# Patient Record
Sex: FEMALE | Race: WHITE | Employment: OTHER | URBAN - METROPOLITAN AREA
[De-identification: names, ages, dates, MRNs, and addresses within clinical notes are randomized per-mention and may not be internally consistent; named-entity substitution may affect disease eponyms.]

---

## 2017-01-13 PROBLEM — F33.1 MODERATE EPISODE OF RECURRENT MAJOR DEPRESSIVE DISORDER (HCC): Status: ACTIVE | Noted: 2017-01-13

## 2017-01-17 ENCOUNTER — PATIENT OUTREACH (OUTPATIENT)
Dept: CASE MANAGEMENT | Age: 72
End: 2017-01-17

## 2017-01-17 NOTE — PROGRESS NOTES
Attempted to reach pt for f/u CCM services, left vm message with RNCM contact information. Will continue attempts to reach pt. This note will not be viewable in [address]

## 2017-01-18 NOTE — PROGRESS NOTES
This note will not be viewable in [address] Care Needs:  Spoke with pt who returned RNCM call. Pt states she saw Dr. Abeba Gay on 1/13/17 with depression, pt reports she is feeling better since having Elavil added. She reports that ibuprofen is helping to control her pain, but that it is impacting her Coumadin levels which she has been compliant with having checked. She states PCP doesnt want her taking narcotics, and pt agrees. She reports she sees [facility] at [facility] who is working with her on diarrhea, it is resolving. Pt is also going to have evaluation of left shoulder by OT at [facility] on Friday of this week. Pt is concerned she has injured the top of her Rt foot and has appt with podiatrist Monday 1/23/17. Next Steps: Scheduled call to f/u in 4wks with OT/podiatry referrals. Pt has my name and number for questions or concerns.

## 2017-02-14 ENCOUNTER — PATIENT OUTREACH (OUTPATIENT)
Dept: CASE MANAGEMENT | Age: 72
End: 2017-02-14

## 2017-02-23 ENCOUNTER — PATIENT OUTREACH (OUTPATIENT)
Dept: CASE MANAGEMENT | Age: 72
End: 2017-02-23

## 2017-03-02 ENCOUNTER — PATIENT OUTREACH (OUTPATIENT)
Dept: CASE MANAGEMENT | Age: 72
End: 2017-03-02

## 2017-03-02 NOTE — PROGRESS NOTES
This note will not be viewable in [address] RNCM attempted to reach pt, left 3rd  message with RNCM contact information. Will continue attempts to reach pt.

## 2017-03-15 ENCOUNTER — PATIENT OUTREACH (OUTPATIENT)
Dept: CASE MANAGEMENT | Age: 72
End: 2017-03-15

## 2017-03-15 NOTE — PROGRESS NOTES
This note will not be viewable in [address] Care Needs: RNCM spoke with pt regarding CCM services. Pt reports she is taking too much ibuprofen up to 600mg every 6hrs, stating she is bruised all over. She has her PT/INR checked tomorrow and says she knows it is going to be elevated. Pt reports she does ok with her walker mobility wise and denies recent falls. She states her pain consumes her so cant walk long distances. Pt saw PCP today, she is happy with changes in medications that were made to increase Trazodone and Effexor. Pt is looking forward to pain mgmt. consult on 3/17/17 with [facility]. She believes there may be more options for treating pain than just giving her narcotics. Pt stated she was on the community bus and needed to hang up. Pt agreed to [facility] f/u in 1wk to discuss her pain mgmt. consult and any other needs. Next Steps: RNCM scheduled call to f/u in 1wk regarding pain mgmt. Pt has RNCM contact information for questions or concerns.

## 2017-03-21 PROBLEM — Z79.899 ENCOUNTER FOR MEDICATION MANAGEMENT: Status: ACTIVE | Noted: 2017-03-21

## 2017-03-21 PROBLEM — R29.3 POSTURAL IMBALANCE: Status: ACTIVE | Noted: 2017-03-21

## 2017-03-21 PROBLEM — G62.9 POLYNEUROPATHY: Status: ACTIVE | Noted: 2017-03-21

## 2017-03-22 ENCOUNTER — PATIENT OUTREACH (OUTPATIENT)
Dept: CASE MANAGEMENT | Age: 72
End: 2017-03-22

## 2017-03-22 NOTE — PROGRESS NOTES
This note will not be viewable in [address] RNCM received call back from pt stating she is doing much better. She received 2 injections yesterday at the pain mgmt clinic. She continues to have pain and states she doesn't think the injections will help. She is continuing to receive OT services for her spasticity. She is also seeing neurologist for possible seizures during previous hospital stay. Pt does not believe she had seizure, and will be having EEG. RNCM discussed upcoming appts. Pt reports she is following up with pain mgmt in 2wks. She reports that she continues to rely heavily on ibuprofen even though it impacts her INR, she is having her INR checked again tomorrow. She is appreciative of Dr. Kayce Trevino and this RNCM as resource. Next Steps: RNCM scheduled call back in 2wks to f/u pain mgmt and INR. Pt has RNCM contact information for questions and concerns.

## 2017-03-22 NOTE — PROGRESS NOTES
This note will not be viewable in [address] RNCM attempted to reach pt for f/u CCM services, left vm with RNCM contact information requesting call back. Will continue attempts to reach pt.

## 2017-03-23 ENCOUNTER — HOSPITAL ENCOUNTER (OUTPATIENT)
Dept: LAB | Age: 72
Discharge: HOME | End: 2017-03-23
Attending: INTERNAL MEDICINE
Payer: MEDICARE

## 2017-03-23 DIAGNOSIS — R06.02 SHORTNESS OF BREATH: ICD-10-CM

## 2017-03-23 LAB
ALBUMIN SERPL BCP-MCNC: 3.7 G/DL (ref 3.2–4.6)
ALBUMIN/GLOB SERPL: 1 {RATIO}
ALP SERPL-CCNC: 82 U/L (ref 50–136)
ALT SERPL-CCNC: 23 U/L (ref 12–65)
ANION GAP BLD CALC-SCNC: 7 MMOL/L
AST SERPL W P-5'-P-CCNC: 22 U/L (ref 15–37)
BASOPHILS # BLD AUTO: 0 K/UL (ref 0–0.2)
BASOPHILS # BLD: 0 % (ref 0–2)
BILIRUB SERPL-MCNC: 0.2 MG/DL (ref 0.2–1.1)
BNP SERPL-MCNC: 148 PG/ML
BUN SERPL-MCNC: 30 MG/DL (ref 8–23)
CALCIUM SERPL-MCNC: 9.5 MG/DL (ref 8.3–10.4)
CHLORIDE SERPL-SCNC: 104 MMOL/L (ref 98–107)
CO2 SERPL-SCNC: 31 MMOL/L (ref 23–32)
CREAT SERPL-MCNC: 1 MG/DL (ref 0.6–1)
DIFFERENTIAL METHOD BLD: ABNORMAL
EOSINOPHIL # BLD: 0 K/UL (ref 0–0.8)
EOSINOPHIL NFR BLD: 0 % (ref 0.5–7.8)
ERYTHROCYTE [DISTWIDTH] IN BLOOD BY AUTOMATED COUNT: 13.7 % (ref 11.9–14.6)
GLOBULIN SER CALC-MCNC: 3.8 G/DL
GLUCOSE SERPL-MCNC: 93 MG/DL (ref 65–100)
HCT VFR BLD AUTO: 40.1 % (ref 35.8–46.3)
HGB BLD-MCNC: 12.7 G/DL (ref 11.7–15.4)
LYMPHOCYTES # BLD AUTO: 15 % (ref 13–44)
LYMPHOCYTES # BLD: 1.3 K/UL (ref 0.5–4.6)
MAGNESIUM SERPL-MCNC: 2.1 MG/DL (ref 1.8–2.4)
MCH RBC QN AUTO: 30.5 PG (ref 26.1–32.9)
MCHC RBC AUTO-ENTMCNC: 31.7 G/DL (ref 31.4–35)
MCV RBC AUTO: 96.2 FL (ref 79.6–97.8)
MONOCYTES # BLD: 1.1 K/UL (ref 0.1–1.3)
MONOCYTES NFR BLD AUTO: 13 % (ref 4–12)
NEUTS SEG # BLD: 6.4 K/UL (ref 1.7–8.2)
NEUTS SEG NFR BLD AUTO: 72 % (ref 43–78)
PLATELET # BLD AUTO: 258 K/UL (ref 150–450)
PMV BLD AUTO: 9.2 FL (ref 10.8–14.1)
POTASSIUM SERPL-SCNC: 3.9 MMOL/L (ref 3.5–5.1)
PROT SERPL-MCNC: 7.5 G/DL (ref 6.3–8.2)
RBC # BLD AUTO: 4.17 M/UL (ref 4.05–5.25)
SODIUM SERPL-SCNC: 142 MMOL/L (ref 136–145)
TSH SERPL DL<=0.005 MIU/L-ACNC: 1.43 UIU/ML (ref 0.36–3.74)
WBC # BLD AUTO: 8.9 K/UL (ref 4.3–11.1)

## 2017-03-23 PROCEDURE — 83880 ASSAY OF NATRIURETIC PEPTIDE: CPT | Performed by: INTERNAL MEDICINE

## 2017-03-23 PROCEDURE — 80053 COMPREHEN METABOLIC PANEL: CPT | Performed by: INTERNAL MEDICINE

## 2017-03-23 PROCEDURE — 36415 COLL VENOUS BLD VENIPUNCTURE: CPT | Performed by: INTERNAL MEDICINE

## 2017-03-23 PROCEDURE — 85025 COMPLETE CBC W/AUTO DIFF WBC: CPT | Performed by: INTERNAL MEDICINE

## 2017-03-23 PROCEDURE — 83735 ASSAY OF MAGNESIUM: CPT | Performed by: INTERNAL MEDICINE

## 2017-03-23 PROCEDURE — 84443 ASSAY THYROID STIM HORMONE: CPT | Performed by: INTERNAL MEDICINE

## 2017-03-24 NOTE — PROGRESS NOTES
Pt.notified of DR. Aguilera's response to labs and she voiced understanding. Lab for bmp/bnp/[facility] [place] [facility]. Prescription for Lasix escribed to [facility].

## 2017-03-24 NOTE — PROGRESS NOTES
Please call patient, have her started lasix 20 per day, check BMP/BNP/Mg in 10 days to  2 weeks. We will see if this helps the SOB and edema.   Thanks, Bita Carpenter

## 2017-04-05 ENCOUNTER — PATIENT OUTREACH (OUTPATIENT)
Dept: CASE MANAGEMENT | Age: 72
End: 2017-04-05

## 2017-04-05 NOTE — PROGRESS NOTES
This note will not be viewable in [address] Care Needs: RNCM spoke with pt regarding [facility] services. Pt reports her f/u EEG was abnormal showing generalized seizure disorder. Pt understands her history of falls may be attributed to the seizures. She has been started on Depakote and reports she is sleepy from it. RNCM discussed falls precautions, pt is aware. She understands she will need to have her blood monitored being on depakote. She will see neurologist 7/2017 when they will do EMG with nerve conduction study. Pt reports her Pain mgmt. physician plans for MRI of back and f/u in 10days. This has not yet been scheduled. Pt diagnosed with UTI and bronchitis, and is on Augmentin and inhaler now. She was given sample of albuterol. She states she will not be able to afford however due to cost of $50.  RNCM researched with [facility] and found that to be cheapest for pt. RNCM will requests additional samples for pt. She realizes she is still taking too much ibuprofen, but says she has INR drawn tomorrow. She has cut her amoxicillin back to once/day due to diarrhea. Pt reports she gets SOB with exertion. She is taking therapy at the apts and her therapist says she is progressing. Her mood and spirits are certainly lifted, as pt verbalizes herself. Next Steps: RNCM scheduled call back in 2wks to f/u pain mgmt., MRI, and INR. Pt has RNCM contact information for questions and concerns.

## 2017-04-06 ENCOUNTER — HOSPITAL ENCOUNTER (OUTPATIENT)
Dept: LAB | Age: 72
Discharge: HOME | End: 2017-04-06
Attending: INTERNAL MEDICINE
Payer: MEDICARE

## 2017-04-06 DIAGNOSIS — I10 ESSENTIAL HYPERTENSION: ICD-10-CM

## 2017-04-06 DIAGNOSIS — I74.9 EMBOLISM AND THROMBOSIS OF UNSPECIFIED ARTERY (HCC): ICD-10-CM

## 2017-04-06 DIAGNOSIS — Z95.2 S/P AVR (AORTIC VALVE REPLACEMENT): Chronic | ICD-10-CM

## 2017-04-06 DIAGNOSIS — Q23.0 AORTIC VALVE STENOSIS, CONGENITAL: ICD-10-CM

## 2017-04-06 LAB
ANION GAP BLD CALC-SCNC: 8 MMOL/L
BNP SERPL-MCNC: 46 PG/ML
BUN SERPL-MCNC: 33 MG/DL (ref 8–23)
CALCIUM SERPL-MCNC: 9.7 MG/DL (ref 8.3–10.4)
CHLORIDE SERPL-SCNC: 101 MMOL/L (ref 98–107)
CO2 SERPL-SCNC: 29 MMOL/L (ref 23–32)
CREAT SERPL-MCNC: 1.1 MG/DL (ref 0.6–1)
GLUCOSE SERPL-MCNC: 98 MG/DL (ref 65–100)
MAGNESIUM SERPL-MCNC: 2 MG/DL (ref 1.8–2.4)
POTASSIUM SERPL-SCNC: 4.7 MMOL/L (ref 3.5–5.1)
SODIUM SERPL-SCNC: 138 MMOL/L (ref 136–145)

## 2017-04-06 PROCEDURE — 83735 ASSAY OF MAGNESIUM: CPT | Performed by: INTERNAL MEDICINE

## 2017-04-06 PROCEDURE — 36415 COLL VENOUS BLD VENIPUNCTURE: CPT | Performed by: INTERNAL MEDICINE

## 2017-04-06 PROCEDURE — 83880 ASSAY OF NATRIURETIC PEPTIDE: CPT | Performed by: INTERNAL MEDICINE

## 2017-04-06 PROCEDURE — 80048 BASIC METABOLIC PNL TOTAL CA: CPT | Performed by: INTERNAL MEDICINE

## 2017-04-06 NOTE — PROGRESS NOTES
Labs look better, how is she feeling on lasix? How much lasix and K is she taking now?   Thanks, Nuzhat Donald

## 2017-04-06 NOTE — PROGRESS NOTES
See telephone encounter lab results from 4/6/17. Pt.was notified of results. Lasix decreased to every other day. Bethany Fischer

## 2017-04-14 PROBLEM — F33.41 RECURRENT MAJOR DEPRESSIVE DISORDER, IN PARTIAL REMISSION (HCC): Status: ACTIVE | Noted: 2017-04-14

## 2017-04-14 PROBLEM — R06.09 DYSPNEA ON EXERTION: Status: ACTIVE | Noted: 2017-04-14

## 2017-04-21 ENCOUNTER — PATIENT OUTREACH (OUTPATIENT)
Dept: CASE MANAGEMENT | Age: 72
End: 2017-04-21

## 2017-04-21 NOTE — PROGRESS NOTES
This note will not be viewable in [address] RNCM attempted to reach pt for f/u of pain mgmt, left vm messaage with RNCM contac information. Will continue attempts to reach pt.

## 2017-04-28 ENCOUNTER — PATIENT OUTREACH (OUTPATIENT)
Dept: CASE MANAGEMENT | Age: 72
End: 2017-04-28

## 2017-04-28 NOTE — PROGRESS NOTES
cThis note will not be viewable in [address] Attempted to reach pt for f/u CCM services, no answer. RNCM left vm message with contact information. Will ontinue attempts to reach pt for f/u.

## 2017-04-28 NOTE — PROGRESS NOTES
This note will not be viewable in [address] RNCM received call back from pt stating she is at [facility] after staying 1wk at [facility]. Pt states she got confused when her neurologist changed her anti-seizure medication, and was found outside by the staff at [facility]. Pt also reports she had taken too much ibuprofen and had severe GI rectal bleed while in hospital.  Pt doesnt have any recollection except for being in ER at [facility] and receiving blood. Pt states she wants to end it all, but she hopes to go back to [facility] after her rehab stay at [facility]. She is aware her PCP recommends [facility], however pt states she doesnt want to go to [facility]. RNCM provided verbal support and prayer for pt while on call. RNCM then called [facility] and spoke with staff Sofía Ramos to report pts comment regarding wanting to end it all. RNCM provided pts PCP contact information to Sofía Ramos. Sofía Ramos took this RNCMs contact information as well.

## 2017-05-05 ENCOUNTER — PATIENT OUTREACH (OUTPATIENT)
Dept: CASE MANAGEMENT | Age: 72
End: 2017-05-05

## 2017-05-05 NOTE — PROGRESS NOTES
This note will not be viewable in [address] Per report from [facility], pt remains at [facility], admitted on 4/25/17 and is medically stable. No dc plan at this time. Will follow for RINKU.

## 2017-05-17 ENCOUNTER — PATIENT OUTREACH (OUTPATIENT)
Dept: CASE MANAGEMENT | Age: 72
End: 2017-05-17

## 2017-05-17 NOTE — PROGRESS NOTES
This note will not be viewable in [address] Care Needs:  RNCM spoke with pt regarding her transition from [facility] to home. Pt states she came home to [address] [place] [address] Ave. living on Monday 5/15/17. Pt has f/u scheduled with NP at PCP office on 5/23/17. Pt has [facility] coming to see her, first visit was yesterday 5/16/17 and [facility] RN will return for INR check on 5/19/17. Pt reports she will also be evaluated by [facility] at [employer]. RNCM attempted to review Coumadin dosing with pt. Pt stated she took 2mg Coumadin last night but is unsure how much she is take tonight. Pt requested to end call so she could call [facility] RN to confirm Coumadin dosing. RNCM agreed to end call. RNCM will f/u with pt.

## 2017-05-18 ENCOUNTER — PATIENT OUTREACH (OUTPATIENT)
Dept: CASE MANAGEMENT | Age: 72
End: 2017-05-18

## 2017-05-18 NOTE — PROGRESS NOTES
This note will not be viewable in [address] RNCM attempted to return call to pt, no answer, RNCM left vm message for pt and will continue attempts at outreach.

## 2017-05-26 ENCOUNTER — PATIENT OUTREACH (OUTPATIENT)
Dept: CASE MANAGEMENT | Age: 72
End: 2017-05-26

## 2017-05-26 NOTE — PROGRESS NOTES
This note will not be viewable in [address] RNCM attempted to reach pt for f/u CCM services. RNCM left vm message with contact information.

## 2017-05-30 ENCOUNTER — PATIENT OUTREACH (OUTPATIENT)
Dept: CASE MANAGEMENT | Age: 72
End: 2017-05-30

## 2017-05-30 NOTE — PROGRESS NOTES
This note will not be viewable in [address] RNCM attempted to reach pt regarding f/u CCM services, no answer. RNCM left vm with contact information. RNCM spoke with rep from [facility] [place] and learned pt is still being followed by this services. RNCM will continue attempts to reach pt.

## 2017-06-05 ENCOUNTER — PATIENT OUTREACH (OUTPATIENT)
Dept: CASE MANAGEMENT | Age: 72
End: 2017-06-05

## 2017-06-05 NOTE — PROGRESS NOTES
This note will not be viewable in [address] Care Needs: RNCM received call from pt stating she thinks she had a seizure during the night. She got up to get some milk and found herself on the floor in the kitchen at 1am. Pt is not aware of how long she was on the floor. She was able to get herself up, thus she didn't seek anyone's assistance via her personal emergency response button around her neck or otherwise. Pt has called Dr. Winter her neurologist and left a message. She has appt with him in 6wks. Pt requests this RNCM also contact Dr. Ronaldo Del Rio office. Pt reports she is missing 2 teeth out of her upper dentures. Pt reports she is going Thursday to have them replaced. RNCM called neurologist as py requested and left message with DARIANA King who is out of the office this afternoon. RNCM also called pt back leaving  notifying of above, and encouraging pt to seek emergency assistance if this occurs again.

## 2017-06-06 ENCOUNTER — PATIENT OUTREACH (OUTPATIENT)
Dept: CASE MANAGEMENT | Age: 72
End: 2017-06-06

## 2017-06-06 NOTE — PROGRESS NOTES
This note will not be viewable in [address] Care Needs: RNCM spoke with  Pt regarding CCM services. Per pt she has not received call back from neurologist office from vms left by pt and RNCM about pt seizure. Pt denies any further seizure activity since previous conversation. Pt reports her INR 1.8 today by [address] RN. They are going to communicate results and next steps for coumadin dosage to pt. Per pt she has a pain mgmt appt on Friday 6/9, PCP appt 6/20. Next Steps: RNCM left 2nd  for Christine WESTBROOK at Dr. Major Floor office at request of pt.

## 2017-06-15 ENCOUNTER — PATIENT OUTREACH (OUTPATIENT)
Dept: CASE MANAGEMENT | Age: 72
End: 2017-06-15

## 2017-06-15 NOTE — Clinical Note
Dr. Debra Santoyo, Please see attached progress note regarding Mao Wan who is also being followed by [address] [place] [address] Road. Thank you, Yady Peterson, RN, BSN, [email or web address] c: [phone] / Lorri Rivera [address] [place] [address], [address] [place], [address] [facility] [email or web address]. [facility]

## 2017-06-15 NOTE — PROGRESS NOTES
This note will not be viewable in [address] Care Needs: Pt reports she started having diarrhea today. Pt denies having fever but is having stomach cramping. She has not taken anything for symptoms but has lomotil and states she will take that. RNCM instructed pt to contact PCP office if continues til tomorrow. Pt stated \"I don't want to see her right now\". Pt seemed cofufused as to the day. RNCM requested permission to [facility] of pt symptoms. Pt agreed and stated she needed to lay back down. RNCM called Ariadna Sprague at [phone] and requested Jumana return call. RNCM also copied PCP office on this note for notification.

## 2017-06-22 ENCOUNTER — PATIENT OUTREACH (OUTPATIENT)
Dept: CASE MANAGEMENT | Age: 72
End: 2017-06-22

## 2017-06-22 NOTE — PROGRESS NOTES
This note will not be viewable in [address] RNCM attempted f/u of CCM services, left vm message with RNCM contact information. Will continue attempts to reach pt.

## 2017-06-26 ENCOUNTER — APPOINTMENT (OUTPATIENT)
Dept: CT IMAGING | Age: 72
DRG: 071 | End: 2017-06-26
Attending: EMERGENCY MEDICINE
Payer: MEDICARE

## 2017-06-26 ENCOUNTER — HOSPITAL ENCOUNTER (INPATIENT)
Age: 72
LOS: 6 days | Discharge: NURSING, REHAB OR LONG-TERM CARE | DRG: 071 | End: 2017-07-03
Attending: EMERGENCY MEDICINE | Admitting: HOSPITALIST
Payer: MEDICARE

## 2017-06-26 DIAGNOSIS — R41.82 ALTERED MENTAL STATUS, UNSPECIFIED ALTERED MENTAL STATUS TYPE: Primary | ICD-10-CM

## 2017-06-26 LAB
ALBUMIN SERPL BCP-MCNC: 3.1 G/DL (ref 3.2–4.6)
ALBUMIN/GLOB SERPL: 0.9 {RATIO} (ref 1.2–3.5)
ALP SERPL-CCNC: 52 U/L (ref 50–136)
ALT SERPL-CCNC: 50 U/L (ref 12–65)
AMPHET UR QL SCN: NEGATIVE
ANION GAP BLD CALC-SCNC: 8 MMOL/L (ref 7–16)
ARTERIAL PATENCY WRIST A: POSITIVE
AST SERPL W P-5'-P-CCNC: 40 U/L (ref 15–37)
BACTERIA URNS QL MICRO: NORMAL /HPF
BARBITURATES UR QL SCN: NEGATIVE
BASE EXCESS BLDA CALC-SCNC: 5.3 MMOL/L (ref 0–3)
BASOPHILS # BLD AUTO: 0 K/UL (ref 0–0.2)
BASOPHILS # BLD: 1 % (ref 0–2)
BDY SITE: ABNORMAL
BENZODIAZ UR QL: NEGATIVE
BILIRUB SERPL-MCNC: 0.3 MG/DL (ref 0.2–1.1)
BUN SERPL-MCNC: 16 MG/DL (ref 8–23)
CALCIUM SERPL-MCNC: 9 MG/DL (ref 8.3–10.4)
CANNABINOIDS UR QL SCN: NEGATIVE
CASTS URNS QL MICRO: 0 /LPF
CHLORIDE SERPL-SCNC: 104 MMOL/L (ref 98–107)
CO2 SERPL-SCNC: 32 MMOL/L (ref 21–32)
COCAINE UR QL SCN: NEGATIVE
COHGB MFR BLD: 0.8 % (ref 0.5–1.5)
CREAT SERPL-MCNC: 0.75 MG/DL (ref 0.6–1)
CRYSTALS URNS QL MICRO: 0 /LPF
DIFFERENTIAL METHOD BLD: ABNORMAL
DO-HGB BLD-MCNC: 8 % (ref 0–5)
EOSINOPHIL # BLD: 0.1 K/UL (ref 0–0.8)
EOSINOPHIL NFR BLD: 2 % (ref 0.5–7.8)
EPI CELLS #/AREA URNS HPF: NORMAL /HPF
ERYTHROCYTE [DISTWIDTH] IN BLOOD BY AUTOMATED COUNT: 13.4 % (ref 11.9–14.6)
FIO2 ON VENT: 21 %
GLOBULIN SER CALC-MCNC: 3.4 G/DL (ref 2.3–3.5)
GLUCOSE SERPL-MCNC: 74 MG/DL (ref 65–100)
HCO3 BLDA-SCNC: 29 MMOL/L (ref 22–26)
HCT VFR BLD AUTO: 41.3 % (ref 35.8–46.3)
HGB BLD-MCNC: 13.9 G/DL (ref 11.7–15.4)
HGB BLDMV-MCNC: 13.8 GM/DL (ref 11.7–15)
IMM GRANULOCYTES # BLD: 0 K/UL (ref 0–0.5)
IMM GRANULOCYTES NFR BLD AUTO: 0.2 % (ref 0–5)
LIPASE SERPL-CCNC: 79 U/L (ref 73–393)
LYMPHOCYTES # BLD AUTO: 28 % (ref 13–44)
LYMPHOCYTES # BLD: 1.6 K/UL (ref 0.5–4.6)
MCH RBC QN AUTO: 32 PG (ref 26.1–32.9)
MCHC RBC AUTO-ENTMCNC: 33.7 G/DL (ref 31.4–35)
MCV RBC AUTO: 95.2 FL (ref 79.6–97.8)
METHADONE UR QL: NEGATIVE
METHGB MFR BLD: 0.2 % (ref 0–1.5)
MONOCYTES # BLD: 0.5 K/UL (ref 0.1–1.3)
MONOCYTES NFR BLD AUTO: 9 % (ref 4–12)
MUCOUS THREADS URNS QL MICRO: 0 /LPF
NEUTS SEG # BLD: 3.4 K/UL (ref 1.7–8.2)
NEUTS SEG NFR BLD AUTO: 60 % (ref 43–78)
OPIATES UR QL: NEGATIVE
OTHER OBSERVATIONS,UCOM: NORMAL
OXYHGB MFR BLDA: 91.5 % (ref 94–97)
PCO2 BLDA: 40 MMHG (ref 35–45)
PCP UR QL: NEGATIVE
PH BLDA: 7.48 [PH] (ref 7.35–7.45)
PLATELET # BLD AUTO: 116 K/UL (ref 150–450)
PMV BLD AUTO: 9.4 FL (ref 10.8–14.1)
PO2 BLDA: 65 MMHG (ref 75–100)
POTASSIUM SERPL-SCNC: 3.4 MMOL/L (ref 3.5–5.1)
PROT SERPL-MCNC: 6.5 G/DL (ref 6.3–8.2)
RBC # BLD AUTO: 4.34 M/UL (ref 4.05–5.25)
RBC #/AREA URNS HPF: NORMAL /HPF
SAO2 % BLD: 92 % (ref 92–98.5)
SODIUM SERPL-SCNC: 144 MMOL/L (ref 136–145)
VENTILATION MODE VENT: ABNORMAL
WBC # BLD AUTO: 5.7 K/UL (ref 4.3–11.1)
WBC URNS QL MICRO: NORMAL /HPF

## 2017-06-26 PROCEDURE — 87641 MR-STAPH DNA AMP PROBE: CPT | Performed by: INTERNAL MEDICINE

## 2017-06-26 PROCEDURE — 80307 DRUG TEST PRSMV CHEM ANLYZR: CPT | Performed by: EMERGENCY MEDICINE

## 2017-06-26 PROCEDURE — 94760 N-INVAS EAR/PLS OXIMETRY 1: CPT

## 2017-06-26 PROCEDURE — 65270000029 HC RM PRIVATE

## 2017-06-26 PROCEDURE — 83690 ASSAY OF LIPASE: CPT | Performed by: EMERGENCY MEDICINE

## 2017-06-26 PROCEDURE — 70450 CT HEAD/BRAIN W/O DYE: CPT

## 2017-06-26 PROCEDURE — 85025 COMPLETE CBC W/AUTO DIFF WBC: CPT | Performed by: EMERGENCY MEDICINE

## 2017-06-26 PROCEDURE — 82803 BLOOD GASES ANY COMBINATION: CPT

## 2017-06-26 PROCEDURE — 96374 THER/PROPH/DIAG INJ IV PUSH: CPT | Performed by: EMERGENCY MEDICINE

## 2017-06-26 PROCEDURE — 99285 EMERGENCY DEPT VISIT HI MDM: CPT | Performed by: EMERGENCY MEDICINE

## 2017-06-26 PROCEDURE — 85610 PROTHROMBIN TIME: CPT | Performed by: EMERGENCY MEDICINE

## 2017-06-26 PROCEDURE — 81015 MICROSCOPIC EXAM OF URINE: CPT | Performed by: EMERGENCY MEDICINE

## 2017-06-26 PROCEDURE — 74011250636 HC RX REV CODE- 250/636: Performed by: EMERGENCY MEDICINE

## 2017-06-26 PROCEDURE — 74011000250 HC RX REV CODE- 250: Performed by: INTERNAL MEDICINE

## 2017-06-26 PROCEDURE — 36600 WITHDRAWAL OF ARTERIAL BLOOD: CPT

## 2017-06-26 PROCEDURE — 72125 CT NECK SPINE W/O DYE: CPT

## 2017-06-26 PROCEDURE — 74011000258 HC RX REV CODE- 258: Performed by: INTERNAL MEDICINE

## 2017-06-26 PROCEDURE — 74011000250 HC RX REV CODE- 250: Performed by: EMERGENCY MEDICINE

## 2017-06-26 PROCEDURE — 74011250636 HC RX REV CODE- 250/636: Performed by: INTERNAL MEDICINE

## 2017-06-26 PROCEDURE — 80053 COMPREHEN METABOLIC PANEL: CPT | Performed by: EMERGENCY MEDICINE

## 2017-06-26 PROCEDURE — 94640 AIRWAY INHALATION TREATMENT: CPT

## 2017-06-26 RX ORDER — CARVEDILOL 6.25 MG/1
6.25 TABLET ORAL 2 TIMES DAILY WITH MEALS
Status: DISCONTINUED | OUTPATIENT
Start: 2017-06-27 | End: 2017-06-29

## 2017-06-26 RX ORDER — SODIUM CHLORIDE 9 MG/ML
100 INJECTION, SOLUTION INTRAVENOUS CONTINUOUS
Status: DISCONTINUED | OUTPATIENT
Start: 2017-06-26 | End: 2017-06-29

## 2017-06-26 RX ORDER — BUDESONIDE 0.5 MG/2ML
500 INHALANT ORAL
Status: DISCONTINUED | OUTPATIENT
Start: 2017-06-26 | End: 2017-07-03 | Stop reason: HOSPADM

## 2017-06-26 RX ORDER — WARFARIN 1 MG/1
4 TABLET ORAL
Status: DISCONTINUED | OUTPATIENT
Start: 2017-06-26 | End: 2017-06-27 | Stop reason: SDUPTHER

## 2017-06-26 RX ORDER — LANOLIN ALCOHOL/MO/W.PET/CERES
1000 CREAM (GRAM) TOPICAL DAILY
Status: DISCONTINUED | OUTPATIENT
Start: 2017-06-27 | End: 2017-06-28

## 2017-06-26 RX ORDER — LANOLIN ALCOHOL/MO/W.PET/CERES
1 CREAM (GRAM) TOPICAL
Status: DISCONTINUED | OUTPATIENT
Start: 2017-06-27 | End: 2017-07-03 | Stop reason: HOSPADM

## 2017-06-26 RX ORDER — LORAZEPAM 2 MG/ML
1 INJECTION INTRAMUSCULAR
Status: COMPLETED | OUTPATIENT
Start: 2017-06-26 | End: 2017-06-26

## 2017-06-26 RX ORDER — PREDNISONE 5 MG/1
5 TABLET ORAL
Status: DISCONTINUED | OUTPATIENT
Start: 2017-06-27 | End: 2017-07-03 | Stop reason: HOSPADM

## 2017-06-26 RX ORDER — ACETAMINOPHEN 500 MG
500 TABLET ORAL
Status: DISCONTINUED | OUTPATIENT
Start: 2017-06-26 | End: 2017-07-03 | Stop reason: HOSPADM

## 2017-06-26 RX ORDER — ALBUTEROL SULFATE 2.5 MG/.5ML
2.5 SOLUTION RESPIRATORY (INHALATION)
Status: DISCONTINUED | OUTPATIENT
Start: 2017-06-26 | End: 2017-06-28

## 2017-06-26 RX ORDER — SODIUM CHLORIDE 0.9 % (FLUSH) 0.9 %
5-10 SYRINGE (ML) INJECTION EVERY 8 HOURS
Status: DISCONTINUED | OUTPATIENT
Start: 2017-06-26 | End: 2017-07-03 | Stop reason: HOSPADM

## 2017-06-26 RX ORDER — SODIUM CHLORIDE 0.9 % (FLUSH) 0.9 %
5-10 SYRINGE (ML) INJECTION AS NEEDED
Status: DISCONTINUED | OUTPATIENT
Start: 2017-06-26 | End: 2017-06-28

## 2017-06-26 RX ORDER — FOLIC ACID 1 MG/1
1 TABLET ORAL DAILY
Status: DISCONTINUED | OUTPATIENT
Start: 2017-06-27 | End: 2017-07-03 | Stop reason: HOSPADM

## 2017-06-26 RX ORDER — LEVOTHYROXINE SODIUM 100 UG/1
100 TABLET ORAL
Status: DISCONTINUED | OUTPATIENT
Start: 2017-06-27 | End: 2017-06-28

## 2017-06-26 RX ADMIN — BUDESONIDE 500 MCG: 0.5 SUSPENSION RESPIRATORY (INHALATION) at 21:25

## 2017-06-26 RX ADMIN — SODIUM CHLORIDE 100 ML/HR: 900 INJECTION, SOLUTION INTRAVENOUS at 20:56

## 2017-06-26 RX ADMIN — LEVETIRACETAM 500 MG: 100 INJECTION, SOLUTION INTRAVENOUS at 21:56

## 2017-06-26 RX ADMIN — Medication 5 ML: at 21:59

## 2017-06-26 RX ADMIN — ALBUTEROL SULFATE 2.5 MG: 2.5 SOLUTION RESPIRATORY (INHALATION) at 21:25

## 2017-06-26 RX ADMIN — LORAZEPAM 1 MG: 2 INJECTION, SOLUTION INTRAMUSCULAR; INTRAVENOUS at 16:28

## 2017-06-26 RX ADMIN — Medication 5 ML: at 15:31

## 2017-06-26 NOTE — ED NOTES
Patient began trying to climb out of bed yelling \"I got to pee! \"     Patient assisted to bed pan with no urine output. Patient continues to state \"I got to pee. \" and trying to climb out of bed. Straight cath performed. 300mL output. Patient assisted into position of comfort.

## 2017-06-26 NOTE — H&P
HOSPITALIST H&P/CONSULT  NAME:  José Coon   Age:  80 y.o.  :   1945   MRN:   [id number]  PCP: Fran Lora MD  Consulting MD:  Treatment Team: Attending Provider: Nicole Avalos DO; Primary Nurse: Maricel Acosta RN  HPI:   Patient is a 80 yof with a hx of cad, afib on coumadin, ckd who presents from [facility] with acute encephalopathy and falls. Pt is unresponsive and unable to aid with hx. CT head on admission is negative for acute abnormalities. Pt has several sedative medications listed on her med rec. ABG is pending.       Complete ROS unable to obtain due to mentation  Past Medical History:   Diagnosis Date    A-fib [facility])     Acquired hypothyroidism     Acute CVA (cerebrovascular accident) ([address] 2014    Acute lacunar infarction ([address]     Anxiety state, unspecified     Aortic valve stenosis, congenital 2016    CAD (coronary artery disease)     Candida Esophagitis 2010    Chronic kidney disease     Chronic pain     Depression     Dermatomyositis ([address]     on chronic steroids    Duodenal ulcer     Dysphagia 2016    Embolism and thrombosis of unspecified artery ([address] 2014    Fibromyalgia     History of anemia 2012    GI blood loss    History of drug overdose 2016    narcotics    History of encephalopathy 2010    History of fracture of hip 03/10/2012    femoral neck    History of mastectomy     bilat    Hypertension     controlled with medication    Immunosuppressed status ([address] 2014    Long term (current) use of anticoagulants     Malignant tumor of colon ([address]     1986-Surgery    Osteoarthritis     Other ill-defined cerebrovascular disease     Pain in joint, pelvic region and thigh     Personal history of fall     Postmenopausal atrophic vaginitis     Primary localized osteoarthrosis, pelvic region and thigh     PUD (peptic ulcer disease)     PVD (peripheral vascular disease) ([address]     Reflux esophagitis     S/P AVR (aortic valve replacement)     [place]    Seasonal allergic rhinitis due to pollen     Sleep disturbance     Symptomatic menopausal or female climacteric states     Thromboembolus [facility]) 2007    right arm , right leg    Unspecified disorder of liver     Urticaria     Mild on back      Past Surgical History:   Procedure Laterality Date    ABDOMEN SURGERY [address]    exploratory tubes & ovaries removed    HX ACL RECONSTRUCTION Left 1979    HX AORTIC VALVE REPLACEMENT  2004    [place] Aortic valve Valve    HX AORTIC VALVE REPLACEMENT      HX CARPAL TUNNEL RELEASE Right 1995    HX CATARACT REMOVAL Right 3/20/2014    [facility]    HX CHOLECYSTECTOMY      HX COLONOSCOPY  9/25/13    Internal Hemorrhoids. Bx reveals Lymphocytic colitis. No further colonscopies will be scheduled due to concominant medical problems.  HX COLONOSCOPY  2010    Internal hemorrhoids. Repeated 2013, no further colonoscopies planned. [place].  HX HYSTERECTOMY  1975    HX MASTECTOMY Bilateral 2000    Precancerous. Norine Favre.  HX OTHER SURGICAL      right brachial  thrombosis    HX TONSIL AND ADENOIDECTOMY      HX TONSILLECTOMY      HX TUMOR REMOVAL  1986    Malignant tumor of colon.  REVISE TOTAL HIP REPLACEMENT Right 1/17/13    TOTAL HIP ARTHROPLASTY Right 12/2012, 3/11/12, 2/2013      Prior to Admission Medications   Prescriptions Last Dose Informant Patient Reported? Taking?   acetaminophen (TYLENOL) 500 mg tablet   Yes No   Sig: Take 500 mg by mouth every six (6) hours as needed for Pain. albuterol (PROAIR HFA) 90 mcg/actuation inhaler   No No   Sig: Take 1 Puff by inhalation every four (4) hours as needed for Wheezing or Shortness of Breath. baclofen (LIORESAL) 10 mg tablet   Yes No   Sig: Take 10 mg by mouth. baclofen (LIORESAL) 10 mg tablet   No No   Sig: Take 1 Tab by mouth three (3) times daily.    budesonide-formoterol (SYMBICORT) 80-4.5 mcg/actuation HFAA inhaler   No No   Sig: Take 2 Puffs by inhalation two (2) times a day. calcium-cholecalciferol, d3, (CALCIUM 600 + D) 600-125 mg-unit tab   Yes No   Sig: Take  by mouth two (2) times a day. carvedilol (COREG) 6.25 mg tablet   No No   Sig: Take 1 Tab by mouth two (2) times daily (with meals). cyanocobalamin 1,000 mcg tablet   Yes No   Sig: Take 1,000 mcg by mouth daily. dicyclomine (BENTYL) 10 mg capsule   Yes No   Sig: Take 10 mg by mouth daily as needed. diphenoxylate-atropine (LOMOTIL) 2.5-0.025 mg per tablet   No No   Sig: Take 1 Tab by mouth four (4) times daily as needed for Diarrhea. Max Daily Amount: 4 Tabs. ferrous sulfate (FEOSOL) 325 mg (65 mg iron) tablet   Yes No   Sig: Take  by mouth Daily (before breakfast). fluconazole (DIFLUCAN) 200 mg tablet   No No   Sig: Take 1 Tab by mouth daily. FDA advises cautious prescribing of oral fluconazole in pregnancy. folic acid (FOLVITE) 1 mg tablet   Yes No   Sig: Take 1 mg by mouth daily. ibuprofen (MOTRIN) 200 mg tablet   Yes No   Sig: Take 400 mg by mouth as needed for Pain.   levETIRAcetam (KEPPRA) 500 mg tablet   Yes No   Sig: Take 500 mg by mouth two (2) times a day. levothyroxine (SYNTHROID) 100 mcg tablet   No No   Sig: Take 1 Tab by mouth Daily (before breakfast). predniSONE (DELTASONE) 5 mg tablet   Yes No   Sig: Take 5 mg by mouth daily. Taking as of 12.24.14   sulfaSALAzine (AZULFIDINE) 500 mg tablet   Yes No   Sig: Take 1,000 mg by mouth three (3) times daily as needed. traZODone (DESYREL) 100 mg tablet   No No   Sig: Take 2 hs prn sleep   venlafaxine-SR (EFFEXOR-XR) 75 mg capsule   No No   Sig: 3 qd   warfarin (COUMADIN) 4 mg tablet   No No   Sig: Take as directed.       Facility-Administered Medications: None     Allergies   Allergen Reactions    Latex Anaphylaxis    Bee Sting [Sting, Bee] Shortness of Breath     anaphylatics    Adhesive Rash     Including Coban wrap      Social History   Substance Use Topics    Smoking status: Former Smoker     Packs/day: 0.25     Years: 10.00     Quit date: 3/23/1976    Smokeless tobacco: Never Used      Comment: in 1970s    Alcohol use No      Family History   Problem Relation Age of Onset    Stroke Mother     Heart Disease Father     Hypertension Father     Heart Disease Sister     Seizures Brother       Objective:     Visit Vitals    /80    Temp 97.5 °F (36.4 °C)    Resp 18    Ht 5' 3\" (1.6 m)    Wt 57.2 kg (126 lb)    SpO2 94%    BMI 22.32 kg/m2      Temp (24hrs), Av.5 °F (36.4 °C), Min:97.5 °F (36.4 °C), Max:97.5 °F (36.4 °C)    Oxygen Therapy  O2 Sat (%): 94 % (17 1702)  Pulse via Oximetry: 68 beats per minute (17 1702)  Physical Exam:  General:    unresponisve   Head:   Normocephalic, without obvious abnormality, atraumatic. Nose:  Nares normal. No drainage or sinus tenderness. Lungs:   Clear to auscultation bilaterally. No Wheezing or Rhonchi. No rales. Heart:   irregular,  no murmur, rub or gallop. Abdomen:   Soft, non-tender. Not distended. Bowel sounds normal.   Extremities: No cyanosis. No edema. No clubbing  Skin:     Texture, turgor normal. No rashes or lesions. Not Jaundiced  Neurologic: Unresponsive. Pupils reactive   Data Review:   Recent Results (from the past 24 hour(s))   CBC WITH AUTOMATED DIFF    Collection Time: 17  3:05 PM   Result Value Ref Range    WBC 5.7 4.3 - 11.1 K/uL    RBC 4.34 4.05 - 5.25 M/uL    HGB 13.9 11.7 - 15.4 g/dL    HCT 41.3 35.8 - 46.3 %    MCV 95.2 79.6 - 97.8 FL    MCH 32.0 26.1 - 32.9 PG    MCHC 33.7 31.4 - 35.0 g/dL    RDW 13.4 11.9 - 14.6 %    PLATELET 160 (L) 245 - 450 K/uL    MPV 9.4 (L) 10.8 - 14.1 FL    DF AUTOMATED      NEUTROPHILS 60 43 - 78 %    LYMPHOCYTES 28 13 - 44 %    MONOCYTES 9 4.0 - 12.0 %    EOSINOPHILS 2 0.5 - 7.8 %    BASOPHILS 1 0.0 - 2.0 %    IMMATURE GRANULOCYTES 0.2 0.0 - 5.0 %    ABS. NEUTROPHILS 3.4 1.7 - 8.2 K/UL    ABS. LYMPHOCYTES 1.6 0.5 - 4.6 K/UL    ABS.  MONOCYTES 0.5 0.1 - 1.3 K/UL    ABS. EOSINOPHILS 0.1 0.0 - 0.8 K/UL    ABS. BASOPHILS 0.0 0.0 - 0.2 K/UL    ABS. IMM. GRANS. 0.0 0.0 - 0.5 K/UL   METABOLIC PANEL, COMPREHENSIVE    Collection Time: 06/26/17  3:05 PM   Result Value Ref Range    Sodium 144 136 - 145 mmol/L    Potassium 3.4 (L) 3.5 - 5.1 mmol/L    Chloride 104 98 - 107 mmol/L    CO2 32 21 - 32 mmol/L    Anion gap 8 7 - 16 mmol/L    Glucose 74 65 - 100 mg/dL    BUN 16 8 - 23 MG/DL    Creatinine 0.75 0.6 - 1.0 MG/DL    GFR est AA >60 >60 ml/min/1.73m2    GFR est non-AA >60 >60 ml/min/1.73m2    Calcium 9.0 8.3 - 10.4 MG/DL    Bilirubin, total 0.3 0.2 - 1.1 MG/DL    ALT (SGPT) 50 12 - 65 U/L    AST (SGOT) 40 (H) 15 - 37 U/L    Alk. phosphatase 52 50 - 136 U/L    Protein, total 6.5 6.3 - 8.2 g/dL    Albumin 3.1 (L) 3.2 - 4.6 g/dL    Globulin 3.4 2.3 - 3.5 g/dL    A-G Ratio 0.9 (L) 1.2 - 3.5     LIPASE    Collection Time: 06/26/17  3:05 PM   Result Value Ref Range    Lipase 79 73 - 393 U/L   DRUG SCREEN, URINE    Collection Time: 06/26/17  4:14 PM   Result Value Ref Range    PCP(PHENCYCLIDINE) NEGATIVE       BENZODIAZEPINE NEGATIVE       COCAINE NEGATIVE       AMPHETAMINE NEGATIVE       METHADONE NEGATIVE       THC (TH-CANNABINOL) NEGATIVE       OPIATES NEGATIVE       BARBITURATES NEGATIVE      URINE MICROSCOPIC    Collection Time: 06/26/17  4:14 PM   Result Value Ref Range    WBC 3-5 0 /hpf    RBC 0-3 0 /hpf    Epithelial cells 0-3 0 /hpf    Bacteria TRACE 0 /hpf    Casts 0 0 /lpf    Crystals, urine 0 0 /LPF    Mucus 0 0 /lpf    Other observations RESULTS VERIFIED MANUALLY       Imaging /Procedures /Studies     Assessment and Plan: Active Hospital Problems    Diagnosis Date Noted    Seizure disorder [facility]) 12/13/2016    Acute encephalopathy 11/09/2016    GERD (gastroesophageal reflux disease) 01/17/2013       PLAN  ·  admit to inpatient  · Check INR on coumadin  · Unclear etiology of encephalopathy. No signs of infection. ? Polypharmacy.   Will monitor as VS currently stable. If persists consider MRI brain  · Hold all sedative meds. · NS @ 100 cc/hr  · Will continue keppra iv with hx of seizures. Does not appear to be active seizures.    · DNR form sent from facility    Code Status:dnr    Anticipated discharge: 3 days    Signed By: Hawa Cronin MD     June 26, 2017

## 2017-06-26 NOTE — ED PROVIDER NOTES
Patient is a 80 y.o. female presenting with altered mental status. The history is provided by the nursing home, the EMS personnel and medical records. The history is limited by the condition of the patient. Altered mental status    This is a new problem. The current episode started 12 to 24 hours ago. The problem has not changed since onset. Associated symptoms include somnolence. Mental status baseline is mild dementia. Risk factors include dementia, trauma and the patient not taking meds correctly. Her past medical history is significant for head trauma.         Past Medical History:   Diagnosis Date    A-fib [facility])     Acquired hypothyroidism     Acute CVA (cerebrovascular accident) ([address] 6/21/2014    Acute lacunar infarction ([address]     Anxiety state, unspecified     Aortic valve stenosis, congenital 2/19/2016    CAD (coronary artery disease)     Candida Esophagitis 2/11/2010    Chronic kidney disease     Chronic pain     Depression     Dermatomyositis ([address]     on chronic steroids    Duodenal ulcer     Dysphagia 2/19/2016    Embolism and thrombosis of unspecified artery ([address] 9/12/2014    Fibromyalgia     History of anemia 03/12/2012    GI blood loss    History of drug overdose 09/2016    narcotics    History of encephalopathy 02/04/2010    History of fracture of hip 03/10/2012    femoral neck    History of mastectomy     bilat    Hypertension     controlled with medication    Immunosuppressed status ([address] 6/20/2014    Long term (current) use of anticoagulants     Malignant tumor of colon ([address]     1986-Surgery    Osteoarthritis     Other ill-defined cerebrovascular disease     Pain in joint, pelvic region and thigh     Personal history of fall     Postmenopausal atrophic vaginitis     Primary localized osteoarthrosis, pelvic region and thigh     PUD (peptic ulcer disease) 2009    PVD (peripheral vascular disease) ([address]     Reflux esophagitis     S/P AVR (aortic valve replacement)     [facility]    Seasonal allergic rhinitis due to pollen     Sleep disturbance     Symptomatic menopausal or female climacteric states     Thromboembolus [facility]) 2007    right arm , right leg    Unspecified disorder of liver     Urticaria     Mild on back       Past Surgical History:   Procedure Laterality Date    ABDOMEN SURGERY [address]    exploratory tubes & ovaries removed    HX ACL RECONSTRUCTION Left 1979    HX AORTIC VALVE REPLACEMENT  2004    [facility] Aortic valve Valve    HX AORTIC VALVE REPLACEMENT      HX CARPAL TUNNEL RELEASE Right 1995    HX CATARACT REMOVAL Right 3/20/2014    [facility]    HX CHOLECYSTECTOMY      HX COLONOSCOPY  9/25/13    Internal Hemorrhoids. Bx reveals Lymphocytic colitis. No further colonscopies will be scheduled due to concominant medical problems.  HX COLONOSCOPY  2010    Internal hemorrhoids. Repeated 2013, no further colonoscopies planned. [facility].  HX HYSTERECTOMY  1975    HX MASTECTOMY Bilateral 2000    Precancerous. [facility].  HX OTHER SURGICAL      right brachial  thrombosis    HX TONSIL AND ADENOIDECTOMY      HX TONSILLECTOMY      HX TUMOR REMOVAL  1986    Malignant tumor of colon.     REVISE TOTAL HIP REPLACEMENT Right 1/17/13    TOTAL HIP ARTHROPLASTY Right 12/2012, 3/11/12, 2/2013         Family History:   Problem Relation Age of Onset    Stroke Mother     Heart Disease Father     Hypertension Father     Heart Disease Sister     Seizures Brother        Social History     Social History    Marital status:      Spouse name: Norm    Number of children: N/A    Years of education: N/A     Occupational History    retired      Social History Main Topics    Smoking status: Former Smoker     Packs/day: 0.25     Years: 10.00     Quit date: 3/23/1976    Smokeless tobacco: Never Used      Comment: in 1970s    Alcohol use No    Drug use: No    Sexual activity: No     Other Topics Concern    Not on file Social History Narrative    Patient lives at [facility]. She denies any in-house stairs. 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 Activity reported as brain games, reading, and visiting friends. Exercise is reported as walking as tolerated. MARCELLUS QUILES 04/23/2014         ALLERGIES: Latex; Bee sting [sting, bee]; and Adhesive    Review of Systems   Unable to perform ROS: Mental status change       Vitals:    06/26/17 1506   BP: 165/79   Resp: 18   Temp: 97.5 °F (36.4 °C)   SpO2: 93%   Weight: 57.2 kg (126 lb)   Height: 5' 3\" (1.6 m)            Physical Exam   Constitutional: She appears well-developed and well-nourished. She appears listless. Non-toxic appearance. She does not have a sickly appearance. No distress. HENT:   Head: Normocephalic and atraumatic. Eyes: Pupils are equal, round, and reactive to light. Cardiovascular: Intact distal pulses. Pulmonary/Chest: Effort normal.   Abdominal: Soft. Neurological: She appears listless. She is disoriented. GCS eye subscore is 4. GCS verbal subscore is 2. GCS motor subscore is 5. Non-verbal.  Eyes rolling around. Skin: Skin is warm and dry. Psychiatric: She has a normal mood and affect. Her behavior is normal.   Nursing note and vitals reviewed. MDM  Number of Diagnoses or Management Options  Altered mental status, unspecified altered mental status type: new and requires workup  Diagnosis management comments: Apparently lives alone in independent living arrangement. Noted today to be altered. Has access to prescription medications and suggested that may have taken some inappropriately but not confirmed. Ally Sanchez at some point and has small injury to chin. Bleeding controlled. Patient unable to offer additional history currently and appears somnolent. No family at bedside currently. Still confused, somnolent.   Spoke with hospitalist and will admit. Still no family at the bedside to compared to baseline although living facility complains of this is different than her baseline. Patient appears to require total assistance at this point which again was reported as abnormal for this patient. Unclear if this is a medication reaction although UDS is unremarkable. No evidence of hemorrhagic injury from falling. Small skin injury to the chin appears to be ameanable with glue or healing by secondary intention.        Amount and/or Complexity of Data Reviewed  Clinical lab tests: ordered and reviewed (Results for orders placed or performed during the hospital encounter of 06/26/17  -CBC WITH AUTOMATED DIFF       Result                                            Value                         Ref Range                       WBC                                               5.7                           4.3 - 11.1 K/uL                 RBC                                               4.34                          4.05 - 5.25 M/uL                HGB                                               13.9                          11.7 - 15.4 g/dL                HCT                                               41.3                          35.8 - 46.3 %                   MCV                                               95.2                          79.6 - 97.8 FL                  MCH                                               32.0                          26.1 - 32.9 PG                  MCHC                                              33.7                          31.4 - 35.0 g/dL                RDW                                               13.4                          11.9 - 14.6 %                   PLATELET                                          116 (L)                       150 - 450 K/uL                  MPV                                               9.4 (L)                       10.8 - 14.1 FL                  DF AUTOMATED                                                     NEUTROPHILS                                       60                            43 - 78 %                       LYMPHOCYTES                                       28                            13 - 44 %                       MONOCYTES                                         9                             4.0 - 12.0 %                    EOSINOPHILS                                       2                             0.5 - 7.8 %                     BASOPHILS                                         1                             0.0 - 2.0 %                     IMMATURE GRANULOCYTES                             0.2                           0.0 - 5.0 %                     ABS. NEUTROPHILS                                  3.4                           1.7 - 8.2 K/UL                  ABS. LYMPHOCYTES                                  1.6                           0.5 - 4.6 K/UL                  ABS. MONOCYTES                                    0.5                           0.1 - 1.3 K/UL                  ABS. EOSINOPHILS                                  0.1                           0.0 - 0.8 K/UL                  ABS. BASOPHILS                                    0.0                           0.0 - 0.2 K/UL                  ABS. IMM.  GRANS.                                  0.0                           0.0 - 0.5 K/UL             -METABOLIC PANEL, COMPREHENSIVE       Result                                            Value                         Ref Range                       Sodium                                            144                           136 - 145 mmol/L                Potassium                                         3.4 (L)                       3.5 - 5.1 mmol/L                Chloride                                          104                           98 - 107 mmol/L                 CO2 32                            21 - 32 mmol/L                  Anion gap                                         8                             7 - 16 mmol/L                   Glucose                                           74                            65 - 100 mg/dL                  BUN                                               16                            8 - 23 MG/DL                    Creatinine                                        0.75                          0.6 - 1.0 MG/DL                 GFR est AA                                        >60                           >60 ml/min/1.73m2               GFR est non-AA                                    >60                           >60 ml/min/1.73m2               Calcium                                           9.0                           8.3 - 10.4 MG/DL                Bilirubin, total                                  0.3                           0.2 - 1.1 MG/DL                 ALT (SGPT)                                        50                            12 - 65 U/L                     AST (SGOT)                                        40 (H)                        15 - 37 U/L                     Alk. phosphatase                                  52                            50 - 136 U/L                    Protein, total                                    6.5                           6.3 - 8.2 g/dL                  Albumin                                           3.1 (L)                       3.2 - 4.6 g/dL                  Globulin                                          3.4                           2.3 - 3.5 g/dL                  A-G Ratio                                         0.9 (L)                       1.2 - 3.5                  -LIPASE       Result                                            Value                         Ref Range                       Lipase                                            79                            73 - 393 U/L               -DRUG SCREEN, URINE       Result                                            Value                         Ref Range                       PCP(PHENCYCLIDINE)                                NEGATIVE                                                      BENZODIAZEPINE                                    NEGATIVE                                                      COCAINE                                           NEGATIVE                                                      AMPHETAMINE                                       NEGATIVE                                                      METHADONE                                         NEGATIVE                                                      THC (TH-CANNABINOL)                               NEGATIVE                                                      OPIATES                                           NEGATIVE                                                      BARBITURATES                                      NEGATIVE                                                 -URINE MICROSCOPIC       Result                                            Value                         Ref Range                       WBC                                               3-5                           0 /hpf                          RBC                                               0-3                           0 /hpf                          Epithelial cells                                  0-3                           0 /hpf                          Bacteria                                          TRACE                         0 /hpf                          Casts                                             0                             0 /lpf                          Crystals, urine                                   0                             0 /LPF                          Mucus                                             0                             0 /lpf Other observations                                RESULTS VERIFIED MANUALLY                                )  Tests in the radiology section of CPT®: ordered and reviewed (Ct Head Wo Cont    Result Date: 6/26/2017  HEAD CT WITHOUT CONTRAST  6/26/2017 HISTORY:   AMS  ; history of head trauma. TECHNIQUE: Noncontrast axial images were obtained through the brain. All CT scans at this facility used dose modulation, interactive reconstruction and/or weight based dosing when appropriate to reduce radiation dose to as low as reasonably achievable. COMPARISON: November 9, 2016 FINDINGS: There is no acute intracranial hemorrhage, significant mass effect or CT evidence of acute large artery territorial infarction. Please note that a hyperacute infarct or small vessel infarct may not be apparent on initial CT imaging. Moderate cerebral volume loss is present. A few areas of low attenuation are present in the supratentorial white matter, suggestive of chronic small vessel ischemic disease. There is no hydrocephalus , intra-axial mass or abnormal extra-axial fluid collection. There are no displaced skull fractures. The left maxillary sinus is partially opacified. IMPRESSION: 1. No acute intracranial findings. 2. Moderate cerebral volume loss. 3. Chronic left maxillary sinus disease. Ct Spine Cerv Wo Cont    Result Date: 6/26/2017  CT CERVICAL SPINE WITHOUT CONTRAST 6/26/2017 HISTORY: Trauma. Altered mental status. TECHNIQUE: Noncontrast axial images were obtained from the craniocervical junction through T3. Multiplanar reformatted images were generated. All CT scans at this facility used dose modulation, iterative reconstruction and/or weight based dosing when appropriate to reduce radiation dose to as low as reasonably achievable. COMPARISON: November 9, 2016 FINDINGS: The cervical vertebrae are normal in height and alignment. The articular facets overlap appropriately.  Multilevel facet degenerative change is present. Degenerative disc disease is present at C3-C4, C4-C5 and C5-C6 with foraminal stenosis at these levels. There is no prevertebral soft tissue swelling. Axial images demonstrate no displaced cervical spine fracture. Included portions of the lung apices are clear. IMPRESSION: 1. No acute findings in the cervical spine. 2. Multilevel facet degenerative change and degenerative disc disease with foraminal stenosis present as described.     )      ED Course       Procedures

## 2017-06-26 NOTE — ED NOTES
Patient from [facility]. Patient has had increasing falls altered mental status. Patient appears very tired. Patient is able to access medications and self administer. Patient has laceration to chin.  BGL 84.  97.5 temp

## 2017-06-26 NOTE — ED NOTES
Patient smiling and nodding to every question. States \"yes\" to each question asked. Patient does appear drowsy.

## 2017-06-27 ENCOUNTER — APPOINTMENT (OUTPATIENT)
Dept: GENERAL RADIOLOGY | Age: 72
DRG: 071 | End: 2017-06-27
Attending: INTERNAL MEDICINE
Payer: MEDICARE

## 2017-06-27 LAB
ANION GAP BLD CALC-SCNC: 8 MMOL/L (ref 7–16)
BACTERIA SPEC CULT: NORMAL
BUN SERPL-MCNC: 12 MG/DL (ref 8–23)
CALCIUM SERPL-MCNC: 8 MG/DL (ref 8.3–10.4)
CHLORIDE SERPL-SCNC: 105 MMOL/L (ref 98–107)
CO2 SERPL-SCNC: 30 MMOL/L (ref 21–32)
CREAT SERPL-MCNC: 0.55 MG/DL (ref 0.6–1)
ERYTHROCYTE [DISTWIDTH] IN BLOOD BY AUTOMATED COUNT: 13.2 % (ref 11.9–14.6)
GLUCOSE SERPL-MCNC: 76 MG/DL (ref 65–100)
HCT VFR BLD AUTO: 40.3 % (ref 35.8–46.3)
HGB BLD-MCNC: 13.1 G/DL (ref 11.7–15.4)
INR PPP: 2.2 (ref 0.9–1.2)
MCH RBC QN AUTO: 31.3 PG (ref 26.1–32.9)
MCHC RBC AUTO-ENTMCNC: 32.5 G/DL (ref 31.4–35)
MCV RBC AUTO: 96.2 FL (ref 79.6–97.8)
PLATELET # BLD AUTO: 109 K/UL (ref 150–450)
PMV BLD AUTO: 10.1 FL (ref 10.8–14.1)
POTASSIUM SERPL-SCNC: 3.2 MMOL/L (ref 3.5–5.1)
PROTHROMBIN TIME: 23.8 SEC (ref 9.6–12)
RBC # BLD AUTO: 4.19 M/UL (ref 4.05–5.25)
SERVICE CMNT-IMP: NORMAL
SODIUM SERPL-SCNC: 143 MMOL/L (ref 136–145)
WBC # BLD AUTO: 5.8 K/UL (ref 4.3–11.1)

## 2017-06-27 PROCEDURE — 73070 X-RAY EXAM OF ELBOW: CPT

## 2017-06-27 PROCEDURE — 74011000258 HC RX REV CODE- 258: Performed by: INTERNAL MEDICINE

## 2017-06-27 PROCEDURE — 74011636637 HC RX REV CODE- 636/637: Performed by: INTERNAL MEDICINE

## 2017-06-27 PROCEDURE — 94640 AIRWAY INHALATION TREATMENT: CPT

## 2017-06-27 PROCEDURE — 99218 HC RM OBSERVATION: CPT

## 2017-06-27 PROCEDURE — 80048 BASIC METABOLIC PNL TOTAL CA: CPT | Performed by: INTERNAL MEDICINE

## 2017-06-27 PROCEDURE — 74011250636 HC RX REV CODE- 250/636: Performed by: INTERNAL MEDICINE

## 2017-06-27 PROCEDURE — 74011000250 HC RX REV CODE- 250: Performed by: INTERNAL MEDICINE

## 2017-06-27 PROCEDURE — 94760 N-INVAS EAR/PLS OXIMETRY 1: CPT

## 2017-06-27 PROCEDURE — 36415 COLL VENOUS BLD VENIPUNCTURE: CPT | Performed by: INTERNAL MEDICINE

## 2017-06-27 PROCEDURE — 97162 PT EVAL MOD COMPLEX 30 MIN: CPT

## 2017-06-27 PROCEDURE — 74011250637 HC RX REV CODE- 250/637: Performed by: INTERNAL MEDICINE

## 2017-06-27 PROCEDURE — 85027 COMPLETE CBC AUTOMATED: CPT | Performed by: INTERNAL MEDICINE

## 2017-06-27 RX ORDER — WARFARIN 1 MG/1
2 TABLET ORAL
Status: DISCONTINUED | OUTPATIENT
Start: 2017-06-27 | End: 2017-07-01

## 2017-06-27 RX ORDER — WARFARIN 1 MG/1
4 TABLET ORAL
Status: DISCONTINUED | OUTPATIENT
Start: 2017-06-28 | End: 2017-07-01

## 2017-06-27 RX ADMIN — ALBUTEROL SULFATE 2.5 MG: 2.5 SOLUTION RESPIRATORY (INHALATION) at 13:59

## 2017-06-27 RX ADMIN — BUDESONIDE 500 MCG: 0.5 SUSPENSION RESPIRATORY (INHALATION) at 08:12

## 2017-06-27 RX ADMIN — CARVEDILOL 6.25 MG: 6.25 TABLET, FILM COATED ORAL at 08:24

## 2017-06-27 RX ADMIN — PREDNISONE 5 MG: 5 TABLET ORAL at 08:24

## 2017-06-27 RX ADMIN — Medication 5 ML: at 21:01

## 2017-06-27 RX ADMIN — BUDESONIDE 500 MCG: 0.5 SUSPENSION RESPIRATORY (INHALATION) at 20:02

## 2017-06-27 RX ADMIN — CYANOCOBALAMIN TAB 1000 MCG 1000 MCG: 1000 TAB at 08:24

## 2017-06-27 RX ADMIN — SODIUM CHLORIDE 100 ML/HR: 900 INJECTION, SOLUTION INTRAVENOUS at 23:24

## 2017-06-27 RX ADMIN — Medication 5 ML: at 05:52

## 2017-06-27 RX ADMIN — LEVETIRACETAM 500 MG: 100 INJECTION, SOLUTION INTRAVENOUS at 08:24

## 2017-06-27 RX ADMIN — FOLIC ACID 1 MG: 1 TABLET ORAL at 08:24

## 2017-06-27 RX ADMIN — LEVOTHYROXINE SODIUM 100 MCG: 100 TABLET ORAL at 05:52

## 2017-06-27 RX ADMIN — FERROUS SULFATE TAB 325 MG (65 MG ELEMENTAL FE) 325 MG: 325 (65 FE) TAB at 05:52

## 2017-06-27 RX ADMIN — CARVEDILOL 6.25 MG: 6.25 TABLET, FILM COATED ORAL at 16:09

## 2017-06-27 RX ADMIN — WARFARIN SODIUM 2 MG: 1 TABLET ORAL at 16:09

## 2017-06-27 RX ADMIN — ACETAMINOPHEN 500 MG: 500 TABLET ORAL at 04:21

## 2017-06-27 RX ADMIN — ALBUTEROL SULFATE 2.5 MG: 2.5 SOLUTION RESPIRATORY (INHALATION) at 08:13

## 2017-06-27 RX ADMIN — ALBUTEROL SULFATE 2.5 MG: 2.5 SOLUTION RESPIRATORY (INHALATION) at 01:53

## 2017-06-27 RX ADMIN — LEVETIRACETAM 500 MG: 100 INJECTION, SOLUTION INTRAVENOUS at 21:00

## 2017-06-27 RX ADMIN — Medication 5 ML: at 14:00

## 2017-06-27 RX ADMIN — ALBUTEROL SULFATE 2.5 MG: 2.5 SOLUTION RESPIRATORY (INHALATION) at 20:02

## 2017-06-27 NOTE — PROGRESS NOTES
Patient stable with no complaints at this time. Patient with several BMs today but not loose. Patient still confused. This RN spoke with son Ck Alford and son stated \"This is not the first time she has done this and she just got out of [facility]\". Son suggested to call SW tomorrow. Call light within reach and patient instructed to call if assistance is needed.   Report to be given to oncoming RN 7p-7a

## 2017-06-27 NOTE — PROGRESS NOTES
Alert confused. IVF infusing without difficulty. Self release lap belt in place. Dina alarm in place for safety.

## 2017-06-27 NOTE — PROGRESS NOTES
TRANSFER - IN REPORT:    [facility]  being received from ER for routine progression of care      Report consisted of patients Situation, Background, Assessment and   Recommendations(SBAR). Information from the following report(s) ED Summary was reviewed with the receiving nurse. Opportunity for questions and clarification was provided. Assessment completed upon patients arrival to unit and care assumed.

## 2017-06-27 NOTE — PROGRESS NOTES
Patient awake confused. Repeats \" I Hurt\" over and over. Unable to state where pain is or level of pain. Tylenol 500 mg crushed in applesauce given po.

## 2017-06-27 NOTE — PROGRESS NOTES
Problem: Interdisciplinary Rounds  Goal: Interdisciplinary Rounds  Outcome: Progressing Towards Goal  Interdisciplinary team rounds were held 6/27/2017 with the following team members:Care Management, Nursing and Clinical Coordinator and the patient. Plan of care discussed. See clinical pathway and/or care plan for interventions and desired outcomes.

## 2017-06-27 NOTE — PROGRESS NOTES
Problem: Mobility Impaired (Adult and Pediatric)  Goal: *Acute Goals and Plan of Care (Insert Text)  STG:  (1.)Ms. Lashonda Nielsen will move from supine to sit and sit to supine , scoot up and down and roll side to side with INDEPENDENT within 7 day(s). (2.)Ms. Lashonda Nielsen will transfer from bed to chair and chair to bed with Supervision using the least restrictive device within 7 day(s). (3.)Ms. Lashonda Nielsen will ambulate with SUPERVISION for 150 feet with the least restrictive device within 7 day(s). (4.)Ms Lashonda Nielsen will follow commands for BLE AROM exercises and gait/balance activities 75% time within 7 days       PHYSICAL THERAPY: INITIAL ASSESSMENT, TREATMENT DAY: DAY OF ASSESSMENT, PM 6/27/2017  INPATIENT: Hospital Day: 2  [place] / Product Type: Medicare /      NAME/AGE/GENDER: Trish Florian is a 80 y.o. female     PRIMARY DIAGNOSIS: Acute encephalopathy  Acute encephalopathy Acute encephalopathy Acute encephalopathy        ICD-10: Treatment Diagnosis:       · Generalized Muscle Weakness (M62.81)  · Difficulty in walking, Not elsewhere classified (R26.2)  · Other abnormalities of gait and mobility (R26.89)  · History of falling (Z91.81)   Precaution/Allergies:  Latex; Bee sting [sting, bee]; and Adhesive       ASSESSMENT:      Ms. Lashonda Nielsen is a 80year old WF with an admitting diagnosis of acute encephalopathy. She presents today supine in bed restless but responding to her name and occasionally to questions. She is impulsive with all her movements. She has a safety belt on the bed along with a bed alarm to help ensure safety. It is reported that she lives in an [address] [place] [address] but there is no other information available or anyone to assist providing information at this time regarding her prior level of function at the AL. She remains confused and it is difficult for her to follow commands consistently. She is active with her bed mobility at an independent level but unsafe.   Her sitting balance is supervision with manual and verbal cues to stay seated on the edge of the bed. Sit to stand is minimal assist using manual support in front of the patient for standing. She can ambulate but is too confused to follow commands for safety with ambulation or with any type of assistive device. She stated she wants to go home. She returned to supine with minimal assistance and the safety belt was secured and the bed alarm turned back on. She would benefit from continued skilled PT to achieve a more safe, functional level as her cognitive issue resolve. This section established at most recent assessment   PROBLEM LIST (Impairments causing functional limitations):  1. Decreased Strength  2. Decreased ADL/Functional Activities  3. Decreased Transfer Abilities  4. Decreased Ambulation Ability/Technique  5. Decreased Balance  6. Decreased Activity Tolerance  7. Increased Fatigue  8. Decreased Cognition    INTERVENTIONS PLANNED: (Benefits and precautions of physical therapy have been discussed with the patient.)  1. Bed Mobility  2. Gait Training  3. Therapeutic Activites  4. Therapeutic Exercise/Strengthening  5. Transfer Training      TREATMENT PLAN: Frequency/Duration: 3 times a week for duration of hospital stay  Rehabilitation Potential For Stated Goals: GOOD      RECOMMENDED REHABILITATION/EQUIPMENT: (at time of discharge pending progress): Continue Skilled Therapy and unknown at this time what she will need at discharge. HISTORY:   History of Present Injury/Illness (Reason for Referral):  Patient is a 80 yof with a hx of cad, afib on coumadin, ckd who presents from half-way with acute encephalopathy and falls. Pt is unresponsive and unable to aid with hx. CT head on admission is negative for acute abnormalities. Pt has several sedative medications listed on her med rec. ABG is pending.        Past Medical History/Comorbidities:   Ms. Louise Craven  has a past medical history of A-fib ([facility] [address] Acquired hypothyroidism; Acute CVA (cerebrovascular accident) ([facility] [address] (6/21/2014); Acute lacunar infarction [facility]); Anxiety state, unspecified; Aortic valve stenosis, congenital (2/19/2016); CAD (coronary artery disease); Candida Esophagitis (2/11/2010); Chronic kidney disease; Chronic pain; Depression; Dermatomyositis ([facility] [address] Duodenal ulcer; Dysphagia (2/19/2016); Embolism and thrombosis of unspecified artery ([facility] [address] (9/12/2014); Fibromyalgia; History of anemia (03/12/2012); History of drug overdose (09/2016); History of encephalopathy (02/04/2010); History of fracture of hip (03/10/2012); History of mastectomy; Hypertension; Immunosuppressed status ([facility] [address] (6/20/2014); Long term (current) use of anticoagulants; Malignant tumor of colon ([facility] [address] Osteoarthritis; Other ill-defined cerebrovascular disease; Pain in joint, pelvic region and thigh; Personal history of fall; Postmenopausal atrophic vaginitis; Primary localized osteoarthrosis, pelvic region and thigh; PUD (peptic ulcer disease) (2009); PVD (peripheral vascular disease) ([facility] [address] Reflux esophagitis; S/P AVR (aortic valve replacement); Seasonal allergic rhinitis due to pollen; Sleep disturbance; Symptomatic menopausal or female climacteric states; Thromboembolus ([facility] [address] (2007); Unspecified disorder of liver; and Urticaria. Ms. Rebeka Hoffman  has a past surgical history that includes hysterectomy (1975); tonsillectomy; other surgical; carpal tunnel release (Right, 1995); abdomen surgery proc unlisted (1990); colonoscopy (9/25/13); colonoscopy (2010); cataract removal (Right, 3/20/2014); tumor removal (1986); mastectomy (Bilateral, 2000); tonsil and adenoidectomy; aortic valve replacement (2004); aortic valve replacement; cholecystectomy; total hip arthroplasty (Right, 12/2012, 3/11/12, 2/2013); revise total hip replacement (Right, 1/17/13); and acl reconstruction (Left, 1979).   Social History/Living Environment:      Prior Level of Function/Work/Activity:  Unknown with no family present to provide update of prior level of function. Number of Personal Factors/Comorbidities that affect the Plan of Care: 3+: HIGH COMPLEXITY   EXAMINATION:   Most Recent Physical Functioning:   Gross Assessment:  AROM: Within functional limits  PROM: Within functional limits  Strength: Generally decreased, functional  Coordination: Generally decreased, functional  Tone: Normal  Sensation: Intact               Posture:  Posture (WDL): Within defined limits  Balance:  Sitting: Impaired  Sitting - Static: Fair (occasional)  Sitting - Dynamic: Fair (occasional)  Standing: Impaired  Standing - Static: Fair;Constant support  Standing - Dynamic : Fair Bed Mobility:  Rolling: Modified independent  Supine to Sit: Contact guard assistance  Sit to Supine: Contact guard assistance  Scooting: Contact guard assistance  Wheelchair Mobility:     Transfers:  Sit to Stand: Contact guard assistance  Stand to Sit: Contact guard assistance  Gait:     Base of Support: Narrowed  Speed/Julia: Fluctuations  Step Length: Left shortened;Right shortened  Gait Abnormalities: Trunk sway increased;Lurching;Decreased step clearance  Distance (ft): 8 Feet (ft) (steps at the bedside forward/back)  Assistive Device: Other (comment) (Manual assist of BUEs)  Ambulation - Level of Assistance: Minimal assistance  Interventions: Manual cues; Verbal cues; Safety awareness training       Body Structures Involved:  1. Metabolic Body Functions Affected:  1. Movement Related  2. Metobolic/Endocrine Activities and Participation Affected:  1. Learning and Applying Knowledge  2. General Tasks and Demands  3. Communication  4.  Self Care   Number of elements that affect the Plan of Care: 4+: HIGH COMPLEXITY   CLINICAL PRESENTATION:   Presentation: Evolving clinical presentation with changing clinical characteristics: MODERATE COMPLEXITY   CLINICAL DECISION MAKIN [facility] Form  How much difficulty does the patient currently have. .. Unable A Lot A Little None   1. Turning over in bed (including adjusting bedclothes, sheets and blankets)? [ ] 1   [ ] 2   [ ] 3   [X] 4   2. Sitting down on and standing up from a chair with arms ( e.g., wheelchair, bedside commode, etc.)   [ ] 1   [ ] 2   [X] 3   [ ] 4   3. Moving from lying on back to sitting on the side of the bed? [ ] 1   [ ] 2   [X] 3   [ ] 4   How much help from another person does the patient currently need. .. Total A Lot A Little None   4. Moving to and from a bed to a chair (including a wheelchair)? [ ] 1   [ ] 2   [X] 3   [ ] 4   5. Need to walk in hospital room? [ ] 1   [X] 2   [ ] 3   [ ] 4   6. Climbing 3-5 steps with a railing? [X] 1   [ ] 2   [ ] 3   [ ] 4   © 2007, Trustees of [address] [place] [address], under license to "[employer]. All rights reserved    Score:  Initial: 16 Most Recent: X (Date: -- )     Interpretation of Tool:  Represents activities that are increasingly more difficult (i.e. Bed mobility, Transfers, Gait). Score 24 23 22-20 19-15 14-10 9-7 6       Modifier CH CI CJ CK CL CM CN         · Mobility - Walking and Moving Around:               - CURRENT STATUS:    CK - 40%-59% impaired, limited or restricted               - GOAL STATUS:           CI - 1%-19% impaired, limited or restricted               - D/C STATUS:                       ---------------To be determined---------------  [place] / [place] / Product Type: Medicare /       Medical Necessity:     · Patient is expected to demonstrate progress in strength, range of motion and functional technique to decrease assistance required with transfers and gait, increase independence with basic mobility skills and improve safety during sitting and standing activities.   Reason for Services/Other Comments:  · Patient continues to require skilled intervention due to medical complications and patient unable to attend/participate in therapy as expected secondary to cognitive status. Use of outcome tool(s) and clinical judgement create a POC that gives a: Questionable prediction of patient's progress: MODERATE COMPLEXITY                 TREATMENT:   (In addition to Assessment/Re-Assessment sessions the following treatments were rendered)   Pre-treatment Symptoms/Complaints:  Patient had no complaints of pain but was restless and impulsive with all her activities. A lap belt is being used for safety in the bed along with the bed alarm. Pain: Initial: 0/10     Post Session:  0/10      Assessment/Reassessment only, no treatment provided today     Braces/Orthotics/Lines/Etc:   · IV  · O2 Device: Room air  Treatment/Session Assessment:    · Response to Treatment:  Patient would respond periodically with an appropriate response to questions asked. She stated she wants to go home. · Interdisciplinary Collaboration:  · Physical Therapist  · Registered Nurse  · After treatment position/precautions:  · Supine in bed  · Bed alarm/tab alert on  · Bed/Chair-wheels locked  · Bed in low position  · Call light within reach  · RN notified  · safety belt secured. · Compliance with Program/Exercises: Will assess as treatment progresses. · Recommendations/Intent for next treatment session: \"Next visit will focus on advancements to more challenging activities and reduction in assistance provided\".   Total Treatment Duration:  PT Patient Time In/Time Out  Time In: 1330  Time Out: [address]

## 2017-06-27 NOTE — PROGRESS NOTES
Patient in bed resting with no complaints at this time but continues to climb out of bed; lab belt in place and posy alarm on. Patient is confused but no distress noted. IV intact and patent with no s/s of infection noted. Respirations even and unlabored with heart rate regular. Patient has bruising on right side of abd some on bilateral leg and arms along with several spots on her face. Patient unable to ambulate independently without assistance; needs x1 r/t weakness and hx of falls. Bed in low locked position with call light within reach. Patient instructed to call if assistance is needed. Will continue to monitor.

## 2017-06-27 NOTE — PROGRESS NOTES
Patient resting in bed with no complaints at this time. Patient still confused and lapbelt applied for safety. Patient using BSC with x1 assist.  Patient is more coherent this afternoon compared to this AM.  Patient awaiting xray of elbow. Call light within reach and patient instructed to call if assistance is needed. Will continue to monitor.

## 2017-06-27 NOTE — PROGRESS NOTES
Patient confused restless. Does not follow directions. Bruising to right side of face becoming more pronounced. Self release lap belt in place. Posey alarm in use for safety.  IVF infusing well

## 2017-06-27 NOTE — PROGRESS NOTES
Hospitalist Progress Note    2017  Admit Date: 2017  3:05 PM   NAME: José Hale   :  1945   MRN:  [id number]   Attending: Ny Lam MD  PCP:  Amanda Velásquez MD    SUBJECTIVE:   Patient presented from long term with confusion and falls. Unresponsive on admission to ER with negative workup. Remains lethargic but more alert than admission. confused      Review of Systems unable to obtain due to mentation  PHYSICAL EXAM     Visit Vitals    /83 (BP 1 Location: Right arm, BP Patient Position: Head of bed elevated (Comment degrees))    Pulse 68    Temp 98.5 °F (36.9 °C)    Resp 16    Ht 5' 3\" (1.6 m)    Wt 57.2 kg (126 lb)    SpO2 97%    BMI 22.32 kg/m2      Temp (24hrs), Av.8 °F (36.6 °C), Min:97.5 °F (36.4 °C), Max:98.5 °F (36.9 °C)    Oxygen Therapy  O2 Sat (%): 97 % (17)  Pulse via Oximetry: 74 beats per minute (17)  O2 Device: Room air (17)  No intake or output data in the 24 hours ending 17   General: Awakens to name. confused  Lungs:  Coarse at bases  Cardio:  Irregular,  No murmur, rub, or gallop  Abdomen: Soft, Non distended, Non tender, Positive bowel sounds  Extremities: No cyanosis, clubbing or edema  Neurologic:  Moves extremities    ASSESSMENT      Active Hospital Problems    Diagnosis Date Noted    Seizure disorder ([facility] [address] 2016    Acute encephalopathy 2016    GERD (gastroesophageal reflux disease) 2013     Plan:  · Suspect polypharmacy as mentation slowly improving. VS remain stable. Continue to monitor  · IV keppra. Resume PO as mentation improves  · Will ask PT to eval given falls.       DVT Prophylaxis: coumadin    Signed By: Ny Lam MD     2017

## 2017-06-27 NOTE — PROGRESS NOTES
Warfarin dosing per pharmacist    Topher Coon is a 80 y.o. female. Height: 5' 3\" (160 cm)    Weight: 57.2 kg (126 lb)    Indication:  A. Fib    Goal INR:  2-3    Home dose:  Warfarin 2 mg every evening on Tuesday & Saturday , Warfarin 4 mg every evening on Sunday, Monday, Wednesday, Thursday and Friday. Risk factors or significant drug interactions:  levothyroxine    Other anticoagulants:  none    Daily Monitoring  Date  INR     Warfarin dose HGB              Notes  6/27  2.2 (6/26) 2 mg  13.1      Pharmacy consulted to manage warfarin on 6/27/17 by Dr. Anitha Prince    80 YOF with a history of A. Fib on warfarin as an outpatient. INR is at goal.  Continue home regimen. Daily INR monitor.     Thank you,  Jeanna Cannon, PharmD, [facility]  Clinical Pharmacist  [phone]

## 2017-06-27 NOTE — PROGRESS NOTES
Patient has redness, heat, and edema noted to right elbow with redness noted to left elbow. Patient states right elbow hurts but not the left. MD notified.

## 2017-06-27 NOTE — PROGRESS NOTES
Received via stretcher to 820. Patient does not answer questions. No family present. Unable to admit via data base. Skin assessment done with Cynthia Honeycutt RN. Bruising with small abrasion to chin. Bruising to ROQ abdomen. Multiple bruising to bilateral lower extremities. No  or red areas noted.

## 2017-06-27 NOTE — PROGRESS NOTES
Alert, confused. Routine medications. Taken whole in applesauce without difficulty or cough. Head of bed elevated. Self release lap belt in place. Posey alarm on. Will monitor.

## 2017-06-28 ENCOUNTER — APPOINTMENT (OUTPATIENT)
Dept: CT IMAGING | Age: 72
DRG: 071 | End: 2017-06-28
Attending: HOSPITALIST
Payer: MEDICARE

## 2017-06-28 PROBLEM — E83.42 HYPOMAGNESEMIA: Status: ACTIVE | Noted: 2017-06-28

## 2017-06-28 PROBLEM — G93.41 ACUTE METABOLIC ENCEPHALOPATHY: Status: ACTIVE | Noted: 2017-06-28

## 2017-06-28 PROBLEM — E87.6 HYPOKALEMIA: Status: ACTIVE | Noted: 2017-06-28

## 2017-06-28 LAB
ANION GAP BLD CALC-SCNC: 10 MMOL/L (ref 7–16)
BUN SERPL-MCNC: 8 MG/DL (ref 8–23)
CALCIUM SERPL-MCNC: 8 MG/DL (ref 8.3–10.4)
CHLORIDE SERPL-SCNC: 106 MMOL/L (ref 98–107)
CO2 SERPL-SCNC: 26 MMOL/L (ref 21–32)
CREAT SERPL-MCNC: 0.43 MG/DL (ref 0.6–1)
ERYTHROCYTE [DISTWIDTH] IN BLOOD BY AUTOMATED COUNT: 13.1 % (ref 11.9–14.6)
GLUCOSE SERPL-MCNC: 84 MG/DL (ref 65–100)
HCT VFR BLD AUTO: 38.3 % (ref 35.8–46.3)
HGB BLD-MCNC: 13 G/DL (ref 11.7–15.4)
INR PPP: 1.5 (ref 0.9–1.2)
MAGNESIUM SERPL-MCNC: 1.6 MG/DL (ref 1.8–2.4)
MCH RBC QN AUTO: 31.7 PG (ref 26.1–32.9)
MCHC RBC AUTO-ENTMCNC: 33.9 G/DL (ref 31.4–35)
MCV RBC AUTO: 93.4 FL (ref 79.6–97.8)
PLATELET # BLD AUTO: 100 K/UL (ref 150–450)
PMV BLD AUTO: 9.1 FL (ref 10.8–14.1)
POTASSIUM SERPL-SCNC: 2.7 MMOL/L (ref 3.5–5.1)
PROTHROMBIN TIME: 16.1 SEC (ref 9.6–12)
RBC # BLD AUTO: 4.1 M/UL (ref 4.05–5.25)
RPR SER QL: NONREACTIVE
SODIUM SERPL-SCNC: 142 MMOL/L (ref 136–145)
T3 SERPL-MCNC: 0.99 NG/ML (ref 0.6–1.81)
T4 FREE SERPL-MCNC: 1.2 NG/DL (ref 0.78–1.46)
TSH SERPL DL<=0.005 MIU/L-ACNC: 10.8 UIU/ML (ref 0.36–3.74)
VALPROATE SERPL-MCNC: 15 UG/ML (ref 50–100)
VIT B12 SERPL-MCNC: 2510 PG/ML (ref 193–986)
WBC # BLD AUTO: 7.7 K/UL (ref 4.3–11.1)

## 2017-06-28 PROCEDURE — 74011250637 HC RX REV CODE- 250/637: Performed by: HOSPITALIST

## 2017-06-28 PROCEDURE — A9270 NON-COVERED ITEM OR SERVICE: HCPCS | Performed by: INTERNAL MEDICINE

## 2017-06-28 PROCEDURE — 87088 URINE BACTERIA CULTURE: CPT | Performed by: HOSPITALIST

## 2017-06-28 PROCEDURE — 86592 SYPHILIS TEST NON-TREP QUAL: CPT | Performed by: INTERNAL MEDICINE

## 2017-06-28 PROCEDURE — 85027 COMPLETE CBC AUTOMATED: CPT | Performed by: INTERNAL MEDICINE

## 2017-06-28 PROCEDURE — 82607 VITAMIN B-12: CPT | Performed by: INTERNAL MEDICINE

## 2017-06-28 PROCEDURE — 84480 ASSAY TRIIODOTHYRONINE (T3): CPT | Performed by: HOSPITALIST

## 2017-06-28 PROCEDURE — 36415 COLL VENOUS BLD VENIPUNCTURE: CPT | Performed by: INTERNAL MEDICINE

## 2017-06-28 PROCEDURE — 74011250636 HC RX REV CODE- 250/636: Performed by: INTERNAL MEDICINE

## 2017-06-28 PROCEDURE — 83735 ASSAY OF MAGNESIUM: CPT | Performed by: INTERNAL MEDICINE

## 2017-06-28 PROCEDURE — 95816 EEG AWAKE AND DROWSY: CPT | Performed by: HOSPITALIST

## 2017-06-28 PROCEDURE — 65660000000 HC RM CCU STEPDOWN

## 2017-06-28 PROCEDURE — 86580 TB INTRADERMAL TEST: CPT | Performed by: HOSPITALIST

## 2017-06-28 PROCEDURE — 65270000029 HC RM PRIVATE

## 2017-06-28 PROCEDURE — 80048 BASIC METABOLIC PNL TOTAL CA: CPT | Performed by: INTERNAL MEDICINE

## 2017-06-28 PROCEDURE — 74011250637 HC RX REV CODE- 250/637: Performed by: INTERNAL MEDICINE

## 2017-06-28 PROCEDURE — 73200 CT UPPER EXTREMITY W/O DYE: CPT

## 2017-06-28 PROCEDURE — 74011000302 HC RX REV CODE- 302: Performed by: HOSPITALIST

## 2017-06-28 PROCEDURE — 74011636320 HC RX REV CODE- 636/320: Performed by: INTERNAL MEDICINE

## 2017-06-28 PROCEDURE — 94640 AIRWAY INHALATION TREATMENT: CPT

## 2017-06-28 PROCEDURE — 74011000258 HC RX REV CODE- 258: Performed by: INTERNAL MEDICINE

## 2017-06-28 PROCEDURE — 87086 URINE CULTURE/COLONY COUNT: CPT | Performed by: HOSPITALIST

## 2017-06-28 PROCEDURE — 87186 SC STD MICRODIL/AGAR DIL: CPT | Performed by: HOSPITALIST

## 2017-06-28 PROCEDURE — 84439 ASSAY OF FREE THYROXINE: CPT | Performed by: HOSPITALIST

## 2017-06-28 PROCEDURE — 99218 HC RM OBSERVATION: CPT

## 2017-06-28 PROCEDURE — 94760 N-INVAS EAR/PLS OXIMETRY 1: CPT

## 2017-06-28 PROCEDURE — 74011250636 HC RX REV CODE- 250/636: Performed by: HOSPITALIST

## 2017-06-28 PROCEDURE — 85610 PROTHROMBIN TIME: CPT | Performed by: INTERNAL MEDICINE

## 2017-06-28 PROCEDURE — 84443 ASSAY THYROID STIM HORMONE: CPT | Performed by: INTERNAL MEDICINE

## 2017-06-28 PROCEDURE — 74011000250 HC RX REV CODE- 250: Performed by: INTERNAL MEDICINE

## 2017-06-28 PROCEDURE — 80164 ASSAY DIPROPYLACETIC ACD TOT: CPT | Performed by: HOSPITALIST

## 2017-06-28 PROCEDURE — 74011636637 HC RX REV CODE- 636/637: Performed by: INTERNAL MEDICINE

## 2017-06-28 RX ORDER — SODIUM CHLORIDE 0.9 % (FLUSH) 0.9 %
10 SYRINGE (ML) INJECTION
Status: ACTIVE | OUTPATIENT
Start: 2017-06-28 | End: 2017-06-29

## 2017-06-28 RX ORDER — ENOXAPARIN SODIUM 100 MG/ML
40 INJECTION SUBCUTANEOUS EVERY 24 HOURS
Status: DISCONTINUED | OUTPATIENT
Start: 2017-06-28 | End: 2017-06-29

## 2017-06-28 RX ORDER — LEVOTHYROXINE SODIUM 125 UG/1
125 TABLET ORAL
Status: DISCONTINUED | OUTPATIENT
Start: 2017-06-29 | End: 2017-07-03 | Stop reason: HOSPADM

## 2017-06-28 RX ORDER — POTASSIUM CHLORIDE 14.9 MG/ML
20 INJECTION INTRAVENOUS
Status: COMPLETED | OUTPATIENT
Start: 2017-06-28 | End: 2017-07-01

## 2017-06-28 RX ORDER — ALBUTEROL SULFATE 0.83 MG/ML
2.5 SOLUTION RESPIRATORY (INHALATION)
Status: DISCONTINUED | OUTPATIENT
Start: 2017-06-28 | End: 2017-07-03 | Stop reason: HOSPADM

## 2017-06-28 RX ORDER — PANTOPRAZOLE SODIUM 40 MG/1
40 TABLET, DELAYED RELEASE ORAL
Status: DISCONTINUED | OUTPATIENT
Start: 2017-06-28 | End: 2017-07-03 | Stop reason: HOSPADM

## 2017-06-28 RX ORDER — MAGNESIUM SULFATE HEPTAHYDRATE 40 MG/ML
2 INJECTION, SOLUTION INTRAVENOUS ONCE
Status: COMPLETED | OUTPATIENT
Start: 2017-06-28 | End: 2017-07-01

## 2017-06-28 RX ADMIN — PANTOPRAZOLE SODIUM 40 MG: 40 TABLET, DELAYED RELEASE ORAL at 16:01

## 2017-06-28 RX ADMIN — MAGNESIUM SULFATE IN WATER 2 G: 40 INJECTION, SOLUTION INTRAVENOUS at 16:00

## 2017-06-28 RX ADMIN — ACETAMINOPHEN 500 MG: 500 TABLET ORAL at 19:57

## 2017-06-28 RX ADMIN — POTASSIUM CHLORIDE 20 MEQ: 14.9 INJECTION, SOLUTION INTRAVENOUS at 17:30

## 2017-06-28 RX ADMIN — CARVEDILOL 6.25 MG: 6.25 TABLET, FILM COATED ORAL at 08:26

## 2017-06-28 RX ADMIN — Medication 10 ML: at 21:03

## 2017-06-28 RX ADMIN — BUDESONIDE 500 MCG: 0.5 SUSPENSION RESPIRATORY (INHALATION) at 20:28

## 2017-06-28 RX ADMIN — FOLIC ACID 1 MG: 1 TABLET ORAL at 08:26

## 2017-06-28 RX ADMIN — PANTOPRAZOLE SODIUM 40 MG: 40 TABLET, DELAYED RELEASE ORAL at 13:17

## 2017-06-28 RX ADMIN — Medication 5 ML: at 14:00

## 2017-06-28 RX ADMIN — WARFARIN SODIUM 4 MG: 1 TABLET ORAL at 16:02

## 2017-06-28 RX ADMIN — ENOXAPARIN SODIUM 40 MG: 40 INJECTION SUBCUTANEOUS at 13:17

## 2017-06-28 RX ADMIN — TUBERCULIN PURIFIED PROTEIN DERIVATIVE 5 UNITS: 5 INJECTION, SOLUTION INTRADERMAL at 16:04

## 2017-06-28 RX ADMIN — POTASSIUM CHLORIDE 20 MEQ: 14.9 INJECTION, SOLUTION INTRAVENOUS at 12:02

## 2017-06-28 RX ADMIN — Medication 5 ML: at 05:38

## 2017-06-28 RX ADMIN — CARVEDILOL 6.25 MG: 6.25 TABLET, FILM COATED ORAL at 16:01

## 2017-06-28 RX ADMIN — FERROUS SULFATE TAB 325 MG (65 MG ELEMENTAL FE) 325 MG: 325 (65 FE) TAB at 05:38

## 2017-06-28 RX ADMIN — LEVOTHYROXINE SODIUM 100 MCG: 100 TABLET ORAL at 05:38

## 2017-06-28 RX ADMIN — POTASSIUM CHLORIDE 20 MEQ: 14.9 INJECTION, SOLUTION INTRAVENOUS at 09:00

## 2017-06-28 RX ADMIN — BUDESONIDE 500 MCG: 0.5 SUSPENSION RESPIRATORY (INHALATION) at 07:14

## 2017-06-28 RX ADMIN — LEVETIRACETAM 500 MG: 100 INJECTION, SOLUTION INTRAVENOUS at 08:29

## 2017-06-28 RX ADMIN — ALBUTEROL SULFATE 2.5 MG: 2.5 SOLUTION RESPIRATORY (INHALATION) at 07:14

## 2017-06-28 RX ADMIN — PREDNISONE 5 MG: 5 TABLET ORAL at 08:26

## 2017-06-28 RX ADMIN — LEVETIRACETAM 500 MG: 100 INJECTION, SOLUTION INTRAVENOUS at 21:03

## 2017-06-28 RX ADMIN — CYANOCOBALAMIN TAB 1000 MCG 1000 MCG: 1000 TAB at 08:26

## 2017-06-28 NOTE — PROGRESS NOTES
Hospitalist Progress Note    2017  Admit Date: 2017  3:05 PM   NAME: Suresh Cordero   :  1945   DOS:              17  MRN:  [id number]   Attending: Sasha Fernandez MD  PCP:  Flynn Tyson MD  Treatment Team: Attending Provider: Emir Huff MD; Utilization Review: Alyce Snyder RN; Care Manager: Ventura Osgood. Teressa Frost    Full Code     SUBJECTIVE:   As previously documented: \" Ms. Manfred Lawton is a 79 yo F with a hx of cad, s/p AVR, pAFibon chronic  Coumadin, Hx of CVA, Hx of DVT, HTN,  Hypothyroidism, dermatomyositis with chronic steroid use with osteoporosis, Hx of Jason ulcers with Endo clip x2  On 2017,  who was admitted on 2017 from skilled nursing with acute encephalopathy and falls. CT head on admission negative for acute abnormalities. Changed to inpatient status on 17\"         17   Ms. Suresh Cordero continues to be lethargic and confused, although able to tell me her name and where she is . Afebrile. Denies SOB, CP or abdominal pain. Extensive review of prior medical records done - noted recent hx of Jason ulcers, hx of seizures with last EEG on 3/28/17 that showed intermittent paroxysmal delta rhythms compatible with generalized epilepsy of primary generalized paroxysm - on on Keppra that was changed to Depakote due to recent hypotensive events and multiple falls, although is unclear if it was d/c by pain medicine provider? Discussed with Ms. Anthony neurologist - Dr. Winter - will repeat EEG today. 10+ ROS reviewed and negative except for positive in HPI.    Allergies   Allergen Reactions    Latex Anaphylaxis    Bee Sting [Sting, Bee] Shortness of Breath     anaphylatics    Adhesive Rash     Including Coban wrap     Current Facility-Administered Medications   Medication Dose Route Frequency Provider Last Rate Last Dose    magnesium sulfate 2 g/50 ml IVPB (premix or compounded)  2 g IntraVENous ONCE Sasha Fernandez MD        potassium chloride 20 mEq in 100 ml IVPB  20 mEq IntraVENous Q2H Thomas Ruano MD 50 mL/hr at 06/28/17 1202 20 mEq at 06/28/17 1202    [START ON 6/29/2017] levothyroxine (SYNTHROID) tablet 125 mcg  125 mcg Oral ACB Thomas Ruano MD        tuberculin injection 5 Units  5 Units IntraDERMal ONCE Thomas Ruano MD        pantoprazole (PROTONIX) tablet 40 mg  40 mg Oral ACB&D Thomas Ruano MD   40 mg at 06/28/17 1317    enoxaparin (LOVENOX) injection 40 mg  40 mg SubCUTAneous Q24H Thomas Ruano MD   40 mg at 06/28/17 1317    albuterol (PROVENTIL VENTOLIN) nebulizer solution 2.5 mg  2.5 mg Nebulization Q6H PRN Thomas Ruano MD        saline peripheral flush soln 10 mL  10 mL InterCATHeter RAD Ortiz Steward MD        iopamidol (ISOVUE-370) 76 % injection 100 mL  100 mL IntraVENous RAD Ortiz Steward MD        warfarin (COUMADIN) tablet 2 mg  2 mg Oral Once per day on Tue Sat Tamar Serrato MD   2 mg at 06/27/17 1609    And    warfarin (COUMADIN) tablet 4 mg  4 mg Oral Once per day on Sun Mon Wed Thu Fri Tamar Serrato MD        acetaminophen (TYLENOL) tablet 500 mg  500 mg Oral Q6H PRN Tamar Serrato MD   500 mg at 06/27/17 0421    carvedilol (COREG) tablet 6.25 mg  6.25 mg Oral BID WITH MEALS Tamar Serrato MD   6.25 mg at 06/28/17 7640    ferrous sulfate tablet 325 mg  1 Tab Oral ACB Tamar Serrato MD   325 mg at 03/87/29 4449    folic acid (FOLVITE) tablet 1 mg  1 mg Oral DAILY Tamar Serrato MD   1 mg at 06/28/17 7842    predniSONE (DELTASONE) tablet 5 mg  5 mg Oral DAILY WITH BREAKFAST Tamar Serrato MD   5 mg at 06/28/17 0826    sodium chloride (NS) flush 5-10 mL  5-10 mL IntraVENous Q8H Tamar Serrato MD   5 mL at 06/28/17 0538    0.9% sodium chloride infusion  100 mL/hr IntraVENous CONTINUOUS Tamar Serrato  mL/hr at 06/27/17 2324 100 mL/hr at 06/27/17 2324    levETIRAcetam (KEPPRA) 500 mg in 0.9% sodium chloride IVPB 500 mg IntraVENous Q12H Tamar Serrato  mL/hr at 17 0829 500 mg at 17 0829    budesonide (PULMICORT) 500 mcg/2 ml nebulizer suspension  500 mcg Nebulization BID RT Tamar Serrato MD   500 mcg at 17 0934         PHYSICAL EXAM     Visit Vitals    /81 (BP 1 Location: Right arm, BP Patient Position: Head of bed elevated (Comment degrees))    Pulse 77    Temp 98.7 °F (37.1 °C)    Resp 16    Ht 5' 3\" (1.6 m)    Wt 57.2 kg (126 lb)    SpO2 94%    BMI 22.32 kg/m2      Temp (24hrs), Av.4 °F (36.9 °C), Min:97.7 °F (36.5 °C), Max:98.7 °F (37.1 °C)    Oxygen Therapy  O2 Sat (%): 94 % (17 1154)  Pulse via Oximetry: 96 beats per minute (17 0718)  O2 Device: Room air (17 0718)    Intake/Output Summary (Last 24 hours) at 17 1348  Last data filed at 17 0350   Gross per 24 hour   Intake              240 ml   Output              125 ml   Net              115 ml        Physical Exam:  General:         Awake, alert with moments of confusion. No acute distress. Afebrile. Cooperative. Looks chronically debilitated  HEENT:               NCAT. No obvious deformity. Nares normal. No drainage. Hematoma on mandibular castillo  Lungs:                  CTABL. No wheezing/rhonchi/rales  Cardiovascular:   RRR. No m/r/g. No pedal edema b/l. +2 PT/DT pulses b/l. Abdomen:       S/nt/nd. Bowel sounds normal. .   Skin:         Hematoma on mandibular area and right elbow. Not Jaundiced  Neurologic:    Awake, alert with moment of confusion. Oriented to self and place. CN II- XII grossly WNL. No gross focal deficit. Heme/Lymph/Immune: No petechiae, echymoses, overt signs of bleeding or lymphadenopathy. Psychiatric:         On/Off confusion.              DIAGNOSTIC STUDIES      Data Review:   Recent Results (from the past 24 hour(s))   CULTURE, URINE    Collection Time: 17  3:35 AM   Result Value Ref Range    Special Requests: NO SPECIAL REQUESTS      Culture result:        NO GROWTH AFTER SHORT PERIOD OF INCUBATION. FURTHER RESULTS TO FOLLOW AFTER OVERNIGHT INCUBATION. PROTHROMBIN TIME + INR    Collection Time: 06/28/17  6:16 AM   Result Value Ref Range    Prothrombin time 16.1 (H) 9.6 - 12.0 sec    INR 1.5 (H) 0.9 - 1.2     CBC W/O DIFF    Collection Time: 06/28/17  6:16 AM   Result Value Ref Range    WBC 7.7 4.3 - 11.1 K/uL    RBC 4.10 4.05 - 5.25 M/uL    HGB 13.0 11.7 - 15.4 g/dL    HCT 38.3 35.8 - 46.3 %    MCV 93.4 79.6 - 97.8 FL    MCH 31.7 26.1 - 32.9 PG    MCHC 33.9 31.4 - 35.0 g/dL    RDW 13.1 11.9 - 14.6 %    PLATELET 423 (L) 932 - 450 K/uL    MPV 9.1 (L) 10.8 - 59.6 FL   METABOLIC PANEL, BASIC    Collection Time: 06/28/17  6:16 AM   Result Value Ref Range    Sodium 142 136 - 145 mmol/L    Potassium 2.7 (LL) 3.5 - 5.1 mmol/L    Chloride 106 98 - 107 mmol/L    CO2 26 21 - 32 mmol/L    Anion gap 10 7 - 16 mmol/L    Glucose 84 65 - 100 mg/dL    BUN 8 8 - 23 MG/DL    Creatinine 0.43 (L) 0.6 - 1.0 MG/DL    GFR est AA >60 >60 ml/min/1.73m2    GFR est non-AA >60 >60 ml/min/1.73m2    Calcium 8.0 (L) 8.3 - 10.4 MG/DL   TSH 3RD GENERATION    Collection Time: 06/28/17  6:16 AM   Result Value Ref Range    TSH 10.800 (H) 0.358 - 3.740 uIU/mL   VITAMIN B12    Collection Time: 06/28/17  6:16 AM   Result Value Ref Range    Vitamin B12 2510 (H) 193 - 986 pg/mL   RPR    Collection Time: 06/28/17  6:16 AM   Result Value Ref Range    RPR NONREACTIVE NR     MAGNESIUM    Collection Time: 06/28/17  6:16 AM   Result Value Ref Range    Magnesium 1.6 (L) 1.8 - 2.4 mg/dL       All Micro Results     Procedure Component Value Units Date/Time    CULTURE, URINE [[id number]] Collected:  06/28/17 0335    Order Status:  Completed Specimen:  Urine from Clean catch Updated:  06/28/17 0913     Special Requests: NO SPECIAL REQUESTS        Culture result:         NO GROWTH AFTER SHORT PERIOD OF INCUBATION. FURTHER RESULTS TO FOLLOW AFTER OVERNIGHT INCUBATION.     MRSA SCREEN - PCR (NASAL) [[id number]] Collected:  06/26/17 2235    Order Status:  Completed Specimen:  Nasal from Nares Updated:  06/27/17 0021     Special Requests: NO SPECIAL REQUESTS        Culture result:         MRSA target DNA is not detected (presumptive not colonized with MRSA)          Imaging [facility] /Studies:      CT Results  (Last 48 hours)               06/26/17 1657  CT HEAD WO CONT Final result    Impression:  IMPRESSION:       1. No acute intracranial findings. 2. Moderate cerebral volume loss. 3. Chronic left maxillary sinus disease. Narrative:  HEAD CT WITHOUT CONTRAST  6/26/2017        HISTORY:   AMS  ; history of head trauma. TECHNIQUE: Noncontrast axial images were obtained through the brain. All CT   scans at this facility used dose modulation, interactive reconstruction and/or   weight based dosing when appropriate to reduce radiation dose to as low as   reasonably achievable. COMPARISON: November 9, 2016       FINDINGS: There is no acute intracranial hemorrhage, significant mass effect or   CT evidence of acute large artery territorial infarction. Please note that a   hyperacute infarct or small vessel infarct may not be apparent on initial CT   imaging. Moderate cerebral volume loss is present. A few areas of low attenuation are   present in the supratentorial white matter, suggestive of chronic small vessel   ischemic disease. There is no hydrocephalus , intra-axial mass or abnormal extra-axial fluid   collection. There are no displaced skull fractures. The left maxillary sinus is   partially opacified. 06/26/17 1657  CT SPINE CERV WO CONT Final result    Impression:  IMPRESSION:       1. No acute findings in the cervical spine. 2. Multilevel facet degenerative change and degenerative disc disease with   foraminal stenosis present as described. Narrative:  CT CERVICAL SPINE WITHOUT CONTRAST 6/26/2017       HISTORY: Trauma. Altered mental status. TECHNIQUE: Noncontrast axial images were obtained from the craniocervical   junction through T3. Multiplanar reformatted images were generated. All CT   scans at this facility used dose modulation, iterative reconstruction and/or   weight based dosing when appropriate to reduce radiation dose to as low as   reasonably achievable. COMPARISON: 2016       FINDINGS: The cervical vertebrae are normal in height and alignment. The   articular facets overlap appropriately. Multilevel facet degenerative change is   present. Degenerative disc disease is present at C3-C4, C4-C5 and C5-C6 with   foraminal stenosis at these levels. There is no prevertebral soft tissue   swelling. Axial images demonstrate no displaced cervical spine fracture. Included portions   of the lung apices are clear. EEG 3/28/17: Technique: This EEG was performed using the [employer] 10/20 System. An EKG was monitored. The length of the recording was 30 minutes. State of Consciousness: awake, drowsy, asleep and awake,drowsy and asleep . Stage II. Description:     10 / 20 IEP 21 channel [employer] equipment bipolar / referential recordings for 30 + minutes using standard montages and technique. Background:  Normal.       8.5 hx 50 uV symmetric. Rhythms:  Normal. Good  Except an intermittent 2.5 Hz high amplitude  To 3 Hz delta activity lasting from 3 to 5 seconds during drowse. Epileptiform:  None. Symmetry :  Normal.     Alpha:  8 hz = normal amount. Normal attenuation with eye opening. Beta:   13 + Hz and good amplitudes :  Normal amount. Normal symmetry. Theta:   5-7 Hz:  Normal amounts. Delta:   2 - 3 hz:   Normal amounts. Is paroxysm rhythm delta rhythm as noted. EK NSR     Activation Procedures:  Hyperventlation: *none/age   Photic Stimulation: done    Interpretation:  This is an abnormal EEG due to the presence of intermittent paroxysmal delta rhythms compatible with generalized epilepsy or primary generalzied paroxysms. Correlation and recommendations[de-identified]   Medication for Anti epileptic therapy is recommended such as divalproex treatment. Labs and Studies from previous 24 hours have been personally reviewed by myself Duc Problems    Diagnosis Date Noted    Hypokalemia 06/28/2017    Hypomagnesemia 06/28/2017    Seizure disorder ([facility] [place] [address] 12/13/2016    Acute encephalopathy 11/09/2016    Long term (current) use of anticoagulants     Immunosuppressed status ([facility] 06/20/2014    GERD (gastroesophageal reflux disease) 01/17/2013    PUD (peptic ulcer disease) 01/17/2013    HTN (hypertension)--Dr. Jamal Bejarano 01/17/2013    S/P AVR (aortic valve replacement) 02/04/2010       Plan:  - recheck EEG; continue  IV Keppra for now, check electrolytes. Will consult neurology  - INR:1.5 - per cardiology notes INR goal of 2.0-3.0 - recent hx of Jason ulcers - will continue Coumadin at this time - per cardiology notes she is on \" 4mg on Wednesday only and 2mg all other days. \"  - add Protonix PO BID for recent hx of Jason ulcers  - check TSH, Vitamin B12, RPR - if encephalopathy not improving will get MRI Brain  - replace K and Mg  - add hydralazine 10 mg IV PRN if BP >160/90 mmHg  - increase Levothyroxine. Check T4, T3  - get R elbow CT to r/o fracture  - PT/OT/PPD - will need long term placement? DVT Prophylaxis: Lovenox  CODE Status: Full CODE   Plan of Care Discussed with: tried to call son  - Mr. Greg Bose - [phone] - unable. Care team.   Medical Risk: high   Disposition: pending    Clinical status changed to inpatient due to prolonged AMS possible due to uncontrolled epilepsy  And multiple co morbidities. For [address] and ROS please see observation H&P, located in the patients chart. I have reviewed the information and there have been no changes.       Sg Montgomery MD  06/28/17

## 2017-06-28 NOTE — PROGRESS NOTES
Warfarin dosing per pharmacist    Fide Dean is a 80 y.o. female. Height: 5' 3\" (160 cm)    Weight: 57.2 kg (126 lb)    Indication:  A. Fib    Goal INR:  2-3    Home dose:  Warfarin 2 mg every evening on Tuesday & Saturday , Warfarin 4 mg every evening on Sunday, Monday, Wednesday, Thursday and Friday. Risk factors or significant drug interactions:  levothyroxine    Other anticoagulants:  none    Daily Monitoring  Date  INR     Warfarin dose HGB              Notes  6/27  2.2 (6/26) 2 mg  13.1    6/28  1.5  4 mg  13.0    Pharmacy consulted to manage warfarin on 6/27/17 by Dr. Melanie Ruiz    80 YOF with a history of A. Fib on warfarin as an outpatient. INR 1.5 today. Scheduled for higher dose this evening. Following daily INR. Thank you,  Veronica Pearson, Pharm. D.   Clinical Pharmacist  [phone]

## 2017-06-28 NOTE — PROGRESS NOTES
Awake pleasantly confused. Denies discomfort. IVF infusing without difficulty. Patient follows commands well.

## 2017-06-28 NOTE — PROGRESS NOTES
Patient stable with no complaints at this time. Patient more stable today and more alert. Patient using nursing call bell today and not yelling. BSC in place and being used. Call light within reach. Will continue to monitor.

## 2017-06-28 NOTE — PROGRESS NOTES
Report given to [facility] for teleneuro. Information requested will be faxed shortly.

## 2017-06-28 NOTE — PROGRESS NOTES
Patient stable with no complaints at this time. Patient finished with tele-neuro and screen returned to 7th floor. Patient more alert and stable than yesterday but still sometimes confused. PPD placed earlier on right arm. Call light within reach.   Report to be given to oncoming RN 7p-7a

## 2017-06-28 NOTE — PROGRESS NOTES
PARMINDER spoke with pt's son about dc plans. He understands that pt cannot return to independent living and reports that he's been looking for an care home. SW referred him to [facility] to help him with resources in the community. Son told PARMINDER that pt was discharged from [facility] about two weeks ago where she had been for about two and half weeks. Pt is in observation status and will need to be changed to inpatient in order to go to [facility]. Pt's son is ok with pt returning to [facility]. SW following.

## 2017-06-28 NOTE — PROGRESS NOTES
Patient in bed resting with no complaints at this time. Patient is alert with confusion but with no distress noted. IV intact and patent with no s/s of infection noted. Respirations even and unlabored with heart rate regular. Patient unable to ambulate independently without assistance; needs x1 r/t weakness and hx of falls. Bed in low locked position with call light within reach. Patient instructed to call if assistance is needed. Will continue to monitor.

## 2017-06-28 NOTE — PROGRESS NOTES
Resting well. Patient becomes agitated when brief is wet but calms and resumes sleeping once clean. Self release lap belt in use. Posey alarm in use for safety.

## 2017-06-29 PROBLEM — N39.0 UTI (URINARY TRACT INFECTION): Status: ACTIVE | Noted: 2017-06-29

## 2017-06-29 LAB
ANION GAP BLD CALC-SCNC: 11 MMOL/L (ref 7–16)
APTT PPP: 80.8 SEC (ref 23.5–31.7)
BUN SERPL-MCNC: 7 MG/DL (ref 8–23)
CALCIUM SERPL-MCNC: 7.6 MG/DL (ref 8.3–10.4)
CHLORIDE SERPL-SCNC: 110 MMOL/L (ref 98–107)
CO2 SERPL-SCNC: 25 MMOL/L (ref 21–32)
CREAT SERPL-MCNC: 0.37 MG/DL (ref 0.6–1)
ERYTHROCYTE [DISTWIDTH] IN BLOOD BY AUTOMATED COUNT: 13.4 % (ref 11.9–14.6)
GLUCOSE SERPL-MCNC: 83 MG/DL (ref 65–100)
HCT VFR BLD AUTO: 39 % (ref 35.8–46.3)
HCT VFR BLD AUTO: 41 % (ref 35.8–46.3)
HGB BLD-MCNC: 13.2 G/DL (ref 11.7–15.4)
HGB BLD-MCNC: 14 G/DL (ref 11.7–15.4)
INR PPP: 1.3 (ref 0.9–1.2)
MAGNESIUM SERPL-MCNC: 1.8 MG/DL (ref 1.8–2.4)
MCH RBC QN AUTO: 31.7 PG (ref 26.1–32.9)
MCHC RBC AUTO-ENTMCNC: 33.8 G/DL (ref 31.4–35)
MCV RBC AUTO: 93.5 FL (ref 79.6–97.8)
MM INDURATION POC: NORMAL MM (ref 0–5)
PHOSPHATE SERPL-MCNC: 1.7 MG/DL (ref 2.3–3.7)
PLATELET # BLD AUTO: 98 K/UL (ref 150–450)
PMV BLD AUTO: 10 FL (ref 10.8–14.1)
POTASSIUM SERPL-SCNC: 2.7 MMOL/L (ref 3.5–5.1)
PPD POC: NORMAL NEGATIVE
PROTHROMBIN TIME: 14.6 SEC (ref 9.6–12)
RBC # BLD AUTO: 4.17 M/UL (ref 4.05–5.25)
SODIUM SERPL-SCNC: 146 MMOL/L (ref 136–145)
WBC # BLD AUTO: 7 K/UL (ref 4.3–11.1)

## 2017-06-29 PROCEDURE — 97530 THERAPEUTIC ACTIVITIES: CPT

## 2017-06-29 PROCEDURE — 74011000250 HC RX REV CODE- 250: Performed by: INTERNAL MEDICINE

## 2017-06-29 PROCEDURE — 85027 COMPLETE CBC AUTOMATED: CPT | Performed by: HOSPITALIST

## 2017-06-29 PROCEDURE — 80048 BASIC METABOLIC PNL TOTAL CA: CPT | Performed by: HOSPITALIST

## 2017-06-29 PROCEDURE — 65660000000 HC RM CCU STEPDOWN

## 2017-06-29 PROCEDURE — 97535 SELF CARE MNGMENT TRAINING: CPT

## 2017-06-29 PROCEDURE — 85018 HEMOGLOBIN: CPT | Performed by: HOSPITALIST

## 2017-06-29 PROCEDURE — 74011250636 HC RX REV CODE- 250/636: Performed by: HOSPITALIST

## 2017-06-29 PROCEDURE — 74011250637 HC RX REV CODE- 250/637: Performed by: INTERNAL MEDICINE

## 2017-06-29 PROCEDURE — 84100 ASSAY OF PHOSPHORUS: CPT | Performed by: INTERNAL MEDICINE

## 2017-06-29 PROCEDURE — 74011636637 HC RX REV CODE- 636/637: Performed by: INTERNAL MEDICINE

## 2017-06-29 PROCEDURE — 74011000258 HC RX REV CODE- 258: Performed by: HOSPITALIST

## 2017-06-29 PROCEDURE — 85730 THROMBOPLASTIN TIME PARTIAL: CPT | Performed by: HOSPITALIST

## 2017-06-29 PROCEDURE — 74011250637 HC RX REV CODE- 250/637: Performed by: HOSPITALIST

## 2017-06-29 PROCEDURE — 94760 N-INVAS EAR/PLS OXIMETRY 1: CPT

## 2017-06-29 PROCEDURE — 97166 OT EVAL MOD COMPLEX 45 MIN: CPT

## 2017-06-29 PROCEDURE — 94640 AIRWAY INHALATION TREATMENT: CPT

## 2017-06-29 PROCEDURE — 97110 THERAPEUTIC EXERCISES: CPT

## 2017-06-29 PROCEDURE — 36415 COLL VENOUS BLD VENIPUNCTURE: CPT | Performed by: INTERNAL MEDICINE

## 2017-06-29 PROCEDURE — 74011000258 HC RX REV CODE- 258: Performed by: INTERNAL MEDICINE

## 2017-06-29 PROCEDURE — 74011250636 HC RX REV CODE- 250/636: Performed by: INTERNAL MEDICINE

## 2017-06-29 PROCEDURE — 83735 ASSAY OF MAGNESIUM: CPT | Performed by: INTERNAL MEDICINE

## 2017-06-29 PROCEDURE — 85610 PROTHROMBIN TIME: CPT | Performed by: INTERNAL MEDICINE

## 2017-06-29 RX ORDER — HYDRALAZINE HYDROCHLORIDE 20 MG/ML
10 INJECTION INTRAMUSCULAR; INTRAVENOUS
Status: DISCONTINUED | OUTPATIENT
Start: 2017-06-29 | End: 2017-07-03 | Stop reason: HOSPADM

## 2017-06-29 RX ORDER — LEVETIRACETAM 500 MG/1
500 TABLET ORAL 2 TIMES DAILY
Status: DISCONTINUED | OUTPATIENT
Start: 2017-06-29 | End: 2017-07-03 | Stop reason: HOSPADM

## 2017-06-29 RX ORDER — MAGNESIUM SULFATE 1 G/100ML
1 INJECTION INTRAVENOUS ONCE
Status: COMPLETED | OUTPATIENT
Start: 2017-06-29 | End: 2017-07-01

## 2017-06-29 RX ORDER — ENOXAPARIN SODIUM 100 MG/ML
40 INJECTION SUBCUTANEOUS EVERY 24 HOURS
Status: DISCONTINUED | OUTPATIENT
Start: 2017-06-30 | End: 2017-07-01

## 2017-06-29 RX ORDER — SULFASALAZINE 500 MG/1
1000 TABLET ORAL 2 TIMES DAILY WITH MEALS
Status: DISCONTINUED | OUTPATIENT
Start: 2017-06-29 | End: 2017-07-03 | Stop reason: HOSPADM

## 2017-06-29 RX ORDER — SODIUM,POTASSIUM PHOSPHATES 280-250MG
1 POWDER IN PACKET (EA) ORAL 4 TIMES DAILY
Status: COMPLETED | OUTPATIENT
Start: 2017-06-29 | End: 2017-06-30

## 2017-06-29 RX ORDER — CARVEDILOL 12.5 MG/1
12.5 TABLET ORAL 2 TIMES DAILY WITH MEALS
Status: DISCONTINUED | OUTPATIENT
Start: 2017-06-30 | End: 2017-07-03 | Stop reason: HOSPADM

## 2017-06-29 RX ORDER — HEPARIN SODIUM 5000 [USP'U]/100ML
18-36 INJECTION, SOLUTION INTRAVENOUS
Status: DISCONTINUED | OUTPATIENT
Start: 2017-06-29 | End: 2017-06-29

## 2017-06-29 RX ORDER — POTASSIUM CHLORIDE 14.9 MG/ML
20 INJECTION INTRAVENOUS
Status: COMPLETED | OUTPATIENT
Start: 2017-06-29 | End: 2017-07-01

## 2017-06-29 RX ORDER — DICYCLOMINE HYDROCHLORIDE 10 MG/1
10 CAPSULE ORAL
Status: DISCONTINUED | OUTPATIENT
Start: 2017-06-29 | End: 2017-07-03 | Stop reason: HOSPADM

## 2017-06-29 RX ADMIN — PANTOPRAZOLE SODIUM 40 MG: 40 TABLET, DELAYED RELEASE ORAL at 16:42

## 2017-06-29 RX ADMIN — FERROUS SULFATE TAB 325 MG (65 MG ELEMENTAL FE) 325 MG: 325 (65 FE) TAB at 06:16

## 2017-06-29 RX ADMIN — POTASSIUM & SODIUM PHOSPHATES POWDER PACK 280-160-250 MG 1 PACKET: 280-160-250 PACK at 21:52

## 2017-06-29 RX ADMIN — HEPARIN SODIUM AND DEXTROSE 18 UNITS/KG/HR: 5000; 5 INJECTION INTRAVENOUS at 12:08

## 2017-06-29 RX ADMIN — MAGNESIUM SULFATE HEPTAHYDRATE 1 G: 1 INJECTION, SOLUTION INTRAVENOUS at 12:00

## 2017-06-29 RX ADMIN — FOLIC ACID 1 MG: 1 TABLET ORAL at 08:19

## 2017-06-29 RX ADMIN — CEFTRIAXONE 1 G: 1 INJECTION, POWDER, FOR SOLUTION INTRAMUSCULAR; INTRAVENOUS at 12:00

## 2017-06-29 RX ADMIN — DICYCLOMINE HYDROCHLORIDE 10 MG: 10 CAPSULE ORAL at 20:42

## 2017-06-29 RX ADMIN — WARFARIN SODIUM 4 MG: 1 TABLET ORAL at 16:42

## 2017-06-29 RX ADMIN — POTASSIUM & SODIUM PHOSPHATES POWDER PACK 280-160-250 MG 1 PACKET: 280-160-250 PACK at 12:00

## 2017-06-29 RX ADMIN — LEVETIRACETAM 500 MG: 100 INJECTION, SOLUTION INTRAVENOUS at 08:19

## 2017-06-29 RX ADMIN — POTASSIUM CHLORIDE 20 MEQ: 14.9 INJECTION, SOLUTION INTRAVENOUS at 16:39

## 2017-06-29 RX ADMIN — POTASSIUM & SODIUM PHOSPHATES POWDER PACK 280-160-250 MG 1 PACKET: 280-160-250 PACK at 17:00

## 2017-06-29 RX ADMIN — CARVEDILOL 6.25 MG: 6.25 TABLET, FILM COATED ORAL at 08:19

## 2017-06-29 RX ADMIN — CARVEDILOL 6.25 MG: 6.25 TABLET, FILM COATED ORAL at 16:42

## 2017-06-29 RX ADMIN — PANTOPRAZOLE SODIUM 40 MG: 40 TABLET, DELAYED RELEASE ORAL at 06:15

## 2017-06-29 RX ADMIN — BUDESONIDE 500 MCG: 0.5 SUSPENSION RESPIRATORY (INHALATION) at 19:35

## 2017-06-29 RX ADMIN — Medication 5 ML: at 14:00

## 2017-06-29 RX ADMIN — HYDRALAZINE HYDROCHLORIDE 10 MG: 20 INJECTION INTRAMUSCULAR; INTRAVENOUS at 20:42

## 2017-06-29 RX ADMIN — LEVETIRACETAM 500 MG: 500 TABLET ORAL at 16:42

## 2017-06-29 RX ADMIN — BUDESONIDE 500 MCG: 0.5 SUSPENSION RESPIRATORY (INHALATION) at 07:23

## 2017-06-29 RX ADMIN — PREDNISONE 5 MG: 5 TABLET ORAL at 08:19

## 2017-06-29 RX ADMIN — POTASSIUM CHLORIDE 20 MEQ: 14.9 INJECTION, SOLUTION INTRAVENOUS at 21:00

## 2017-06-29 RX ADMIN — SULFASALAZINE 1000 MG: 500 TABLET ORAL at 20:11

## 2017-06-29 RX ADMIN — ACETAMINOPHEN 500 MG: 500 TABLET ORAL at 20:42

## 2017-06-29 RX ADMIN — Medication 10 ML: at 21:52

## 2017-06-29 RX ADMIN — POTASSIUM CHLORIDE 20 MEQ: 14.9 INJECTION, SOLUTION INTRAVENOUS at 18:45

## 2017-06-29 RX ADMIN — LEVOTHYROXINE SODIUM 125 MCG: 125 TABLET ORAL at 06:16

## 2017-06-29 RX ADMIN — Medication 10 ML: at 06:16

## 2017-06-29 NOTE — PROGRESS NOTES
Hospitalist Progress Note    2017  Admit Date: 2017  3:05 PM   NAME: Anushka Funes   :  1945   DOS:              17  MRN:  [id number]   Attending: Simi Keith MD  PCP:  Tico Arredondo MD  Treatment Team: Attending Provider: Jasmeet Hwang MD; Utilization Review: Maureen Rosenthal RN; Care Manager: Shannon Johnston. Nguyen Castellanos    Full Code     SUBJECTIVE:   As previously documented: \" Ms. Bunny Connell is a 79 yo F with a hx of cad, s/p AVR, pAFib on chronic  Coumadin, Hx of CVA, Hx of DVT, HTN,  Hypothyroidism, dermatomyositis with chronic steroid use with osteoporosis, Hx of Jason ulcers with Endo clip x2  On 2017,  who was admitted on 2017 from [facility] with acute encephalopathy and falls. CT head on admission negative for acute abnormalities. Changed to inpatient status on 17. Extensive review of prior medical records done - noted recent hx of Jason ulcers, hx of seizures with last EEG on 3/28/17 that showed intermittent paroxysmal delta rhythms compatible with generalized epilepsy of primary generalized paroxysm - on on Keppra that was changed to Depakote due to recent hypotensive events and multiple falls, although is unclear if  still taking it. EEG and neurology consult ordered. \"         17   Ms. Anushka Funes mental status improved - still with some periodic confusion - improving. Afebrile. Denies SOB, CP or abdominal pain. Complains of multiple BM - home sulfasalazine restarted. BP elevated. 10+ ROS reviewed and negative except for positive in HPI.    Allergies   Allergen Reactions    Latex Anaphylaxis    Bee Sting [Sting, Bee] Shortness of Breath     anaphylatics    Adhesive Rash     Including Coban wrap     Current Facility-Administered Medications   Medication Dose Route Frequency Provider Last Rate Last Dose    potassium, sodium phosphates (NEUTRA-PHOS) packet 1 Packet  1 Packet Oral QID Simi Keith MD   1 Packet at 17 1700    levETIRAcetam (KEPPRA) tablet 500 mg  500 mg Oral BID Radha Alex MD   500 mg at 06/29/17 1642    cefTRIAXone (ROCEPHIN) 1 g in 0.9% sodium chloride (MBP/ADV) 50 mL  1 g IntraVENous Q24H Radha Alex  mL/hr at 06/29/17 1200 1 g at 06/29/17 1200    dicyclomine (BENTYL) capsule 10 mg  10 mg Oral DAILY PRN Radha Alex MD   10 mg at 06/29/17 2042    sulfaSALAzine (AZULFIDINE) tablet 1,000 mg  1,000 mg Oral BID WITH MEALS Radha Alex MD   1,000 mg at 06/29/17 2011    hydrALAZINE (APRESOLINE) 20 mg/mL injection 10 mg  10 mg IntraVENous Q6H PRN Radha Alex MD   10 mg at 06/29/17 2042    [START ON 6/30/2017] carvedilol (COREG) tablet 12.5 mg  12.5 mg Oral BID WITH MEALS Radha Alex MD        levothyroxine (SYNTHROID) tablet 125 mcg  125 mcg Oral ACB Radha Alex MD   125 mcg at 06/29/17 0616    pantoprazole (PROTONIX) tablet 40 mg  40 mg Oral ACB&D Radha Alex MD   40 mg at 06/29/17 1642    albuterol (PROVENTIL VENTOLIN) nebulizer solution 2.5 mg  2.5 mg Nebulization Q6H PRN Radha Alex MD        PPD Read Reminder  1 Each Other Q24H Walker Tang MD   1 Each at 06/29/17 1600    warfarin (COUMADIN) tablet 2 mg  2 mg Oral Once per day on Tue Sat Walker Tang MD   2 mg at 06/27/17 1609    And    warfarin (COUMADIN) tablet 4 mg  4 mg Oral Once per day on Sun Mon Wed Thu Fri Walker Tang MD   4 mg at 06/29/17 1642    acetaminophen (TYLENOL) tablet 500 mg  500 mg Oral Q6H PRN Walker Tang MD   500 mg at 06/29/17 2042    ferrous sulfate tablet 325 mg  1 Tab Oral ACB Walker Tang MD   325 mg at 38/30/03 1602    folic acid (FOLVITE) tablet 1 mg  1 mg Oral DAILY Walker Tang MD   1 mg at 06/29/17 0819    predniSONE (DELTASONE) tablet 5 mg  5 mg Oral DAILY WITH BREAKFAST Walker Tang MD   5 mg at 06/29/17 0819    sodium chloride (NS) flush 5-10 mL  5-10 mL IntraVENous Q8H Walker Tang MD   5 mL at 06/29/17 1400    budesonide (PULMICORT) 500 mcg/2 ml nebulizer suspension  500 mcg Nebulization BID RT Nichole Mary MD   500 mcg at 17         PHYSICAL EXAM     Visit Vitals    BP (!) 163/98  Comment: gave PRN BP med    Pulse 87    Temp 97.6 °F (36.4 °C)    Resp 16    Ht 5' 3\" (1.6 m)    Wt 57.2 kg (126 lb)    SpO2 94%    BMI 22.32 kg/m2      Temp (24hrs), Av.9 °F (36.6 °C), Min:97.4 °F (36.3 °C), Max:98.4 °F (36.9 °C)    Oxygen Therapy  O2 Sat (%): 94 % (17)  Pulse via Oximetry: 74 beats per minute (17)  O2 Device: Room air (17)    Intake/Output Summary (Last 24 hours) at 17 2148  Last data filed at 17 1353   Gross per 24 hour   Intake              286 ml   Output                0 ml   Net              286 ml        Physical Exam:  General:         Awake, alert. No acute distress. Afebrile. Cooperative. Looks chronically debilitated  HEENT:               NCAT. No obvious deformity. Nares normal. No drainage. Hematoma on mandibular castillo  Lungs:                  CTABL. No wheezing/rhonchi/rales  Cardiovascular:   RRR. No m/r/g. No pedal edema b/l. +2 PT/DT pulses b/l. Abdomen:       S/nt/nd. Bowel sounds normal. .   Skin:         Hematoma on mandibular area and right elbow. Not Jaundiced  Neurologic:    AAOX3. Sandra Foy CN II- XII grossly WNL. No gross focal deficit. Heme/Lymph/Immune: No petechiae, echymoses, overt signs of bleeding or lymphadenopathy. Psychiatric:         Euthymic.            DIAGNOSTIC STUDIES      Data Review:   Recent Results (from the past 24 hour(s))   PROTHROMBIN TIME + INR    Collection Time: 17  6:55 AM   Result Value Ref Range    Prothrombin time 14.6 (H) 9.6 - 12.0 sec    INR 1.3 (H) 0.9 - 1.2     MAGNESIUM    Collection Time: 17  6:55 AM   Result Value Ref Range    Magnesium 1.8 1.8 - 2.4 mg/dL   PHOSPHORUS    Collection Time: 17  6:55 AM   Result Value Ref Range    Phosphorus 1.7 (L) 2.3 - 3.7 MG/DL CBC W/O DIFF    Collection Time: 06/29/17  6:55 AM   Result Value Ref Range    WBC 7.0 4.3 - 11.1 K/uL    RBC 4.17 4.05 - 5.25 M/uL    HGB 13.2 11.7 - 15.4 g/dL    HCT 39.0 35.8 - 46.3 %    MCV 93.5 79.6 - 97.8 FL    MCH 31.7 26.1 - 32.9 PG    MCHC 33.8 31.4 - 35.0 g/dL    RDW 13.4 11.9 - 14.6 %    PLATELET 98 (L) 825 - 450 K/uL    MPV 10.0 (L) 10.8 - 55.3 FL   METABOLIC PANEL, BASIC    Collection Time: 06/29/17  6:55 AM   Result Value Ref Range    Sodium 146 (H) 136 - 145 mmol/L    Potassium 2.7 (LL) 3.5 - 5.1 mmol/L    Chloride 110 (H) 98 - 107 mmol/L    CO2 25 21 - 32 mmol/L    Anion gap 11 7 - 16 mmol/L    Glucose 83 65 - 100 mg/dL    BUN 7 (L) 8 - 23 MG/DL    Creatinine 0.37 (L) 0.6 - 1.0 MG/DL    GFR est AA >60 >60 ml/min/1.73m2    GFR est non-AA >60 >60 ml/min/1.73m2    Calcium 7.6 (L) 8.3 - 10.4 MG/DL   PLEASE READ & DOCUMENT PPD TEST IN 24 HRS    Collection Time: 06/29/17  4:10 PM   Result Value Ref Range    PPD  Negative    mm Induration  0 mm   HGB & HCT    Collection Time: 06/29/17  6:16 PM   Result Value Ref Range    HGB 14.0 11.7 - 15.4 g/dL    HCT 41.0 35.8 - 46.3 %   PTT    Collection Time: 06/29/17  6:16 PM   Result Value Ref Range    aPTT 80.8 (H) 23.5 - 31.7 SEC       All Micro Results     Procedure Component Value Units Date/Time    CULTURE, STOOL [[id number]]     Order Status:  Sent Specimen:  Stool     C. DIFFICILE/EPI PCR [[id number]]     Order Status:  Sent Specimen:  Stool     CULTURE, URINE [[id number]]  (Abnormal) Collected:  06/28/17 0335    Order Status:  Completed Specimen:  Urine from Clean catch Updated:  06/29/17 0826     Special Requests: NO SPECIAL REQUESTS        Culture result:       >100,000  COLONIES/mL  GRAM NEGATIVE RODS  IDENTIFICATION AND SUSCEPTIBILITY TO FOLLOW   (A)            <10,000  COLONIES/mL  NORMAL SKIN [facility] ISOLATED      MRSA SCREEN - PCR (NASAL) [[id number]] Collected:  06/26/17 2235    Order Status:  Completed Specimen:  Nasal from Nares Updated:  06/27/17 0021 Special Requests: NO SPECIAL REQUESTS        Culture result:         MRSA target DNA is not detected (presumptive not colonized with MRSA)          Imaging /Procedures /Studies:      CT Results  (Last 48 hours)               06/26/17 1657  CT HEAD WO CONT Final result    Impression:  IMPRESSION:       1. No acute intracranial findings. 2. Moderate cerebral volume loss. 3. Chronic left maxillary sinus disease. Narrative:  HEAD CT WITHOUT CONTRAST  6/26/2017        HISTORY:   AMS  ; history of head trauma. TECHNIQUE: Noncontrast axial images were obtained through the brain. All CT   scans at this facility used dose modulation, interactive reconstruction and/or   weight based dosing when appropriate to reduce radiation dose to as low as   reasonably achievable. COMPARISON: November 9, 2016       FINDINGS: There is no acute intracranial hemorrhage, significant mass effect or   CT evidence of acute large artery territorial infarction. Please note that a   hyperacute infarct or small vessel infarct may not be apparent on initial CT   imaging. Moderate cerebral volume loss is present. A few areas of low attenuation are   present in the supratentorial white matter, suggestive of chronic small vessel   ischemic disease. There is no hydrocephalus , intra-axial mass or abnormal extra-axial fluid   collection. There are no displaced skull fractures. The left maxillary sinus is   partially opacified. 06/26/17 1657  CT SPINE CERV WO CONT Final result    Impression:  IMPRESSION:       1. No acute findings in the cervical spine. 2. Multilevel facet degenerative change and degenerative disc disease with   foraminal stenosis present as described. Narrative:  CT CERVICAL SPINE WITHOUT CONTRAST 6/26/2017       HISTORY: Trauma. Altered mental status. TECHNIQUE: Noncontrast axial images were obtained from the craniocervical   junction through T3.  Multiplanar reformatted images were generated. All CT   scans at this facility used dose modulation, iterative reconstruction and/or   weight based dosing when appropriate to reduce radiation dose to as low as   reasonably achievable. COMPARISON: 2016       FINDINGS: The cervical vertebrae are normal in height and alignment. The   articular facets overlap appropriately. Multilevel facet degenerative change is   present. Degenerative disc disease is present at C3-C4, C4-C5 and C5-C6 with   foraminal stenosis at these levels. There is no prevertebral soft tissue   swelling. Axial images demonstrate no displaced cervical spine fracture. Included portions   of the lung apices are clear. EEG 3/28/17: Technique: This EEG was performed using the [employer] 10/20 System. An EKG was monitored. The length of the recording was 30 minutes. State of Consciousness: awake, drowsy, asleep and awake,drowsy and asleep . Stage II. Description:     10 / 20 IEP 21 channel [employer] equipment bipolar / referential recordings for 30 + minutes using standard montages and technique. Background:  Normal.       8.5 hx 50 uV symmetric. Rhythms:  Normal. Good  Except an intermittent 2.5 Hz high amplitude  To 3 Hz delta activity lasting from 3 to 5 seconds during drowse. Epileptiform:  None. Symmetry :  Normal.     Alpha:  8 hz = normal amount. Normal attenuation with eye opening. Beta:   13 + Hz and good amplitudes :  Normal amount. Normal symmetry. Theta:   5-7 Hz:  Normal amounts. Delta:   2 - 3 hz:   Normal amounts. Is paroxysm rhythm delta rhythm as noted. EK NSR     Activation Procedures:  Hyperventlation: *none/age   Photic Stimulation: done    Interpretation: This is an abnormal EEG due to the presence of intermittent paroxysmal delta rhythms compatible with generalized epilepsy or primary generalzied paroxysms.         Correlation and recommendations[de-identified]   Medication for Anti epileptic therapy is recommended such as divalproex treatment. Labs and Studies from previous 24 hours have been personally reviewed by myself Duc Ragland    Diagnosis Date Noted    UTI (urinary tract infection) 06/29/2017    Hypokalemia 06/28/2017    Hypomagnesemia 06/28/2017    Acute metabolic encephalopathy 63/87/3486    Seizure disorder ([facility] 12/13/2016    Acute encephalopathy 11/09/2016    Long term (current) use of anticoagulants     Immunosuppressed status ([facility] 06/20/2014    GERD (gastroesophageal reflux disease) 01/17/2013    PUD (peptic ulcer disease) 01/17/2013    HTN (hypertension)--Dr. Patrick Ruelas 01/17/2013    S/P AVR (aortic valve replacement) 02/04/2010       Plan:  -  Mental status improving, still with some on/off confusion  r- seen by teleneurology - recommended to continue Keppra only. Repeat EEG pending.  - INR:1.3 - per cardiology notes INR goal of 2.0-3.0 - considered to add heparin ggt while subtherapeutic , although risk of bleeding higher due to recent hx of Jason ulcers  - coumadin management per phramacy   - on Protonix PO BID for recent hx of Jason ulcers  - levothyroxine increased - TSH:10.80 - will need repeat TSH in 2-3 weeks. -  Vitamin B1:2510, RPR : nonreactive. - replace K and Mg  0- urine culture with GNR - started on IV Rocephin - f/u culture  - increased Coreg - PRN  hydralazine 10 mg IV PRN if BP >160/90 mmHg  - increase Levothyroxine. Check T4, T3  - get stool studies  -  R elbow CT w/o fracture  - PT/OT/PPD - will need long term placement? DVT Prophylaxis: Lovenox  CODE Status: Full CODE   Plan of Care Discussed with: patient.  Care team  Medical Risk: high   Disposition: pending          Huy Reyes MD  06/29/17

## 2017-06-29 NOTE — PROGRESS NOTES
Please note that PT has been seeing Ms. Fernandez Parent since the 27th. Will see today for treatment. On the 27th she required min assist for activity. Was very confused and not following directions well. Please refer to PT note after treatment today for update.   Josie Adamson, PT

## 2017-06-29 NOTE — PROGRESS NOTES
Problem: Self Care Deficits Care Plan (Adult)  Goal: *Acute Goals and Plan of Care (Insert Text)  1. Patient will complete full body bathing and dressing with setup and adaptive equipment as needed. 2. Patient will complete toileting with supervision. 3. Patient will tolerate 25 minutes of OT treatment with as needed rest breaks to increase activity tolerance for ADLs. 4. Patient will complete functional transfers with independence and adaptive equipment as needed. 5. Patient will complete grooming tasks with setup only in supported sitting at sink level. Timeframe: 7 visits       OCCUPATIONAL THERAPY: Initial Assessment, Treatment Day: Day of Assessment and AM 6/29/2017  INPATIENT: Hospital Day: 4  [place] / [place] / Product Type: Medicare /      NAME/AGE/GENDER: Alana Shore is a 80 y.o. female     PRIMARY DIAGNOSIS:  Acute encephalopathy  Acute encephalopathy  Acute metabolic encephalopathy Acute encephalopathy Acute encephalopathy        ICD-10: Treatment Diagnosis:        · Generalized Muscle Weakness (M62.81)  · Other lack of cordination (R27.8)  · Difficulty in walking, Not elsewhere classified (R26.2)   Precautions/Allergies:        falls,  Latex; Bee sting [sting, bee]; and Adhesive       ASSESSMENT:      Ms. Dimitri Rangel presents supine in bed with safety restraint across her. RN Alberto Silva cleared OT to get pt up. Pt agreeable to OT evaluation. Was alert and oriented, though did have random comments throughout evaluation. Pleasant and cooperative. Pt had finished breakfast and asked for help going to the bathroom. Removed restraints and turned off alarm. Pt up to edge of bed with CGA. Stood with CGA and transferred to [facility] at bedside instead of toilet per pt preference with CGA only. Minimal assist to remove brief. Pt had loose stool and voided and then wiped herself but asked OT to go over it again. OT did.   Pt returned to sitting edge of bed and OT completed B UE MMT, weak but WFLs for basic self care tasks. Does have B trigger fingers on middle digit. Reports they have been like that for years and the doctor said operating would not help her. Pt washed hands after setup and then brushed hair, and asked to return to supine rather than sitting up in recliner. Pt bridged while OT assisted pt with placing brief on,\"I am having a lot of diarrhea lately so let's be careful and prevent a mess. \" Pt left in supine with restraints replaced, alarms on and 3 rails up with tray table and all need in reach on other bedrail side. Pt was living in independent living at [facility] and reports she was independent with all self care tasks, used a rollator, went to the dining room for most of her meals, used the facility bus to go out in community and to get groceries. Has small kitchen in apt and prepared simple meals. Admits to 4 falls in the last 6 months. Reports she knows she is going to have to move to an senior care. Has dentures but they do not fit well. Pt is functioning below her baseline and would benefit from skilled OT (medically necessary) to maximize her independence with self care tasks an functional mobility. Will follow while in acute. Would benefit from further rehab. This section established at most recent assessment   PROBLEM LIST (Impairments causing functional limitations):  1. Decreased Strength  2. Decreased ADL/Functional Activities  3. Decreased Transfer Abilities  4. Decreased Ambulation Ability/Technique  5. Decreased Balance  6. Decreased Activity Tolerance  7. Decreased Pacing Skills  8. Decreased Work Simplification/Energy Conservation Techniques  9. Decreased Cognition    INTERVENTIONS PLANNED: (Benefits and precautions of occupational therapy have been discussed with the patient.)  1. Activities of daily living training  2. Adaptive equipment training  3. Balance training  4. Clothing management  5. Cognitive training  6.  Community reintergration  7. Donning&doffing training  8. Group therapy  9. Therapeutic activity  10. Therapeutic exercise  11. Safety training  12. Energy conservation      TREATMENT PLAN: Frequency/Duration: Follow patient 3x per week to address above goals. Rehabilitation Potential For Stated Goals: GOOD      RECOMMENDED REHABILITATION/EQUIPMENT: (at time of discharge pending progress): Continue Skilled Therapy. OCCUPATIONAL PROFILE AND HISTORY:   History of Present Injury/Illness (Reason for Referral): As previously documented: \" Ms. Reginald Hyatt is a 79 yo F with a hx of cad, s/p AVR, pAFibon chronic  Coumadin, Hx of CVA, Hx of DVT, HTN,  Hypothyroidism, dermatomyositis with chronic steroid use with osteoporosis, Hx of Jason ulcers with Endo clip x2  On April 2017,  who was admitted on 6/26/2017 from [facility] with acute encephalopathy and falls. CT head on admission negative for acute abnormalities. Changed to inpatient status on 6/26/17\"        06/28/17   Ms. José Coon continues to be lethargic and confused, although able to tell me her name and where she is . Afebrile. Denies SOB, CP or abdominal pain. Extensive review of prior medical records done - noted recent hx of Jason ulcers, hx of seizures with last EEG on 3/28/17 that showed intermittent paroxysmal delta rhythms compatible with generalized epilepsy of primary generalized paroxysm - on on Keppra that was changed to Depakote due to recent hypotensive events and multiple falls, although is unclear if it was d/c by pain medicine provider? Discussed with Ms. Cruz neurologist - Dr. Larry Arevalo - will repeat EEG today. Past Medical History/Comorbidities:   Ms. Reginald Hyatt  has a past medical history of A-fib ([facility] [address] Acquired hypothyroidism; Acute CVA (cerebrovascular accident) ([facility] [address] (6/21/2014); Acute lacunar infarction [facility]); Anxiety state, unspecified; Aortic valve stenosis, congenital (2/19/2016); CAD (coronary artery disease);  Candida Esophagitis (2/11/2010); Chronic kidney disease; Chronic pain; Depression; Dermatomyositis ([facility] [address] Duodenal ulcer; Dysphagia (2/19/2016); Embolism and thrombosis of unspecified artery ([facility] [address] (9/12/2014); Fibromyalgia; History of anemia (03/12/2012); History of drug overdose (09/2016); History of encephalopathy (02/04/2010); History of fracture of hip (03/10/2012); History of mastectomy; Hypertension; Immunosuppressed status ([facility] [address] (6/20/2014); Long term (current) use of anticoagulants; Malignant tumor of colon ([facility] [address] Osteoarthritis; Other ill-defined cerebrovascular disease; Pain in joint, pelvic region and thigh; Personal history of fall; Postmenopausal atrophic vaginitis; Primary localized osteoarthrosis, pelvic region and thigh; PUD (peptic ulcer disease) (2009); PVD (peripheral vascular disease) ([facility] [address] Reflux esophagitis; S/P AVR (aortic valve replacement); Seasonal allergic rhinitis due to pollen; Sleep disturbance; Symptomatic menopausal or female climacteric states; Thromboembolus ([facility] [address] (2007); Unspecified disorder of liver; and Urticaria. Ms. Deepak Dalal  has a past surgical history that includes hysterectomy (1975); tonsillectomy; other surgical; carpal tunnel release (Right, 1995); abdomen surgery proc unlisted (1990); colonoscopy (9/25/13); colonoscopy (2010); cataract removal (Right, 3/20/2014); tumor removal (1986); mastectomy (Bilateral, 2000); tonsil and adenoidectomy; aortic valve replacement (2004); aortic valve replacement; cholecystectomy; total hip arthroplasty (Right, 12/2012, 3/11/12, 2/2013); revise total hip replacement (Right, 1/17/13); and acl reconstruction (Left, 1979).   Social History/Living Environment:   Home Environment: Independent living ([facility])  # Steps to Enter: 0  One/Two Story Residence: One story  Living Alone: Yes  Support Systems: Child(adan), Home care staff, Friends \ neighbors  Patient Expects to be Discharged to[de-identified] Assisted living  Current DME Used/Available at Home: Pravin bars, Commode, bedside, Cane, straight, Shower chair, Walker, rollator, Other (comment) ([employer])  Tub or Shower Type: Tub/Shower combination  Prior Level of Function/Work/Activity:  Pt was living in independent living at [facility] and reports she was independent with all self care tasks, used a rollator, went to the dining room for most of her meals, used the facility bus to go out in community and to get groceries. Has small kitchen in [facility] and prepared simple meals. Pt reports son Germania Beatty in [facility], but \"he's not much help to me. \"  Spouse  5 yrs ago. Dominant Side:         RIGHT   Number of Personal Factors/Comorbidities that affect the Plan of Care: Extensive review of physical, cognitive, and psychosocial performance (3+):  HIGH COMPLEXITY   ASSESSMENT OF OCCUPATIONAL PERFORMANCE[de-identified]   Activities of Daily Living: Toileting, handwashing, grooming, dressing  Basic ADLs (From Assessment) Complex ADLs (From Assessment)   Basic ADL  Feeding: Setup (ate all of breakfast tray)  Oral Facial Hygiene/Grooming: Minimum assistance  Bathing: Minimum assistance  Upper Body Dressing: Minimum assistance  Lower Body Dressing: Minimum assistance  Toileting: Minimum assistance (wiped after loose stool) Instrumental ADL  Meal Preparation: Total assistance (goes to DR for most meals)  Homemaking: Total assistance (family assists)  Medication Management: Setup  Financial Management: Supervision (family assists)   Grooming/Bathing/Dressing Activities of Daily Living     Cognitive Retraining  Safety/Judgement: Decreased awareness of environment;Decreased awareness of need for assistance;Decreased awareness of need for safety; Fall prevention                 Functional Transfers  Toilet Transfer : Contact guard assistance;Minimum assistance (used BS at bedside this am)  Tub Transfer: Maximum assistance  Shower Transfer: Minimum assistance (on wet surfaces)     Bed/Mat Mobility  Rolling: Supervision  Supine to Sit: Contact guard assistance  Sit to Supine: Contact guard assistance  Sit to Stand: Contact guard assistance  Bed to Chair: Contact guard assistance  Scooting: Contact guard assistance          Most Recent Physical Functioning:   Gross Assessment:  AROM: Within functional limits  PROM: Within functional limits  Strength: Generally decreased, functional  Coordination: Generally decreased, functional  Tone: Normal  Sensation: Intact               Posture:  Posture (WDL): Within defined limits  Balance:  Sitting: Impaired  Sitting - Static: Fair (occasional)  Sitting - Dynamic: Fair (occasional)  Standing: Impaired  Standing - Static: Fair  Standing - Dynamic : Fair Bed Mobility:  Rolling: Supervision  Supine to Sit: Contact guard assistance  Sit to Supine: Contact guard assistance  Scooting: Contact guard assistance  Wheelchair Mobility:     Transfers:  Sit to Stand: Contact guard assistance  Stand to Sit: Contact guard assistance  Bed to Chair: Contact guard assistance           Patient Vitals for the past 6 hrs:       BP BP Patient Position SpO2 Pulse   06/29/17 0723 - - 94 % -   06/29/17 0746 (!) 185/101 At rest 94 % 77   06/29/17 0753 160/86 At rest - -        Mental Status  Neurologic State: Alert  Orientation Level: Oriented to person, Oriented to place, Oriented to situation, Oriented to time (thought random comments)  Cognition: Impulsive, Follows commands, Impaired decision making  Perception: Appears intact  Perseveration: No perseveration noted  Safety/Judgement: Decreased awareness of environment, Decreased awareness of need for assistance, Decreased awareness of need for safety, Fall prevention                               Physical Skills Involved:  1. Range of Motion  2. Balance  3. Strength  4. Activity Tolerance Cognitive Skills Affected (resulting in the inability to perform in a timely and safe manner):  1. Perception  2. Executive Function  3. Immediate Memory  4. Short Term Recall  5.  Long Term Memory  6. Divided Attention  7. Comprehension Psychosocial Skills Affected:  1. Habits/Routines  2. Environmental Adaptation  3. Social Interaction  4. Emotional Regulation  5. Self-Awareness  6. Awareness of Others  7. Social Roles   Number of elements that affect the Plan of Care: 5+:  HIGH COMPLEXITY   CLINICAL DECISION MAKIN [facility] Mobility Inpatient Short Form  How much help from another person does the patient currently need. .. Total A Lot A Little None   1. Putting on and taking off regular lower body clothing?   [ ] 1   [ ] 2   [X] 3   [ ] 4   2. Bathing (including washing, rinsing, drying)? [ ] 1   [ ] 2   [X] 3   [ ] 4   3. Toileting, which includes using toilet, bedpan or urinal?   [ ] 1   [ ] 2   [X] 3   [ ] 4   4. Putting on and taking off regular upper body clothing?   [ ] 1   [ ] 2   [X] 3   [ ] 4   5. Taking care of personal grooming such as brushing teeth? [ ] 1   [ ] 2   [X] 3   [ ] 4   6. Eating meals? [ ] 1   [ ] 2   [ ] 3   [X] 4   © , Trustees of [address] [place] [address], under license to [employer]. All rights reserved    Score:  Initial: 19, completed 2017 Most Recent: X (Date: -- )     Interpretation of Tool:  Represents activities that are increasingly more difficult (i.e. Bed mobility, Transfers, Gait). Score 24 23 22-20 19-15 14-10 9-7 6       Modifier CH CI CJ CK CL CM CN         · Self Care:               - CURRENT STATUS:    CK - 40%-59% impaired, limited or restricted               - GOAL STATUS:           CI - 1%-19% impaired, limited or restricted               - D/C STATUS:                       ---------------To be determined---------------  [place] / [place] / Product Type: Medicare /       Medical Necessity:     · Patient demonstrates good rehab potential due to higher previous functional level.   Reason for Services/Other Comments:  · Patient continues to require skilled intervention due to s/p above and decreased ADLs and mobility. Use of outcome tool(s) and clinical judgement create a POC that gives a: MODERATE COMPLEXITY             TREATMENT:   (In addition to Assessment/Re-Assessment sessions the following treatments were rendered)      Pre-treatment Symptoms/Complaints:  \"I need to use the bathroom. \" agreeable to OT assisting  Pain: Initial:   Pain Intensity 1: 0 (no complaint of pain during OT session)  Post Session:  same      Self Care: (15 mins): Procedure(s) (per grid) utilized to improve and/or restore self-care/home management as related to dressing, bathing, toileting, grooming and self feeding. Required minimal visual, verbal, tactile and   cueing to facilitate activities of daily living skills and compensatory activities. Therapeutic Activity: (    10 mins): Therapeutic activities including Bed transfers, Chair transfers, Toilet transfers, Ambulation on level ground, Carry objects and safety training to improve mobility, strength, balance, coordination and activity tolerance for ADLs. Required minimal   to promote dynamic balance in standing. Evaluation: 20 mins     Braces/Orthotics/Lines/Etc:   · IV  · adult brief  · O2 Device: Room air  Treatment/Session Assessment:    · Response to Treatment:  Tolerated well, intermittent confusion, in bed restraints  · Interdisciplinary Collaboration:  · Occupational Therapist  · Registered Nurse  ·   · After treatment position/precautions:  · Supine in bed  · Bed alarm/tab alert on  · Bed/Chair-wheels locked  · Bed in low position  · Call light within reach  · RN notified  · Restraints  · Side rails x 3  · tray table at other bedrail with all needs in reach  · Compliance with Program/Exercises: Will assess as treatment progresses. Compliant today. · Recommendations/Intent for next treatment session:   \"Next visit will focus on advancements to more challenging activities and reduction in assistance provided\".   Total Treatment Duration: 45 mins  OT Patient Time In/Time Out  Time In: 0830  Time Out: [address], OT  Peggy Smart, MS, OTR/L

## 2017-06-29 NOTE — PROGRESS NOTES
Warfarin dosing per pharmacist    Deborah Galdamez is a 80 y.o. female. Height: 5' 3\" (160 cm)    Weight: 57.2 kg (126 lb)    Indication:  A. Fib, Hx AVR    Goal INR:  2-3 per anticoag notes    Home dose:  Warfarin 2 mg every evening on Tuesday & Saturday , Warfarin 4 mg every evening on Sunday, Monday, Wednesday, Thursday and Friday. Risk factors or significant drug interactions:  levothyroxine    Other anticoagulants:  Heparin drip     Daily Monitoring  Date  INR     Warfarin dose HGB              Notes  6/27  2.2 (6/26) 2 mg  13.1    6/28  1.5  4 mg  13.0  6/29  1.3  4mg  ---    Pharmacy consulted to manage warfarin on 6/27/17 by Dr. Trudy Velasco    80 YOF with a history of A. Fib on warfarin as an outpatient. INR down again. Will start a heparin drip while INR subtherapeutic per Dr. Demetrius Trivedi. Scheduled for higher dose of 4mg this evening. If INR not up by tomorrow, may need to increase dose. Following daily INR.       Thank you,  Che Lechuga, PharmD

## 2017-06-29 NOTE — PROGRESS NOTES
Patient resting in bed napping after being up in chair most of the day. Patient very alert today. IV place in right arm for second access. Heparin started per order. MD questioned about heparin gtt r/t hx of bleeding ulcers. MD stated we would do extra H&H for that reason. Call light within reach. Will continue to monitor.

## 2017-06-29 NOTE — PROGRESS NOTES
Problem: Mobility Impaired (Adult and Pediatric)  Goal: *Acute Goals and Plan of Care (Insert Text)  STG:  (1.)Ms. Debora Walls will move from supine to sit and sit to supine , scoot up and down and roll side to side with INDEPENDENT within 7 day(s). (2.)Ms. Debora Walls will transfer from bed to chair and chair to bed with Supervision using the least restrictive device within 7 day(s). (3.)Ms. Debora Walls will ambulate with SUPERVISION for 150 feet with the least restrictive device within 7 day(s). (4.)Ms Debora Walls will follow commands for BLE AROM exercises and gait/balance activities 75% time within 7 days       PHYSICAL THERAPY: INITIAL ASSESSMENT, TREATMENT DAY: DAY OF ASSESSMENT, PM 6/29/2017  INPATIENT: Hospital Day: 4  [place] / [place] / Product Type: Medicare /      NAME/AGE/GENDER: Nico Tolentino is a 80 y.o. female     PRIMARY DIAGNOSIS: Acute encephalopathy  Acute encephalopathy  Acute metabolic encephalopathy Acute encephalopathy Acute encephalopathy        ICD-10: Treatment Diagnosis:       · Generalized Muscle Weakness (M62.81)  · Difficulty in walking, Not elsewhere classified (R26.2)  · Other abnormalities of gait and mobility (R26.89)  · History of falling (Z91.81)   Precaution/Allergies:  Latex; Bee sting [sting, bee]; and Adhesive       ASSESSMENT:      Ms. Debora Walls is doing better today. She is clearer mentally, and she is requiring less assist for transfers and gait. Currently contact guard. Her gait distance has increased. However she is still unsafe, requiring verbal cues to pace as she goes too quickly and she is less aware of objects in her way and tries to just plow them over. She complains to me about diarrhea she states she has collitis and normally takes medicine for it. This was mentioned by this PT when Dr. Estefania Peguero entered the room. RN aware.       This section established at most recent assessment   PROBLEM LIST (Impairments causing functional limitations):  1. Decreased Strength  2. Decreased ADL/Functional Activities  3. Decreased Transfer Abilities  4. Decreased Ambulation Ability/Technique  5. Decreased Balance  6. Decreased Activity Tolerance  7. Increased Fatigue  8. Decreased Cognition    INTERVENTIONS PLANNED: (Benefits and precautions of physical therapy have been discussed with the patient.)  1. Bed Mobility  2. Gait Training  3. Therapeutic Activites  4. Therapeutic Exercise/Strengthening  5. Transfer Training      TREATMENT PLAN: Frequency/Duration: 3 times a week for duration of hospital stay  Rehabilitation Potential For Stated Goals: GOOD      RECOMMENDED REHABILITATION/EQUIPMENT: (at time of discharge pending progress): Continue Skilled Therapy and unknown at this time what she will need at discharge. HISTORY:   History of Present Injury/Illness (Reason for Referral):  Patient is a 80 yof with a hx of cad, afib on coumadin, ckd who presents from [facility] with acute encephalopathy and falls. Pt is unresponsive and unable to aid with hx. CT head on admission is negative for acute abnormalities. Pt has several sedative medications listed on her med rec. ABG is pending. Past Medical History/Comorbidities:   Ms. Del Mallory  has a past medical history of A-fib ([facility] [address] Acquired hypothyroidism; Acute CVA (cerebrovascular accident) ([facility] [address] (6/21/2014); Acute lacunar infarction [facility]); Anxiety state, unspecified; Aortic valve stenosis, congenital (2/19/2016); CAD (coronary artery disease); Candida Esophagitis (2/11/2010); Chronic kidney disease; Chronic pain; Depression; Dermatomyositis ([facility] [address] Duodenal ulcer; Dysphagia (2/19/2016); Embolism and thrombosis of unspecified artery ([facility] [address] (9/12/2014); Fibromyalgia; History of anemia (03/12/2012); History of drug overdose (09/2016); History of encephalopathy (02/04/2010); History of fracture of hip (03/10/2012); History of mastectomy;  Hypertension; Immunosuppressed status ([address] (6/20/2014); Long term (current) use of anticoagulants; Malignant tumor of colon ([facility] [address] Osteoarthritis; Other ill-defined cerebrovascular disease; Pain in joint, pelvic region and thigh; Personal history of fall; Postmenopausal atrophic vaginitis; Primary localized osteoarthrosis, pelvic region and thigh; PUD (peptic ulcer disease) (2009); PVD (peripheral vascular disease) ([facility] [address] Reflux esophagitis; S/P AVR (aortic valve replacement); Seasonal allergic rhinitis due to pollen; Sleep disturbance; Symptomatic menopausal or female climacteric states; Thromboembolus ([facility] [address] (2007); Unspecified disorder of liver; and Urticaria. Ms. Kaitlynn Calvo  has a past surgical history that includes hysterectomy (1975); tonsillectomy; other surgical; carpal tunnel release (Right, 1995); abdomen surgery proc unlisted (1990); colonoscopy (9/25/13); colonoscopy (2010); cataract removal (Right, 3/20/2014); tumor removal (1986); mastectomy (Bilateral, 2000); tonsil and adenoidectomy; aortic valve replacement (2004); aortic valve replacement; cholecystectomy; total hip arthroplasty (Right, 12/2012, 3/11/12, 2/2013); revise total hip replacement (Right, 1/17/13); and acl reconstruction (Left, 1979). Social History/Living Environment:   Home Environment: Independent living ([facility])  # Steps to Enter: 0  One/Two Story Residence: One story  Living Alone: Yes  Support Systems: Child(adan), Home care staff, Friends \ neighbors  Patient Expects to be Discharged to[de-identified] Assisted living  Current DME Used/Available at Home: Grab bars, Commode, bedside, Cane, straight, Shower chair, Walker, rollator, Other (comment) ([employer])  Tub or Shower Type: Tub/Shower combination  Prior Level of Function/Work/Activity:  Unknown with no family present to provide update of prior level of function.        Number of Personal Factors/Comorbidities that affect the Plan of Care: 3+: HIGH COMPLEXITY   EXAMINATION:   Most Recent Physical Functioning:   Gross Assessment: Posture:     Balance:  Sitting: Impaired  Sitting - Static: Good (unsupported)  Sitting - Dynamic: Fair (occasional)  Standing: Impaired; With support  Standing - Static: Fair  Standing - Dynamic : Fair Bed Mobility:  Rolling: Supervision  Supine to Sit: Supervision  Wheelchair Mobility:     Transfers:  Sit to Stand: Contact guard assistance  Stand to Sit: Contact guard assistance  Bed to Chair: Contact guard assistance  Gait:     Base of Support: Narrowed  Speed/Julia: Fluctuations; Accelerated (needs verbal cues to slow down.)  Step Length: Right shortened;Left shortened  Gait Abnormalities: Trunk sway increased;Decreased step clearance  Distance (ft): 100 Feet (ft) (x 2 with seated rest breaks and exercises in the chair between )  Ambulation - Level of Assistance: Minimal assistance  Interventions: Tactile cues; Safety awareness training;Manual cues; Verbal cues       Body Structures Involved:  1. Metabolic Body Functions Affected:  1. Movement Related  2. Metobolic/Endocrine Activities and Participation Affected:  1. Learning and Applying Knowledge  2. General Tasks and Demands  3. Communication  4. Self Care   Number of elements that affect the Plan of Care: 4+: HIGH COMPLEXITY   CLINICAL PRESENTATION:   Presentation: Evolving clinical presentation with changing clinical characteristics: MODERATE COMPLEXITY   CLINICAL DECISION MAKIN [facility] Mobility Inpatient Short Form  How much difficulty does the patient currently have. .. Unable A Lot A Little None   1. Turning over in bed (including adjusting bedclothes, sheets and blankets)? [ ] 1   [ ] 2   [ ] 3   [X] 4   2. Sitting down on and standing up from a chair with arms ( e.g., wheelchair, bedside commode, etc.)   [ ] 1   [ ] 2   [X] 3   [ ] 4   3. Moving from lying on back to sitting on the side of the bed? [ ] 1   [ ] 2   [X] 3   [ ] 4   How much help from another person does the patient currently need. ..  Total A Lot A Little None   4. Moving to and from a bed to a chair (including a wheelchair)? [ ] 1   [ ] 2   [X] 3   [ ] 4   5. Need to walk in hospital room? [ ] 1   [X] 2   [ ] 3   [ ] 4   6. Climbing 3-5 steps with a railing? [X] 1   [ ] 2   [ ] 3   [ ] 4   © 2007, Trustees of [address] [place] [address] [address] [address], under license to [employer]. All rights reserved    Score:  Initial: 16 Most Recent: X (Date: -- )     Interpretation of Tool:  Represents activities that are increasingly more difficult (i.e. Bed mobility, Transfers, Gait). Score 24 23 22-20 19-15 14-10 9-7 6       Modifier CH CI CJ CK CL CM CN         · Mobility - Walking and Moving Around:               - CURRENT STATUS:    CK - 40%-59% impaired, limited or restricted               - GOAL STATUS:           CI - 1%-19% impaired, limited or restricted               - D/C STATUS:                       ---------------To be determined---------------  [place] / [place] / Product Type: Medicare /       Medical Necessity:     · Patient is expected to demonstrate progress in strength, range of motion and functional technique to decrease assistance required with transfers and gait, increase independence with basic mobility skills and improve safety during sitting and standing activities. Reason for Services/Other Comments:  · Patient continues to require skilled intervention due to medical complications and patient unable to attend/participate in therapy as expected secondary to cognitive status. Use of outcome tool(s) and clinical judgement create a POC that gives a: Questionable prediction of patient's progress: MODERATE COMPLEXITY                 TREATMENT:   (In addition to Assessment/Re-Assessment sessions the following treatments were rendered)   Pre-treatment Symptoms/Complaints:  Was very anxious to get up out of bed.   Very motivated  Pain: Initial: 0/10  Pain Intensity 1: 0  Post Session:  0/10 Therapeutic Activity: (    20): Therapeutic activities including Bed transfers, Chair transfers and Ambulation on level ground to improve mobility. Required moderate Tactile cues; Safety awareness training;Manual cues; Verbal cues to promote motor control of bilateral, upper extremity(s), lower extremity(s). Seated Date:  6/29 Date:   Date:     Activity/Exercise Parameters Parameters Parameters   AP 20     LAQ 20     Marching 20     adductior squeezes 20                           Therapeutic Exercise: (  10):  Exercises per grid below to improve strength. Required minimal visual and verbal cues to promote proper body mechanics. Progressed repetitions as indicated. Braces/Orthotics/Lines/Etc:   · IV  · O2 Device: Room air  Treatment/Session Assessment:    · Response to Treatment:  Patient would respond periodically with an appropriate response to questions asked. She stated she wants to go home. · Interdisciplinary Collaboration:  · Physical Therapist  · Registered Nurse  · After treatment position/precautions:  · Up in chair, Bed alarm/tab alert on, Call light within reach and RN notified  · Compliance with Program/Exercises: Will assess as treatment progresses. · Recommendations/Intent for next treatment session: \"Next visit will focus on advancements to more challenging activities and reduction in assistance provided\".   Total Treatment Duration:PT Patient Time In/Time Out  Time In: 1010  Time Out: [address] Cruz PT

## 2017-06-29 NOTE — PROGRESS NOTES
Patient in bed resting with friend/family at bedside for support. Friend has been asked to call Bear Menendez (son) for d/c instructions. Patient still has periodic confusion at times but is getting better. Call light within reach.   Report to be given to oncoming RN 7p-7a

## 2017-06-29 NOTE — PROGRESS NOTES
Patient in bed resting with no complaints at this time. Patient is alert with periodic confusion but with no distress noted. IV intact and patent with no s/s of infection noted. Respirations even and unlabored with heart rate regular. Patient unable to ambulate independently without assistance; needs x1 r/t weakness and long hx of falls. Bed in low locked position with call light within reach. Patient instructed to call if assistance is needed. Will continue to monitor.

## 2017-06-29 NOTE — PROGRESS NOTES
Referral made to [address] [place] [address]. Pt discharged from [facility] on 5/15/17 with the recommendation to move from independent living to [facility]. Pt unfortunately returned to independent living. [facility] is agreeable to pt returning if she follows the recommendation to go into an custodial. Pt's son is working on an custodial. Pt will need three midnights as an inpatient before going to [facility]. SW following.

## 2017-06-30 LAB
ANION GAP BLD CALC-SCNC: 11 MMOL/L (ref 7–16)
BUN SERPL-MCNC: 7 MG/DL (ref 8–23)
CALCIUM SERPL-MCNC: 8.2 MG/DL (ref 8.3–10.4)
CHLORIDE SERPL-SCNC: 108 MMOL/L (ref 98–107)
CO2 SERPL-SCNC: 24 MMOL/L (ref 21–32)
CREAT SERPL-MCNC: 0.44 MG/DL (ref 0.6–1)
ERYTHROCYTE [DISTWIDTH] IN BLOOD BY AUTOMATED COUNT: 13.3 % (ref 11.9–14.6)
GLUCOSE SERPL-MCNC: 98 MG/DL (ref 65–100)
HCT VFR BLD AUTO: 42.1 % (ref 35.8–46.3)
HGB BLD-MCNC: 14.4 G/DL (ref 11.7–15.4)
INR PPP: 1.4 (ref 0.9–1.2)
MAGNESIUM SERPL-MCNC: 1.8 MG/DL (ref 1.8–2.4)
MCH RBC QN AUTO: 31.9 PG (ref 26.1–32.9)
MCHC RBC AUTO-ENTMCNC: 34.2 G/DL (ref 31.4–35)
MCV RBC AUTO: 93.3 FL (ref 79.6–97.8)
MM INDURATION POC: 0 MM (ref 0–5)
PHOSPHATE SERPL-MCNC: 2.7 MG/DL (ref 2.3–3.7)
PLATELET # BLD AUTO: 123 K/UL (ref 150–450)
PMV BLD AUTO: 9.7 FL (ref 10.8–14.1)
POTASSIUM SERPL-SCNC: 3 MMOL/L (ref 3.5–5.1)
PPD POC: NORMAL NEGATIVE
PROTHROMBIN TIME: 15.4 SEC (ref 9.6–12)
RBC # BLD AUTO: 4.51 M/UL (ref 4.05–5.25)
SODIUM SERPL-SCNC: 143 MMOL/L (ref 136–145)
WBC # BLD AUTO: 9.2 K/UL (ref 4.3–11.1)
WBC #/AREA STL HPF: NORMAL /HPF (ref 0–4)

## 2017-06-30 PROCEDURE — 87045 FECES CULTURE AEROBIC BACT: CPT | Performed by: HOSPITALIST

## 2017-06-30 PROCEDURE — 84100 ASSAY OF PHOSPHORUS: CPT | Performed by: HOSPITALIST

## 2017-06-30 PROCEDURE — 74011636637 HC RX REV CODE- 636/637: Performed by: INTERNAL MEDICINE

## 2017-06-30 PROCEDURE — 85027 COMPLETE CBC AUTOMATED: CPT | Performed by: INTERNAL MEDICINE

## 2017-06-30 PROCEDURE — 80048 BASIC METABOLIC PNL TOTAL CA: CPT | Performed by: INTERNAL MEDICINE

## 2017-06-30 PROCEDURE — 36415 COLL VENOUS BLD VENIPUNCTURE: CPT | Performed by: INTERNAL MEDICINE

## 2017-06-30 PROCEDURE — 65660000000 HC RM CCU STEPDOWN

## 2017-06-30 PROCEDURE — 89055 LEUKOCYTE ASSESSMENT FECAL: CPT | Performed by: HOSPITALIST

## 2017-06-30 PROCEDURE — 74011000258 HC RX REV CODE- 258: Performed by: HOSPITALIST

## 2017-06-30 PROCEDURE — 83735 ASSAY OF MAGNESIUM: CPT | Performed by: HOSPITALIST

## 2017-06-30 PROCEDURE — 74011250637 HC RX REV CODE- 250/637: Performed by: HOSPITALIST

## 2017-06-30 PROCEDURE — 74011250636 HC RX REV CODE- 250/636: Performed by: HOSPITALIST

## 2017-06-30 PROCEDURE — 74011250637 HC RX REV CODE- 250/637: Performed by: INTERNAL MEDICINE

## 2017-06-30 PROCEDURE — 85610 PROTHROMBIN TIME: CPT | Performed by: INTERNAL MEDICINE

## 2017-06-30 RX ORDER — TRAZODONE HYDROCHLORIDE 50 MG/1
50 TABLET ORAL
Status: DISCONTINUED | OUTPATIENT
Start: 2017-06-30 | End: 2017-07-03 | Stop reason: HOSPADM

## 2017-06-30 RX ORDER — VENLAFAXINE HYDROCHLORIDE 150 MG/1
150 CAPSULE, EXTENDED RELEASE ORAL
Status: DISCONTINUED | OUTPATIENT
Start: 2017-07-01 | End: 2017-07-03 | Stop reason: HOSPADM

## 2017-06-30 RX ADMIN — FOLIC ACID 1 MG: 1 TABLET ORAL at 10:08

## 2017-06-30 RX ADMIN — ENOXAPARIN SODIUM 40 MG: 40 INJECTION SUBCUTANEOUS at 15:26

## 2017-06-30 RX ADMIN — Medication 10 ML: at 06:17

## 2017-06-30 RX ADMIN — Medication 5 ML: at 15:26

## 2017-06-30 RX ADMIN — ACETAMINOPHEN 500 MG: 500 TABLET ORAL at 21:35

## 2017-06-30 RX ADMIN — CARVEDILOL 12.5 MG: 12.5 TABLET, FILM COATED ORAL at 17:05

## 2017-06-30 RX ADMIN — PANTOPRAZOLE SODIUM 40 MG: 40 TABLET, DELAYED RELEASE ORAL at 17:05

## 2017-06-30 RX ADMIN — SULFASALAZINE 1000 MG: 500 TABLET ORAL at 10:08

## 2017-06-30 RX ADMIN — LEVETIRACETAM 500 MG: 500 TABLET ORAL at 17:04

## 2017-06-30 RX ADMIN — ACETAMINOPHEN 500 MG: 500 TABLET ORAL at 10:11

## 2017-06-30 RX ADMIN — HYDRALAZINE HYDROCHLORIDE 10 MG: 20 INJECTION INTRAMUSCULAR; INTRAVENOUS at 04:18

## 2017-06-30 RX ADMIN — PREDNISONE 5 MG: 5 TABLET ORAL at 10:08

## 2017-06-30 RX ADMIN — ACETAMINOPHEN 500 MG: 500 TABLET ORAL at 03:03

## 2017-06-30 RX ADMIN — WARFARIN SODIUM 4 MG: 1 TABLET ORAL at 17:05

## 2017-06-30 RX ADMIN — TRAZODONE HYDROCHLORIDE 50 MG: 50 TABLET ORAL at 21:29

## 2017-06-30 RX ADMIN — LEVOTHYROXINE SODIUM 125 MCG: 125 TABLET ORAL at 06:16

## 2017-06-30 RX ADMIN — SULFASALAZINE 1000 MG: 500 TABLET ORAL at 17:05

## 2017-06-30 RX ADMIN — LEVETIRACETAM 500 MG: 500 TABLET ORAL at 10:08

## 2017-06-30 RX ADMIN — CARVEDILOL 12.5 MG: 12.5 TABLET, FILM COATED ORAL at 10:08

## 2017-06-30 RX ADMIN — PANTOPRAZOLE SODIUM 40 MG: 40 TABLET, DELAYED RELEASE ORAL at 06:16

## 2017-06-30 RX ADMIN — Medication 5 ML: at 21:30

## 2017-06-30 RX ADMIN — CEFTRIAXONE 1 G: 1 INJECTION, POWDER, FOR SOLUTION INTRAMUSCULAR; INTRAVENOUS at 10:14

## 2017-06-30 RX ADMIN — POTASSIUM & SODIUM PHOSPHATES POWDER PACK 280-160-250 MG 1 PACKET: 280-160-250 PACK at 10:08

## 2017-06-30 RX ADMIN — FERROUS SULFATE TAB 325 MG (65 MG ELEMENTAL FE) 325 MG: 325 (65 FE) TAB at 06:16

## 2017-06-30 NOTE — PROGRESS NOTES
Patient has been approved for short-term rehab at [facility] once she has been here for three midnights (as early as 7/1 if medically stable). [facility] requires that patient agree to [facility] instead of return to her [facility], [facility], as she is unsafe for independent living but returned there anyway following her previous rehab at [facility]. Patient agrees to move to an [facility]. Her son, Yvonne Esquivel, is working with [employer] [facility] [employer] on finding an appropriate prison for the patient at discharge from [facility]. Case Management will continue to follow. Care Management Interventions  Transition of Care Consult (CM Consult): Discharge Planning  Discharge Durable Medical Equipment: No  Physical Therapy Consult: Yes  Occupational Therapy Consult: Yes  Speech Therapy Consult: No  Current Support Network:  Other (Independent Living at [address] [place] [address] Road Follow Up Transport: Family  Plan discussed with Pt/Family/Caregiver: Yes  Freedom of Choice Offered: Yes  Discharge Location  Discharge Placement: Rehab Unit Subacute

## 2017-06-30 NOTE — PROGRESS NOTES
Tele reports patient with heart rate of 150s  Patient is up in gallardo walking with physical therapy   Patient returning to room

## 2017-06-30 NOTE — PROCEDURES
[facility]       Name:  Brianda Zendejas   MR#:  [id number]   :  1945   Account #:  [de-identified]   Date of Adm:  2017       HISTORY: A woman with history of stroke, shortness of breath,   altered mental status, hypertension, peptic ulcer disease, and a   seizure disorder. She also has major depressive disorder. ORDERING PHYSICIAN: Astrid Zeng MD.    MEDICATION: Keppra 500 mg b.i.d. Change and alteration of mental status. DESCRIPTION: A 21-channel EEG using 10/20 international electrode   placement with 1 channel of EKG and 20 channels of EEG. BACKGROUND ACTIVITY: Mostly through the record, there is small   amount of a wakeful tracing with the background of 8 Hz 50   microvolts. However, most of the tracing is asleep at stage 2   which is symmetric stage 2 sleep without any epileptogenic   activity. The EKG at times is actually hard to interpret with the   EKG amplitudes very low, at least 30 minutes are obtained. No   electrographic seizure occurs. Photic stimulation with driving effect and is normal.   Hyperventilation is deferred. INTERPRETATION: This is mostly a sleep tracing. No underlying   epileptogenic focus is notable. No underlying epileptogenic   activity or seizure. No localized focal abnormality. Noting the excessive amount of sleep, the possibility of   medication effect, etc., may be present. The EKG itself is   indefinite, although it might appear that it is in sinus rhythm,   the amplitudes are waveforms are not adequate to actually   interpret. IMPRESSION: Basically a normal 30-minute sleeping tracing. No   underlying epileptogenic activity is seen.          Elissa Lorenzo MD BT / Eddie Hallman   D:  2017   16:59   T:  2017   12:50   Job #:  [id number]

## 2017-06-30 NOTE — PROGRESS NOTES
Patient requested  visit. She shared some personal concerns including her son seeking a rehab facility for her to go to next. We had a discussion about her trent and her belief in God. She reported that she had lost a new hearing aid in the ER on Tuesday. I reported this to the  on unit 8. The patient also asked me to help her with her [employer] phone. She could not get it to work. The Unit secretary researched her device on the internet. It was discovered her device is only a wifi booster & not a phone. Support & prayer were extended to the patient.       [employer]

## 2017-06-30 NOTE — PROGRESS NOTES
Hospitalist Progress Note    2017  Admit Date: 2017  3:05 PM   NAME: Daniela Garza   :  1945   DOS:              17  MRN:  [id number]   Attending: Radha Alex MD  PCP:  Vandana Betancourt MD  Treatment Team: Attending Provider: Walker Tang MD; Utilization Review: Wilder Be RN; Care Manager: Tamica Martin. Jono Justin    Full Code     SUBJECTIVE:   As previously documented: \" Ms. Arias Nagel is a 81 yo F with a hx of cad, s/p AVR, pAFib on chronic  Coumadin, Hx of CVA, Hx of DVT, HTN,  Hypothyroidism, dermatomyositis with chronic steroid use with osteoporosis, Hx of Jason ulcers with Endo clip x2  On 2017,  who was admitted on 2017 from half-way with acute encephalopathy and falls. CT head on admission negative for acute abnormalities. Changed to inpatient status on 17. Extensive review of prior medical records done - noted recent hx of Jason ulcers, hx of seizures with last EEG on 3/28/17 that showed intermittent paroxysmal delta rhythms compatible with generalized epilepsy of primary generalized paroxysm - on on Keppra that was changed to Depakote due to recent hypotensive events and multiple falls, although is unclear if  still taking it. EEG and neurology consult ordered. \"         17   Ms. Jacki Dahlinical status is improving. Afebrile. Denies SOB, CP or abdominal pain. EEG w/o epilepsy. Has bed at [facility] tomorrow. 10+ ROS reviewed and negative except for positive in HPI.    Allergies   Allergen Reactions    Latex Anaphylaxis    Bee Sting [Sting, Bee] Shortness of Breath     anaphylatics    Adhesive Rash     Including Coban wrap     Current Facility-Administered Medications   Medication Dose Route Frequency Provider Last Rate Last Dose    traZODone (DESYREL) tablet 50 mg  50 mg Oral QHS Radha Alex MD   50 mg at 17    [START ON 2017] venlafaxine-SR (EFFEXOR-XR) capsule 150 mg  150 mg Oral DAILY WITH BREAKFAST Radha Alex MD        levETIRAcetam (KEPPRA) tablet 500 mg  500 mg Oral BID Thomas Ruano MD   500 mg at 06/30/17 1704    cefTRIAXone (ROCEPHIN) 1 g in 0.9% sodium chloride (MBP/ADV) 50 mL  1 g IntraVENous Q24H Thomas Ruano  mL/hr at 06/30/17 1014 1 g at 06/30/17 1014    dicyclomine (BENTYL) capsule 10 mg  10 mg Oral DAILY PRN Thomas Ruano MD   10 mg at 06/29/17 2042    sulfaSALAzine (AZULFIDINE) tablet 1,000 mg  1,000 mg Oral BID WITH MEALS Thomas Ruano MD   1,000 mg at 06/30/17 1705    hydrALAZINE (APRESOLINE) 20 mg/mL injection 10 mg  10 mg IntraVENous Q6H PRN Thomas Ruano MD   10 mg at 06/30/17 0418    carvedilol (COREG) tablet 12.5 mg  12.5 mg Oral BID WITH MEALS Thomas Ruano MD   12.5 mg at 06/30/17 1705    enoxaparin (LOVENOX) injection 40 mg  40 mg SubCUTAneous Q24H Thomas Ruano MD   40 mg at 06/30/17 1526    levothyroxine (SYNTHROID) tablet 125 mcg  125 mcg Oral ACB Thomas Ruano MD   125 mcg at 06/30/17 0616    pantoprazole (PROTONIX) tablet 40 mg  40 mg Oral ACB&D Thomas Ruano MD   40 mg at 06/30/17 1705    albuterol (PROVENTIL VENTOLIN) nebulizer solution 2.5 mg  2.5 mg Nebulization Q6H PRN Thomas Ruano MD        PPD Read Reminder  1 Each Other Q24H Tamar Serrato MD   1 Each at 06/30/17 1600    warfarin (COUMADIN) tablet 2 mg  2 mg Oral Once per day on Tue Sat Tamar Serrato MD   2 mg at 06/27/17 1609    And    warfarin (COUMADIN) tablet 4 mg  4 mg Oral Once per day on Sun Mon Wed Thu Fri Tamar Serrato MD   4 mg at 06/30/17 1705    acetaminophen (TYLENOL) tablet 500 mg  500 mg Oral Q6H PRN Tamar Serrato MD   500 mg at 06/30/17 1011    ferrous sulfate tablet 325 mg  1 Tab Oral ACB Tamar Serrato MD   325 mg at 56/72/53 4176    folic acid (FOLVITE) tablet 1 mg  1 mg Oral DAILY Tamar Serrato MD   1 mg at 06/30/17 1008    predniSONE (DELTASONE) tablet 5 mg  5 mg Oral DAILY WITH BREAKFAST Tamar Serrato MD   5 mg at 17 1008    sodium chloride (NS) flush 5-10 mL  5-10 mL IntraVENous Q8H Shana Burrell MD   5 mL at 17    budesonide (PULMICORT) 500 mcg/2 ml nebulizer suspension  500 mcg Nebulization BID RT Shana Burrell MD   500 mcg at 17         PHYSICAL EXAM     Visit Vitals    /76 (BP 1 Location: Right arm)    Pulse 80    Temp 98 °F (36.7 °C)    Resp 16    Ht 5' 3\" (1.6 m)    Wt 57.2 kg (126 lb)    SpO2 96%    BMI 22.32 kg/m2      Temp (24hrs), Av.1 °F (36.7 °C), Min:98 °F (36.7 °C), Max:98.3 °F (36.8 °C)    Oxygen Therapy  O2 Sat (%): 96 % (17)  Pulse via Oximetry: 74 beats per minute (17)  O2 Device: Room air (17)    Intake/Output Summary (Last 24 hours) at 17  Last data filed at 17 1726   Gross per 24 hour   Intake              454 ml   Output                0 ml   Net              454 ml        Physical Exam:  General:         Awake, alert. No acute distress. Afebrile. Cooperative. Looks chronically debilitated  HEENT:               NCAT. No obvious deformity. Nares normal. No drainage. Hematoma on mandibular castillo  Lungs:                  CTABL. No wheezing/rhonchi/rales  Cardiovascular:   RRR. No m/r/g. No pedal edema b/l. +2 PT/DT pulses b/l. Abdomen:       S/nt/nd. Bowel sounds normal. .   Skin:         Hematoma on mandibular area and right elbow. Not Jaundiced  Neurologic:    AAOX3. Lavada Saucer CN II- XII grossly WNL. No gross focal deficit. Heme/Lymph/Immune: No petechiae, echymoses, overt signs of bleeding or lymphadenopathy. Psychiatric:         Euthymic. DIAGNOSTIC STUDIES      Data Review:   Recent Results (from the past 24 hour(s))   CULTURE, STOOL    Collection Time: 17  3:45 AM   Result Value Ref Range    Special Requests: NO SPECIAL REQUESTS      Culture result:        NO GROWTH AFTER SHORT PERIOD OF INCUBATION. FURTHER RESULTS TO FOLLOW AFTER OVERNIGHT INCUBATION.    WBC, STOOL Collection Time: 06/30/17  3:45 AM   Result Value Ref Range    White blood cells, stool NO WBCS SEEN 0 - 4 /HPF   PROTHROMBIN TIME + INR    Collection Time: 06/30/17  6:45 AM   Result Value Ref Range    Prothrombin time 15.4 (H) 9.6 - 12.0 sec    INR 1.4 (H) 0.9 - 1.2     METABOLIC PANEL, BASIC    Collection Time: 06/30/17  6:45 AM   Result Value Ref Range    Sodium 143 136 - 145 mmol/L    Potassium 3.0 (L) 3.5 - 5.1 mmol/L    Chloride 108 (H) 98 - 107 mmol/L    CO2 24 21 - 32 mmol/L    Anion gap 11 7 - 16 mmol/L    Glucose 98 65 - 100 mg/dL    BUN 7 (L) 8 - 23 MG/DL    Creatinine 0.44 (L) 0.6 - 1.0 MG/DL    GFR est AA >60 >60 ml/min/1.73m2    GFR est non-AA >60 >60 ml/min/1.73m2    Calcium 8.2 (L) 8.3 - 10.4 MG/DL   CBC W/O DIFF    Collection Time: 06/30/17  6:45 AM   Result Value Ref Range    WBC 9.2 4.3 - 11.1 K/uL    RBC 4.51 4.05 - 5.25 M/uL    HGB 14.4 11.7 - 15.4 g/dL    HCT 42.1 35.8 - 46.3 %    MCV 93.3 79.6 - 97.8 FL    MCH 31.9 26.1 - 32.9 PG    MCHC 34.2 31.4 - 35.0 g/dL    RDW 13.3 11.9 - 14.6 %    PLATELET 621 (L) 330 - 450 K/uL    MPV 9.7 (L) 10.8 - 14.1 FL   PHOSPHORUS    Collection Time: 06/30/17 10:35 AM   Result Value Ref Range    Phosphorus 2.7 2.3 - 3.7 MG/DL   MAGNESIUM    Collection Time: 06/30/17 10:35 AM   Result Value Ref Range    Magnesium 1.8 1.8 - 2.4 mg/dL   PLEASE READ & DOCUMENT PPD TEST IN 48 HRS    Collection Time: 06/30/17  4:00 PM   Result Value Ref Range    PPD  Negative    mm Induration 0 mm       All Micro Results     Procedure Component Value Units Date/Time    CULTURE, STOOL [[id number]] Collected:  06/30/17 0345    Order Status:  Completed Specimen:  Stool Updated:  06/30/17 1112     Special Requests: NO SPECIAL REQUESTS        Culture result:         NO GROWTH AFTER SHORT PERIOD OF INCUBATION. FURTHER RESULTS TO FOLLOW AFTER OVERNIGHT INCUBATION.     CULTURE, URINE [[id number]]  (Abnormal) Collected:  06/28/17 0335    Order Status:  Completed Specimen:  Urine from Clean catch Updated:  06/30/17 0748     Special Requests: NO SPECIAL REQUESTS        Culture result:       >100,000  COLONIES/mL  KLEBSIELLA PNEUMONIAE  REPEATING SUSCEPTIBILITY   (A)            <10,000  COLONIES/mL  NORMAL SKIN JESSICA ISOLATED      C. DIFFICILE/EPI PCR [[id number]] Collected:  06/29/17 1045    Order Status:  Canceled Specimen:  Stool     MRSA SCREEN - PCR (NASAL) [[id number]] Collected:  06/26/17 2235    Order Status:  Completed Specimen:  Nasal from Nares Updated:  06/27/17 0021     Special Requests: NO SPECIAL REQUESTS        Culture result:         MRSA target DNA is not detected (presumptive not colonized with MRSA)          Imaging /Procedures /Studies:      CT Results  (Last 48 hours)               06/26/17 1657  CT HEAD WO CONT Final result    Impression:  IMPRESSION:       1. No acute intracranial findings. 2. Moderate cerebral volume loss. 3. Chronic left maxillary sinus disease. Narrative:  HEAD CT WITHOUT CONTRAST  6/26/2017        HISTORY:   AMS  ; history of head trauma. TECHNIQUE: Noncontrast axial images were obtained through the brain. All CT   scans at this facility used dose modulation, interactive reconstruction and/or   weight based dosing when appropriate to reduce radiation dose to as low as   reasonably achievable. COMPARISON: November 9, 2016       FINDINGS: There is no acute intracranial hemorrhage, significant mass effect or   CT evidence of acute large artery territorial infarction. Please note that a   hyperacute infarct or small vessel infarct may not be apparent on initial CT   imaging. Moderate cerebral volume loss is present. A few areas of low attenuation are   present in the supratentorial white matter, suggestive of chronic small vessel   ischemic disease. There is no hydrocephalus , intra-axial mass or abnormal extra-axial fluid   collection. There are no displaced skull fractures. The left maxillary sinus is   partially opacified. 06/26/17 1657  CT SPINE CERV WO CONT Final result    Impression:  IMPRESSION:       1. No acute findings in the cervical spine. 2. Multilevel facet degenerative change and degenerative disc disease with   foraminal stenosis present as described. Narrative:  CT CERVICAL SPINE WITHOUT CONTRAST 6/26/2017       HISTORY: Trauma. Altered mental status. TECHNIQUE: Noncontrast axial images were obtained from the craniocervical   junction through T3. Multiplanar reformatted images were generated. All CT   scans at this facility used dose modulation, iterative reconstruction and/or   weight based dosing when appropriate to reduce radiation dose to as low as   reasonably achievable. COMPARISON: November 9, 2016       FINDINGS: The cervical vertebrae are normal in height and alignment. The   articular facets overlap appropriately. Multilevel facet degenerative change is   present. Degenerative disc disease is present at C3-C4, C4-C5 and C5-C6 with   foraminal stenosis at these levels. There is no prevertebral soft tissue   swelling. Axial images demonstrate no displaced cervical spine fracture. Included portions   of the lung apices are clear. EEG 3/28/17: Technique: This EEG was performed using the [employer] 10/20 System. An EKG was monitored. The length of the recording was 30 minutes. State of Consciousness: awake, drowsy, asleep and awake,drowsy and asleep . Stage II. Description:     10 / 20 IEP 21 channel [employer] equipment bipolar / referential recordings for 30 + minutes using standard montages and technique. Background:  Normal.       8.5 hx 50 uV symmetric. Rhythms:  Normal. Good  Except an intermittent 2.5 Hz high amplitude  To 3 Hz delta activity lasting from 3 to 5 seconds during drowse. Epileptiform:  None. Symmetry :  Normal.     Alpha:  8 hz = normal amount. Normal attenuation with eye opening.      Beta:   13 + Hz and good amplitudes :  Normal amount. Normal symmetry. Theta:   5-7 Hz:  Normal amounts. Delta:   2 - 3 hz:   Normal amounts. Is paroxysm rhythm delta rhythm as noted. EK NSR     Activation Procedures:  Hyperventlation: *none/age   Photic Stimulation: done    Interpretation: This is an abnormal EEG due to the presence of intermittent paroxysmal delta rhythms compatible with generalized epilepsy or primary generalzied paroxysms. Correlation and recommendations[de-identified]   Medication for Anti epileptic therapy is recommended such as divalproex treatment. Labs and Studies from previous 24 hours have been personally reviewed by myself [place] [facility]    Diagnosis Date Noted    UTI (urinary tract infection) 2017    Hypokalemia 2017    Hypomagnesemia 2017    Acute metabolic encephalopathy     Seizure disorder ([facility] 2016    Acute encephalopathy 2016    Long term (current) use of anticoagulants     Immunosuppressed status ([facility] 2014    GERD (gastroesophageal reflux disease) 2013    PUD (peptic ulcer disease) 2013    HTN (hypertension)--Dr. Eleni Galindo 2013    S/P AVR (aortic valve replacement) 2010       Plan:  -  Mental status improving,  - seen by teleneurology - recommended to continue Keppra only. Repeat EEG w/o seizures  - INR:1.4 - per cardiology notes INR goal of 2.0-3.0 - high risk of bleeding higher due to recent hx of Jason ulcers  - coumadin management per phramacy   - on Protonix PO BID for recent hx of Jason ulcers  - levothyroxine increased - TSH:10.80 - will need repeat TSH in 2-3 weeks. -  Vitamin B1:2510, RPR : nonreactive.   - urine culture with Klebsiella - sensitivity pending  - continue IV Rocephin.   - increased Coreg - PRN  hydralazine 10 mg IV PRN if BP >160/90 mmHg  - BM improved after restarted on home sulfasalazine  -  R elbow CT w/o fracture  - PT/OT/PPD - will need long term placement? DVT Prophylaxis: Lovenox  CODE Status: Full CODE   Plan of Care Discussed with: patient.  Care team  Medical Risk: moderate  Disposition: has bed at [facility] tomorrow          Indira Maier MD  06/30/17

## 2017-06-30 NOTE — PROGRESS NOTES
Alert oriented  Says woke up confused this morning  Remote tele     Bruising noted to chin and legs  Denies pain at this time  Oxygen at 2l via cannula

## 2017-06-30 NOTE — PROGRESS NOTES
Warfarin dosing per pharmacist    Catrina Ovalles is a 80 y.o. female. Height: 5' 3\" (160 cm)    Weight: 57.2 kg (126 lb)    Indication:  A. Fib, Hx AVR    Goal INR:  2-3 per anticoag notes    Home dose:  Warfarin 2 mg every evening on Tuesday & Saturday , Warfarin 4 mg every evening on Sunday, Monday, Wednesday, Thursday and Friday. Risk factors or significant drug interactions:  levothyroxine    Other anticoagulants:  Heparin drip     Daily Monitoring  Date  INR     Warfarin dose HGB              Notes  6/27  2.2 (6/26) 2 mg  13.1    6/28  1.5  4 mg  13.0  6/29  1.3  4mg  ---  6/30  1.4  4 mg  14.4    Pharmacy consulted to manage warfarin on 6/27/17 by Dr. Nolan Street    80 YOF with a history of A. Fib on warfarin as an outpatient. INR rising. On heparin drip while INR subtherapeutic per Dr. Mary Nash. Scheduled for higher dose of 4mg this evening. If INR not up by tomorrow, may need to increase dose. Following daily INR.       Thank you,  Cammie Blunt, PharmD, BCPS  Clinical Pharmacist  [phone]

## 2017-07-01 LAB
ANION GAP BLD CALC-SCNC: 10 MMOL/L (ref 7–16)
BACTERIA SPEC CULT: ABNORMAL
BACTERIA SPEC CULT: ABNORMAL
BUN SERPL-MCNC: 10 MG/DL (ref 8–23)
CALCIUM SERPL-MCNC: 9 MG/DL (ref 8.3–10.4)
CHLORIDE SERPL-SCNC: 109 MMOL/L (ref 98–107)
CO2 SERPL-SCNC: 27 MMOL/L (ref 21–32)
CREAT SERPL-MCNC: 0.52 MG/DL (ref 0.6–1)
ERYTHROCYTE [DISTWIDTH] IN BLOOD BY AUTOMATED COUNT: 13.8 % (ref 11.9–14.6)
GLUCOSE SERPL-MCNC: 89 MG/DL (ref 65–100)
HCT VFR BLD AUTO: 41.4 % (ref 35.8–46.3)
HGB BLD-MCNC: 14 G/DL (ref 11.7–15.4)
INR PPP: 2.5 (ref 0.9–1.2)
MAGNESIUM SERPL-MCNC: 1.6 MG/DL (ref 1.8–2.4)
MCH RBC QN AUTO: 31.3 PG (ref 26.1–32.9)
MCHC RBC AUTO-ENTMCNC: 33.8 G/DL (ref 31.4–35)
MCV RBC AUTO: 92.6 FL (ref 79.6–97.8)
MM INDURATION POC: NORMAL MM (ref 0–5)
PLATELET # BLD AUTO: 127 K/UL (ref 150–450)
PMV BLD AUTO: 9.9 FL (ref 10.8–14.1)
POTASSIUM SERPL-SCNC: 2.8 MMOL/L (ref 3.5–5.1)
PPD POC: NORMAL NEGATIVE
PROTHROMBIN TIME: 27.9 SEC (ref 9.6–12)
RBC # BLD AUTO: 4.47 M/UL (ref 4.05–5.25)
SERVICE CMNT-IMP: ABNORMAL
SODIUM SERPL-SCNC: 146 MMOL/L (ref 136–145)
WBC # BLD AUTO: 7.5 K/UL (ref 4.3–11.1)

## 2017-07-01 PROCEDURE — 74011000258 HC RX REV CODE- 258: Performed by: HOSPITALIST

## 2017-07-01 PROCEDURE — 36415 COLL VENOUS BLD VENIPUNCTURE: CPT | Performed by: INTERNAL MEDICINE

## 2017-07-01 PROCEDURE — 74011250636 HC RX REV CODE- 250/636: Performed by: HOSPITALIST

## 2017-07-01 PROCEDURE — 74011250637 HC RX REV CODE- 250/637: Performed by: HOSPITALIST

## 2017-07-01 PROCEDURE — 74011250637 HC RX REV CODE- 250/637: Performed by: INTERNAL MEDICINE

## 2017-07-01 PROCEDURE — 65660000000 HC RM CCU STEPDOWN

## 2017-07-01 PROCEDURE — 85610 PROTHROMBIN TIME: CPT | Performed by: INTERNAL MEDICINE

## 2017-07-01 PROCEDURE — 80048 BASIC METABOLIC PNL TOTAL CA: CPT | Performed by: INTERNAL MEDICINE

## 2017-07-01 PROCEDURE — 74011636637 HC RX REV CODE- 636/637: Performed by: INTERNAL MEDICINE

## 2017-07-01 PROCEDURE — 83735 ASSAY OF MAGNESIUM: CPT | Performed by: HOSPITALIST

## 2017-07-01 PROCEDURE — 85027 COMPLETE CBC AUTOMATED: CPT | Performed by: INTERNAL MEDICINE

## 2017-07-01 RX ORDER — POTASSIUM CHLORIDE 14.9 MG/ML
20 INJECTION INTRAVENOUS
Status: COMPLETED | OUTPATIENT
Start: 2017-07-01 | End: 2017-07-01

## 2017-07-01 RX ORDER — UREA 10 %
2 LOTION (ML) TOPICAL 2 TIMES DAILY
Status: DISCONTINUED | OUTPATIENT
Start: 2017-07-01 | End: 2017-07-03 | Stop reason: HOSPADM

## 2017-07-01 RX ORDER — POTASSIUM CHLORIDE 20 MEQ/1
40 TABLET, EXTENDED RELEASE ORAL DAILY
Status: DISCONTINUED | OUTPATIENT
Start: 2017-07-01 | End: 2017-07-02

## 2017-07-01 RX ORDER — LORAZEPAM 0.5 MG/1
0.5 TABLET ORAL
Status: DISCONTINUED | OUTPATIENT
Start: 2017-07-01 | End: 2017-07-03 | Stop reason: HOSPADM

## 2017-07-01 RX ORDER — LOPERAMIDE HYDROCHLORIDE 2 MG/1
2 CAPSULE ORAL
Status: DISCONTINUED | OUTPATIENT
Start: 2017-07-01 | End: 2017-07-03

## 2017-07-01 RX ORDER — MAGNESIUM SULFATE HEPTAHYDRATE 40 MG/ML
2 INJECTION, SOLUTION INTRAVENOUS ONCE
Status: COMPLETED | OUTPATIENT
Start: 2017-07-01 | End: 2017-07-01

## 2017-07-01 RX ORDER — LANOLIN ALCOHOL/MO/W.PET/CERES
400 CREAM (GRAM) TOPICAL DAILY
Status: DISCONTINUED | OUTPATIENT
Start: 2017-07-01 | End: 2017-07-03 | Stop reason: HOSPADM

## 2017-07-01 RX ORDER — SODIUM CHLORIDE 450 MG/100ML
75 INJECTION, SOLUTION INTRAVENOUS CONTINUOUS
Status: DISCONTINUED | OUTPATIENT
Start: 2017-07-01 | End: 2017-07-02

## 2017-07-01 RX ORDER — WARFARIN 1 MG/1
1 TABLET ORAL EVERY EVENING
Status: DISCONTINUED | OUTPATIENT
Start: 2017-07-01 | End: 2017-07-03

## 2017-07-01 RX ADMIN — POTASSIUM CHLORIDE 40 MEQ: 20 TABLET, EXTENDED RELEASE ORAL at 10:16

## 2017-07-01 RX ADMIN — POTASSIUM CHLORIDE 20 MEQ: 200 INJECTION, SOLUTION INTRAVENOUS at 14:30

## 2017-07-01 RX ADMIN — FOLIC ACID 1 MG: 1 TABLET ORAL at 08:21

## 2017-07-01 RX ADMIN — Medication 10 ML: at 21:41

## 2017-07-01 RX ADMIN — WARFARIN SODIUM 1 MG: 1 TABLET ORAL at 16:17

## 2017-07-01 RX ADMIN — PANTOPRAZOLE SODIUM 40 MG: 40 TABLET, DELAYED RELEASE ORAL at 06:09

## 2017-07-01 RX ADMIN — LACTOBACILLUS TAB 2 TABLET: TAB at 16:18

## 2017-07-01 RX ADMIN — POTASSIUM CHLORIDE 20 MEQ: 200 INJECTION, SOLUTION INTRAVENOUS at 10:16

## 2017-07-01 RX ADMIN — PANTOPRAZOLE SODIUM 40 MG: 40 TABLET, DELAYED RELEASE ORAL at 16:18

## 2017-07-01 RX ADMIN — HYDRALAZINE HYDROCHLORIDE 10 MG: 20 INJECTION INTRAMUSCULAR; INTRAVENOUS at 03:15

## 2017-07-01 RX ADMIN — SULFASALAZINE 1000 MG: 500 TABLET ORAL at 16:18

## 2017-07-01 RX ADMIN — SULFASALAZINE 1000 MG: 500 TABLET ORAL at 08:21

## 2017-07-01 RX ADMIN — SODIUM CHLORIDE 75 ML/HR: 450 INJECTION, SOLUTION INTRAVENOUS at 10:00

## 2017-07-01 RX ADMIN — PREDNISONE 5 MG: 5 TABLET ORAL at 08:21

## 2017-07-01 RX ADMIN — ACETAMINOPHEN 500 MG: 500 TABLET ORAL at 22:19

## 2017-07-01 RX ADMIN — CEFTRIAXONE 1 G: 1 INJECTION, POWDER, FOR SOLUTION INTRAMUSCULAR; INTRAVENOUS at 12:45

## 2017-07-01 RX ADMIN — Medication 5 ML: at 14:00

## 2017-07-01 RX ADMIN — Medication 5 ML: at 06:08

## 2017-07-01 RX ADMIN — TRAZODONE HYDROCHLORIDE 50 MG: 50 TABLET ORAL at 21:41

## 2017-07-01 RX ADMIN — LEVOTHYROXINE SODIUM 125 MCG: 125 TABLET ORAL at 06:09

## 2017-07-01 RX ADMIN — CARVEDILOL 12.5 MG: 12.5 TABLET, FILM COATED ORAL at 16:18

## 2017-07-01 RX ADMIN — FERROUS SULFATE TAB 325 MG (65 MG ELEMENTAL FE) 325 MG: 325 (65 FE) TAB at 06:09

## 2017-07-01 RX ADMIN — Medication 400 MG: at 14:27

## 2017-07-01 RX ADMIN — CARVEDILOL 12.5 MG: 12.5 TABLET, FILM COATED ORAL at 08:21

## 2017-07-01 RX ADMIN — LORAZEPAM 0.5 MG: 0.5 TABLET ORAL at 04:18

## 2017-07-01 RX ADMIN — LEVETIRACETAM 500 MG: 500 TABLET ORAL at 16:18

## 2017-07-01 RX ADMIN — LEVETIRACETAM 500 MG: 500 TABLET ORAL at 08:21

## 2017-07-01 RX ADMIN — VENLAFAXINE HYDROCHLORIDE 150 MG: 150 CAPSULE, EXTENDED RELEASE ORAL at 08:21

## 2017-07-01 RX ADMIN — LACTOBACILLUS TAB 2 TABLET: TAB at 12:30

## 2017-07-01 RX ADMIN — MAGNESIUM SULFATE HEPTAHYDRATE 2 G: 40 INJECTION, SOLUTION INTRAVENOUS at 16:17

## 2017-07-01 RX ADMIN — LOPERAMIDE HYDROCHLORIDE 2 MG: 2 CAPSULE ORAL at 14:27

## 2017-07-01 NOTE — PROGRESS NOTES
Warfarin dosing per pharmacist    Jimena Kaplan is a 80 y.o. female. Height: 5' 3\" (160 cm)    Weight: 57.2 kg (126 lb)    Indication:  A. Fib, Hx AVR    Goal INR:  2-3 per anticoag notes    Home dose:  2 mg Tues, Sat; 4 mg all other days    Risk factors or significant drug interactions:  levothyroxine    Other anticoagulants:  none    Daily Monitoring  Date  INR     Warfarin dose HGB              Notes  6/27  2.2 (6/26) 2 mg  13.1    6/28  1.5  4 mg  13.0  6/29  1.3  4 mg  ---  6/30  1.4  4 mg  14.4  7/1  2.5  1 mg  14.0    Pharmacy consulted to manage warfarin on 6/27/17 by Dr. Lord Hewitt    80 YOF with a history of A. Fib on warfarin as an outpatient. INR jumped more than a point to 2.5. Will give 1 mg tonight and follow daily INR. Pharmacy will continue to follow. Please call with any questions.      Thank you,  Delaney Ocampo, PharmD  Clinical Pharmacist  [phone]

## 2017-07-01 NOTE — PROGRESS NOTES
Patient in bed resting with no complaints at this time. Patient is alert and orientated with no distress noted. IV intact and patent with no s/s of infection noted. Respirations even and unlabored with heart rate regular. Patient on remote tele running NSR. Patient unable to ambulate independently without assistance; needs x1 r/t weakness and long hx of fall. Call light within reach. Patient instructed to call if assistance is needed. Will continue to monitor.

## 2017-07-01 NOTE — PROGRESS NOTES
Patient sitting up in chair per request.  Patient a/o and more stable today. Call light within reach. Will continue to monitor.

## 2017-07-01 NOTE — PROGRESS NOTES
Patient stable, resting in bed with no complaints at this time. IV infusing 1/2 NS at 75 per order. Call light within reach.   Report to be given to oncoming RN 7p-7a

## 2017-07-01 NOTE — PROGRESS NOTES
Pt requesting \"something for anxiety\", Dr. Jazmine Ferrari notified, received orders for 0.5mg Ativan Q6H PRN. See MAR.

## 2017-07-01 NOTE — PROGRESS NOTES
Pt resting quietly in bed, HOB elevated, lap belt in place. Pt A&Ox4 on RA. Lung sounds diminished, S1/S2 audible, peripheral pulses palpable, extremities warm. Pt reporting 8/10 pain in lower back, requesting tylenol with night time meds. Call light in place, pt instructed to call for assistance as needed.

## 2017-07-01 NOTE — PROGRESS NOTES
Hospitalist Progress Note    2017  Admit Date: 2017  3:05 PM   NAME: Suresh Cordero   :  1945   DOS:              17  MRN:  [id number]   Attending: Sasha Fernandez MD  PCP:  Flynn Tyson MD  Treatment Team: Attending Provider: Emir Huff MD; Utilization Review: Alyce Snyder RN; Care Manager: Ventura Osgood. Teressa Frost    Full Code     SUBJECTIVE:   As previously documented: \" Ms. Manfred Lawton is a 79 yo F with a hx of cad, s/p AVR, pAFib on chronic  Coumadin, Hx of CVA, Hx of DVT, HTN,  Hypothyroidism, dermatomyositis with chronic steroid use with osteoporosis, Hx of Jason ulcers with Endo clip x2  On 2017,  who was admitted on 2017 from senior living with acute encephalopathy and falls. CT head on admission negative for acute abnormalities. Changed to inpatient status on 17. Extensive review of prior medical records done - noted recent hx of Jason ulcers, hx of seizures with last EEG on 3/28/17 that showed intermittent paroxysmal delta rhythms compatible with generalized epilepsy of primary generalized paroxysm - on on Keppra that was changed to Depakote due to recent hypotensive events and multiple falls, although is unclear if  still taking it. EEG w/o seizure. Neurology recommended to continue [address]. \"         17   Ms. Suresh Cordero complains of diarrhea - improving, although K:2.8. Afebrile. Denies SOB, CP or abdominal pain. INR:2.5. clinical status is improving. Afebrile. Denies SOB, CP or abdominal pain. Has bed at [facility] tomorrow. 10+ ROS reviewed and negative except for positive in HPI.    Allergies   Allergen Reactions    Latex Anaphylaxis    Bee Sting [Sting, Bee] Shortness of Breath     anaphylatics    Adhesive Rash     Including Coban wrap     Current Facility-Administered Medications   Medication Dose Route Frequency Provider Last Rate Last Dose    LORazepam (ATIVAN) tablet 0.5 mg  0.5 mg Oral Q6H PRN Ian Karimi MD   0.5 mg at 17 4701  potassium chloride (K-DUR, KLOR-CON) SR tablet 40 mEq  40 mEq Oral DAILY Donald Hoang MD   40 mEq at 07/01/17 1016    0.45% sodium chloride infusion  75 mL/hr IntraVENous CONTINUOUS Donald Hoang MD 75 mL/hr at 07/01/17 1000 75 mL/hr at 07/01/17 1000    warfarin (COUMADIN) tablet 1 mg - pharmacy dosing  1 mg Oral QPM Isrrael Garcia MD   1 mg at 07/01/17 1617    magnesium oxide (MAG-OX) tablet 400 mg  400 mg Oral DAILY Donald Hoang MD   400 mg at 07/01/17 1427    Lactobacillus Acidoph & Bulgar [facility]) tablet 2 Tab  2 Tab Oral BID Donald Hoang MD   2 Tab at 07/01/17 1618    loperamide (IMODIUM) capsule 2 mg  2 mg Oral Q4H PRN Donald Hoang MD   2 mg at 07/01/17 1427    traZODone (DESYREL) tablet 50 mg  50 mg Oral QHS Donald Hoang MD   50 mg at 06/30/17 2129    venlafaxine-SR (EFFEXOR-XR) capsule 150 mg  150 mg Oral DAILY WITH BREAKFAST Donald Hoang MD   150 mg at 07/01/17 0821    levETIRAcetam (KEPPRA) tablet 500 mg  500 mg Oral BID Donald Hoang MD   500 mg at 07/01/17 1618    cefTRIAXone (ROCEPHIN) 1 g in 0.9% sodium chloride (MBP/ADV) 50 mL  1 g IntraVENous Q24H Donald Hoang  mL/hr at 07/01/17 1245 1 g at 07/01/17 1245    dicyclomine (BENTYL) capsule 10 mg  10 mg Oral DAILY PRN Donald Hoang MD   10 mg at 06/29/17 2042    sulfaSALAzine (AZULFIDINE) tablet 1,000 mg  1,000 mg Oral BID WITH MEALS Donald Hoang MD   1,000 mg at 07/01/17 1618    hydrALAZINE (APRESOLINE) 20 mg/mL injection 10 mg  10 mg IntraVENous Q6H PRN Donald Hoang MD   10 mg at 07/01/17 0315    carvedilol (COREG) tablet 12.5 mg  12.5 mg Oral BID WITH MEALS Donald Hoang MD   12.5 mg at 07/01/17 1618    levothyroxine (SYNTHROID) tablet 125 mcg  125 mcg Oral JOVITA Hoang MD   125 mcg at 07/01/17 0609    pantoprazole (PROTONIX) tablet 40 mg  40 mg Oral ACB&D Donald Hoang MD   40 mg at 07/01/17 1618    albuterol (PROVENTIL VENTOLIN) nebulizer solution 2.5 mg  2.5 mg Nebulization Q6H PRN Denyce Kawasaki, MD        acetaminophen (TYLENOL) tablet 500 mg  500 mg Oral Q6H PRN Lia Rothman MD   500 mg at 17 2135    ferrous sulfate tablet 325 mg  1 Tab Oral ACB Lia Rothman MD   325 mg at 48 7672    folic acid (FOLVITE) tablet 1 mg  1 mg Oral DAILY Lia Rothman MD   1 mg at 17 0929    predniSONE (DELTASONE) tablet 5 mg  5 mg Oral DAILY WITH BREAKFAST Lia Rothman MD   5 mg at 17 2626    sodium chloride (NS) flush 5-10 mL  5-10 mL IntraVENous Q8H Lia Rothman MD   5 mL at 17 1400    budesonide (PULMICORT) 500 mcg/2 ml nebulizer suspension  500 mcg Nebulization BID RT Lia Rothman MD   500 mcg at 17         PHYSICAL EXAM     Visit Vitals    /72 (BP Patient Position: At rest)    Pulse 91    Temp 97.4 °F (36.3 °C)    Resp 16    Ht 5' 3\" (1.6 m)    Wt 57.2 kg (126 lb)    SpO2 93%    BMI 22.32 kg/m2      Temp (24hrs), Av.9 °F (36.6 °C), Min:97.3 °F (36.3 °C), Max:98.7 °F (37.1 °C)    Oxygen Therapy  O2 Sat (%): 93 % (17)  Pulse via Oximetry: 74 beats per minute (17)  O2 Device: Room air (17 0303)    Intake/Output Summary (Last 24 hours) at 17  Last data filed at 17 1724   Gross per 24 hour   Intake              396 ml   Output                0 ml   Net              396 ml        Physical Exam:  General:         Awake, alert. No acute distress. Afebrile. Cooperative. Looks chronically debilitated  HEENT:               NCAT. No obvious deformity. Nares normal. No drainage. Hematoma on mandibular castillo  Lungs:                  CTABL. No wheezing/rhonchi/rales  Cardiovascular:   RRR. No m/r/g. No pedal edema b/l. +2 PT/DT pulses b/l. Abdomen:       S/nt/nd. Bowel sounds normal. .   Skin:         Hematoma on mandibular area and right elbow. Not Jaundiced  Neurologic:    AAOX3. Garfield Rutherford CN II- XII grossly WNL. No gross focal deficit. Heme/Lymph/Immune: No petechiae, echymoses, overt signs of bleeding or lymphadenopathy. Psychiatric:         Euthymic.            DIAGNOSTIC STUDIES      Data Review:   Recent Results (from the past 24 hour(s))   PROTHROMBIN TIME + INR    Collection Time: 07/01/17  7:14 AM   Result Value Ref Range    Prothrombin time 27.9 (H) 9.6 - 12.0 sec    INR 2.5 (H) 0.9 - 1.2     CBC W/O DIFF    Collection Time: 07/01/17  7:14 AM   Result Value Ref Range    WBC 7.5 4.3 - 11.1 K/uL    RBC 4.47 4.05 - 5.25 M/uL    HGB 14.0 11.7 - 15.4 g/dL    HCT 41.4 35.8 - 46.3 %    MCV 92.6 79.6 - 97.8 FL    MCH 31.3 26.1 - 32.9 PG    MCHC 33.8 31.4 - 35.0 g/dL    RDW 13.8 11.9 - 14.6 %    PLATELET 069 (L) 488 - 450 K/uL    MPV 9.9 (L) 10.8 - 13.2 FL   METABOLIC PANEL, BASIC    Collection Time: 07/01/17  7:14 AM   Result Value Ref Range    Sodium 146 (H) 136 - 145 mmol/L    Potassium 2.8 (LL) 3.5 - 5.1 mmol/L    Chloride 109 (H) 98 - 107 mmol/L    CO2 27 21 - 32 mmol/L    Anion gap 10 7 - 16 mmol/L    Glucose 89 65 - 100 mg/dL    BUN 10 8 - 23 MG/DL    Creatinine 0.52 (L) 0.6 - 1.0 MG/DL    GFR est AA >60 >60 ml/min/1.73m2    GFR est non-AA >60 >60 ml/min/1.73m2    Calcium 9.0 8.3 - 10.4 MG/DL   MAGNESIUM    Collection Time: 07/01/17  7:14 AM   Result Value Ref Range    Magnesium 1.6 (L) 1.8 - 2.4 mg/dL   PLEASE READ & DOCUMENT PPD TEST IN 72 HRS    Collection Time: 07/01/17  4:09 PM   Result Value Ref Range    PPD  Negative    mm Induration  0 mm       All Micro Results     Procedure Component Value Units Date/Time    C. DIFFICILE/EPI PCR [[id number]]     Order Status:  Sent Specimen:  Stool     CULTURE, STOOL [[id number]]  (Abnormal) Collected:  06/30/17 0345    Order Status:  Completed Specimen:  Stool Updated:  07/01/17 0859     Special Requests: NO SPECIAL REQUESTS        Culture result:         NO GROWTH OF SALMONELLA OR SHIGELLA IS EVIDENT ON FIRST READING, FINAL REPORT TO FOLLOW AFTER FURTHER INCUBATION OF CULTURE (A)    CULTURE, URINE [[id number]]  (Abnormal)  (Susceptibility) Collected:  06/28/17 0335    Order Status:  Completed Specimen:  Urine from Clean catch Updated:  07/01/17 0803     Special Requests: NO SPECIAL REQUESTS        Culture result:       >100,000  COLONIES/mL  KLEBSIELLA PNEUMONIAE   (A)            <10,000  COLONIES/mL  NORMAL SKIN JESSICA ISOLATED      C. DIFFICILE/EPI PCR [[id number]] Collected:  06/29/17 1045    Order Status:  Canceled Specimen:  Stool     MRSA SCREEN - PCR (NASAL) [[id number]] Collected:  06/26/17 2235    Order Status:  Completed Specimen:  Nasal from Nares Updated:  06/27/17 0021     Special Requests: NO SPECIAL REQUESTS        Culture result:         MRSA target DNA is not detected (presumptive not colonized with MRSA)          Imaging /Procedures /Studies:      CT Results  (Last 48 hours)               06/26/17 1657  CT HEAD WO CONT Final result    Impression:  IMPRESSION:       1. No acute intracranial findings. 2. Moderate cerebral volume loss. 3. Chronic left maxillary sinus disease. Narrative:  HEAD CT WITHOUT CONTRAST  6/26/2017        HISTORY:   AMS  ; history of head trauma. TECHNIQUE: Noncontrast axial images were obtained through the brain. All CT   scans at this facility used dose modulation, interactive reconstruction and/or   weight based dosing when appropriate to reduce radiation dose to as low as   reasonably achievable. COMPARISON: November 9, 2016       FINDINGS: There is no acute intracranial hemorrhage, significant mass effect or   CT evidence of acute large artery territorial infarction. Please note that a   hyperacute infarct or small vessel infarct may not be apparent on initial CT   imaging. Moderate cerebral volume loss is present. A few areas of low attenuation are   present in the supratentorial white matter, suggestive of chronic small vessel   ischemic disease.        There is no hydrocephalus , intra-axial mass or abnormal extra-axial fluid   collection. There are no displaced skull fractures. The left maxillary sinus is   partially opacified. 06/26/17 1657  CT SPINE CERV WO CONT Final result    Impression:  IMPRESSION:       1. No acute findings in the cervical spine. 2. Multilevel facet degenerative change and degenerative disc disease with   foraminal stenosis present as described. Narrative:  CT CERVICAL SPINE WITHOUT CONTRAST 6/26/2017       HISTORY: Trauma. Altered mental status. TECHNIQUE: Noncontrast axial images were obtained from the craniocervical   junction through T3. Multiplanar reformatted images were generated. All CT   scans at this facility used dose modulation, iterative reconstruction and/or   weight based dosing when appropriate to reduce radiation dose to as low as   reasonably achievable. COMPARISON: November 9, 2016       FINDINGS: The cervical vertebrae are normal in height and alignment. The   articular facets overlap appropriately. Multilevel facet degenerative change is   present. Degenerative disc disease is present at C3-C4, C4-C5 and C5-C6 with   foraminal stenosis at these levels. There is no prevertebral soft tissue   swelling. Axial images demonstrate no displaced cervical spine fracture. Included portions   of the lung apices are clear. EEG 3/28/17: Technique: This EEG was performed using the [employer] 10/20 System. An EKG was monitored. The length of the recording was 30 minutes. State of Consciousness: awake, drowsy, asleep and awake,drowsy and asleep . Stage II. Description:     10 / 20 IEP 21 channel [employer] equipment bipolar / referential recordings for 30 + minutes using standard montages and technique. Background:  Normal.       8.5 hx 50 uV symmetric. Rhythms:  Normal. Good  Except an intermittent 2.5 Hz high amplitude  To 3 Hz delta activity lasting from 3 to 5 seconds during drowse. Epileptiform:  None.    Symmetry : Normal.     Alpha:  8 hz = normal amount. Normal attenuation with eye opening. Beta:   13 + Hz and good amplitudes :  Normal amount. Normal symmetry. Theta:   5-7 Hz:  Normal amounts. Delta:   2 - 3 hz:   Normal amounts. Is paroxysm rhythm delta rhythm as noted. EK NSR     Activation Procedures:  Hyperventlation: *none/age   Photic Stimulation: done    Interpretation: This is an abnormal EEG due to the presence of intermittent paroxysmal delta rhythms compatible with generalized epilepsy or primary generalzied paroxysms. Correlation and recommendations[de-identified]   Medication for Anti epileptic therapy is recommended such as divalproex treatment. Labs and Studies from previous 24 hours have been personally reviewed by myself [place] [facility]    Diagnosis Date Noted    UTI (urinary tract infection) 2017    Hypokalemia 2017    Hypomagnesemia 2017    Acute metabolic encephalopathy     Seizure disorder ([facility] [address] 2016    Acute encephalopathy 2016    Long term (current) use of anticoagulants     Immunosuppressed status ([facility] 2014    GERD (gastroesophageal reflux disease) 2013    PUD (peptic ulcer disease) 2013    HTN (hypertension)--Dr. Gera Armas 2013    S/P AVR (aortic valve replacement) 2010       Plan:  -  Mental status back at baseline  - seen by teleneurology - recommended to continue [address] only. Repeat EEG w/o seizures  - INR:2.5 - per cardiology notes INR goal of 2.0-3.0 - high risk of bleeding higher due to recent hx of Jason ulcers  - coumadin management per pharmacy  - add imodium for Diarrhea   - on Protonix PO BID for recent hx of Jason ulcers  - levothyroxine increased - TSH:10.80 - will need repeat TSH in 2-3 weeks. -  Vitamin B1:2510, RPR : nonreactive.   - urine culture with Klebsiella - sensitivity pending  - continue IV Rocephin.   - increased Coreg - PRN  hydralazine 10 mg IV PRN if BP >160/90 mmHg. BP better  - BM improved after restarted on home sulfasalazine  -  R elbow CT w/o fracture  - replace K and Mg - recheck  - PT/OT/PPD - will need long term placement? DVT Prophylaxis: coumadin  CODE Status: Full CODE   Plan of Care Discussed with: patient. Care team  Medical Risk: moderate  Disposition: has bed at [facility] tomorrow if medically stable.           Shanon Caceres MD  07/01/17

## 2017-07-02 LAB
ANION GAP BLD CALC-SCNC: 8 MMOL/L (ref 7–16)
BACTERIA SPEC CULT: NORMAL
BUN SERPL-MCNC: 11 MG/DL (ref 8–23)
CALCIUM SERPL-MCNC: 8.1 MG/DL (ref 8.3–10.4)
CHLORIDE SERPL-SCNC: 111 MMOL/L (ref 98–107)
CO2 SERPL-SCNC: 26 MMOL/L (ref 21–32)
CREAT SERPL-MCNC: 0.51 MG/DL (ref 0.6–1)
ERYTHROCYTE [DISTWIDTH] IN BLOOD BY AUTOMATED COUNT: 14 % (ref 11.9–14.6)
GLUCOSE SERPL-MCNC: 81 MG/DL (ref 65–100)
HCT VFR BLD AUTO: 37.8 % (ref 35.8–46.3)
HGB BLD-MCNC: 12.5 G/DL (ref 11.7–15.4)
INR PPP: 2.9 (ref 0.9–1.2)
INR PPP: 3.2 (ref 0.9–1.2)
MAGNESIUM SERPL-MCNC: 2 MG/DL (ref 1.8–2.4)
MCH RBC QN AUTO: 31.4 PG (ref 26.1–32.9)
MCHC RBC AUTO-ENTMCNC: 33.1 G/DL (ref 31.4–35)
MCV RBC AUTO: 95 FL (ref 79.6–97.8)
PLATELET # BLD AUTO: 112 K/UL (ref 150–450)
PMV BLD AUTO: 9.8 FL (ref 10.8–14.1)
POTASSIUM SERPL-SCNC: 3.8 MMOL/L (ref 3.5–5.1)
PROTHROMBIN TIME: 32.1 SEC (ref 9.6–12)
PROTHROMBIN TIME: 34.9 SEC (ref 9.6–12)
RBC # BLD AUTO: 3.98 M/UL (ref 4.05–5.25)
SERVICE CMNT-IMP: NORMAL
SODIUM SERPL-SCNC: 145 MMOL/L (ref 136–145)
WBC # BLD AUTO: 6.7 K/UL (ref 4.3–11.1)

## 2017-07-02 PROCEDURE — 74011636637 HC RX REV CODE- 636/637: Performed by: INTERNAL MEDICINE

## 2017-07-02 PROCEDURE — 74011000258 HC RX REV CODE- 258: Performed by: HOSPITALIST

## 2017-07-02 PROCEDURE — 36415 COLL VENOUS BLD VENIPUNCTURE: CPT | Performed by: INTERNAL MEDICINE

## 2017-07-02 PROCEDURE — 83735 ASSAY OF MAGNESIUM: CPT | Performed by: INTERNAL MEDICINE

## 2017-07-02 PROCEDURE — 85027 COMPLETE CBC AUTOMATED: CPT | Performed by: INTERNAL MEDICINE

## 2017-07-02 PROCEDURE — 74011250637 HC RX REV CODE- 250/637: Performed by: INTERNAL MEDICINE

## 2017-07-02 PROCEDURE — 74011250637 HC RX REV CODE- 250/637: Performed by: HOSPITALIST

## 2017-07-02 PROCEDURE — 80048 BASIC METABOLIC PNL TOTAL CA: CPT | Performed by: INTERNAL MEDICINE

## 2017-07-02 PROCEDURE — 65660000000 HC RM CCU STEPDOWN

## 2017-07-02 PROCEDURE — 85610 PROTHROMBIN TIME: CPT | Performed by: INTERNAL MEDICINE

## 2017-07-02 RX ORDER — CEFPODOXIME PROXETIL 200 MG/1
200 TABLET, FILM COATED ORAL EVERY 12 HOURS
Status: DISCONTINUED | OUTPATIENT
Start: 2017-07-02 | End: 2017-07-03 | Stop reason: HOSPADM

## 2017-07-02 RX ORDER — POTASSIUM CHLORIDE 20 MEQ/1
40 TABLET, EXTENDED RELEASE ORAL DAILY
Status: DISCONTINUED | OUTPATIENT
Start: 2017-07-03 | End: 2017-07-03 | Stop reason: HOSPADM

## 2017-07-02 RX ADMIN — PREDNISONE 5 MG: 5 TABLET ORAL at 08:10

## 2017-07-02 RX ADMIN — CEFPODOXIME PROXETIL 200 MG: 200 TABLET, FILM COATED ORAL at 21:32

## 2017-07-02 RX ADMIN — SODIUM CHLORIDE 75 ML/HR: 450 INJECTION, SOLUTION INTRAVENOUS at 02:26

## 2017-07-02 RX ADMIN — LEVETIRACETAM 500 MG: 500 TABLET ORAL at 16:13

## 2017-07-02 RX ADMIN — PANTOPRAZOLE SODIUM 40 MG: 40 TABLET, DELAYED RELEASE ORAL at 05:32

## 2017-07-02 RX ADMIN — POTASSIUM CHLORIDE 40 MEQ: 20 TABLET, EXTENDED RELEASE ORAL at 08:10

## 2017-07-02 RX ADMIN — LACTOBACILLUS TAB 2 TABLET: TAB at 16:13

## 2017-07-02 RX ADMIN — SULFASALAZINE 1000 MG: 500 TABLET ORAL at 08:09

## 2017-07-02 RX ADMIN — VENLAFAXINE HYDROCHLORIDE 150 MG: 150 CAPSULE, EXTENDED RELEASE ORAL at 08:10

## 2017-07-02 RX ADMIN — TRAZODONE HYDROCHLORIDE 50 MG: 50 TABLET ORAL at 21:32

## 2017-07-02 RX ADMIN — FERROUS SULFATE TAB 325 MG (65 MG ELEMENTAL FE) 325 MG: 325 (65 FE) TAB at 05:32

## 2017-07-02 RX ADMIN — Medication 400 MG: at 08:09

## 2017-07-02 RX ADMIN — CARVEDILOL 12.5 MG: 12.5 TABLET, FILM COATED ORAL at 16:13

## 2017-07-02 RX ADMIN — Medication 5 ML: at 05:34

## 2017-07-02 RX ADMIN — LEVOTHYROXINE SODIUM 125 MCG: 125 TABLET ORAL at 05:32

## 2017-07-02 RX ADMIN — ACETAMINOPHEN 500 MG: 500 TABLET ORAL at 21:33

## 2017-07-02 RX ADMIN — CARVEDILOL 12.5 MG: 12.5 TABLET, FILM COATED ORAL at 08:10

## 2017-07-02 RX ADMIN — WARFARIN SODIUM 1 MG: 1 TABLET ORAL at 21:33

## 2017-07-02 RX ADMIN — PANTOPRAZOLE SODIUM 40 MG: 40 TABLET, DELAYED RELEASE ORAL at 16:13

## 2017-07-02 RX ADMIN — Medication 5 ML: at 21:36

## 2017-07-02 RX ADMIN — SULFASALAZINE 1000 MG: 500 TABLET ORAL at 16:13

## 2017-07-02 RX ADMIN — LEVETIRACETAM 500 MG: 500 TABLET ORAL at 08:10

## 2017-07-02 RX ADMIN — Medication 5 ML: at 14:00

## 2017-07-02 RX ADMIN — CEFPODOXIME PROXETIL 200 MG: 200 TABLET, FILM COATED ORAL at 10:45

## 2017-07-02 RX ADMIN — FOLIC ACID 1 MG: 1 TABLET ORAL at 08:10

## 2017-07-02 RX ADMIN — LACTOBACILLUS TAB 2 TABLET: TAB at 08:09

## 2017-07-02 NOTE — PROGRESS NOTES
Patient relaxing in bed with no complaints at this time. Patient has been given a BSC and is able to use it and transfer independently. Call light within reach. Will continue to monitor.

## 2017-07-02 NOTE — PROGRESS NOTES
Physical assessment completed, pt alert and oriented, resting in bed denies pain or distress, call light within reach.

## 2017-07-02 NOTE — PROGRESS NOTES
Patient in bed resting with no complaints at this time. Patient is alert and orientated with no distress noted. IV intact and patent with no s/s of infection noted. Respirations even and unlabored with heart rate regular. Patient unable to ambulate independently without assistance; needs x1 r/t weakness and hx of falls. Bed in low locked position with call light within reach. Patient instructed to call if assistance is needed. Will continue to monitor.

## 2017-07-02 NOTE — PROGRESS NOTES
Hospitalist Progress Note    2017  Admit Date: 2017  3:05 PM   NAME: Gisela Dejesus   :  1945   DOS:              17  MRN:  [id number]   Attending: Jacquelyn Anand MD  PCP:  Lisbet Jarrett MD  Treatment Team: Attending Provider: Nicole Sena MD; Utilization Review: Nasra Teague RN; Care Manager: Vanessa Grayson. Eunice Rangel    Full Code     SUBJECTIVE:   As previously documented: \" Ms. Nicholas Grade is a 81 yo F with a hx of cad, s/p AVR, pAFib on chronic  Coumadin, Hx of CVA, Hx of DVT, HTN,  Hypothyroidism, dermatomyositis with chronic steroid use with osteoporosis, Hx of Jason ulcers with Endo clip x2  On 2017,  who was admitted on 2017 from longterm with acute encephalopathy and falls. CT head on admission negative for acute abnormalities. Changed to inpatient status on 17. Extensive review of prior medical records done - noted recent hx of Jason ulcers, hx of seizures with last EEG on 3/28/17 that showed intermittent paroxysmal delta rhythms compatible with generalized epilepsy of primary generalized paroxysm - on on Keppra that was changed to Depakote due to recent hypotensive events and multiple falls, although is unclear if  still taking it. EEG w/o seizure. Neurology recommended to continue [address]. \"         17   Ms. Gisela Dejesus clinical status is improving. Afebrile. Diarrhea better. Denies SOB, CP or abdominal pain. Anxious. INR:3.2       10+ ROS reviewed and negative except for positive in HPI.    Allergies   Allergen Reactions    Latex Anaphylaxis    Bee Sting [Sting, Bee] Shortness of Breath     anaphylatics    Adhesive Rash     Including Coban wrap     Current Facility-Administered Medications   Medication Dose Route Frequency Provider Last Rate Last Dose    LORazepam (ATIVAN) tablet 0.5 mg  0.5 mg Oral Q6H PRN Nicolette Braun MD   0.5 mg at 17 0418    potassium chloride (K-DUR, KLOR-CON) SR tablet 40 mEq  40 mEq Oral DAILY Jacquelyn Anand MD 40 mEq at 07/01/17 1016    0.45% sodium chloride infusion  75 mL/hr IntraVENous CONTINUOUS Wan Reed MD 75 mL/hr at 07/02/17 0226 75 mL/hr at 07/02/17 0226    warfarin (COUMADIN) tablet 1 mg - pharmacy dosing  1 mg Oral QPM Torrey Gomez MD   1 mg at 07/01/17 1617    magnesium oxide (MAG-OX) tablet 400 mg  400 mg Oral DAILY Wan Reed MD   400 mg at 07/01/17 1427    Lactobacillus Acidoph & Bulgar [facility]) tablet 2 Tab  2 Tab Oral BID Wan Reed MD   2 Tab at 07/01/17 1618    loperamide (IMODIUM) capsule 2 mg  2 mg Oral Q4H PRN Wan Reed MD   2 mg at 07/01/17 1427    traZODone (DESYREL) tablet 50 mg  50 mg Oral QHS Wan Reed MD   50 mg at 07/01/17 2141    venlafaxine-SR (EFFEXOR-XR) capsule 150 mg  150 mg Oral DAILY WITH BREAKFAST Wan Reed MD   150 mg at 07/01/17 0821    levETIRAcetam (KEPPRA) tablet 500 mg  500 mg Oral BID Wan Reed MD   500 mg at 07/01/17 1618    cefTRIAXone (ROCEPHIN) 1 g in 0.9% sodium chloride (MBP/ADV) 50 mL  1 g IntraVENous Q24H Wan Reed  mL/hr at 07/01/17 1245 1 g at 07/01/17 1245    dicyclomine (BENTYL) capsule 10 mg  10 mg Oral DAILY PRN Wan Reed MD   10 mg at 06/29/17 2042    sulfaSALAzine (AZULFIDINE) tablet 1,000 mg  1,000 mg Oral BID WITH MEALS Wan Reed MD   1,000 mg at 07/01/17 1618    hydrALAZINE (APRESOLINE) 20 mg/mL injection 10 mg  10 mg IntraVENous Q6H PRN Wan Reed MD   10 mg at 07/01/17 0315    carvedilol (COREG) tablet 12.5 mg  12.5 mg Oral BID WITH MEALS Wan Reed MD   12.5 mg at 07/01/17 1618    levothyroxine (SYNTHROID) tablet 125 mcg  125 mcg Oral ACB Wan Reed MD   125 mcg at 07/02/17 0532    pantoprazole (PROTONIX) tablet 40 mg  40 mg Oral ACB&D Wan Reed MD   40 mg at 07/02/17 0532    albuterol (PROVENTIL VENTOLIN) nebulizer solution 2.5 mg  2.5 mg Nebulization Q6H PRN Wan Reed MD        acetaminophen (TYLENOL) tablet 500 mg  500 mg Oral Q6H PRN Yasmine Ziegler MD   500 mg at 17 2219    ferrous sulfate tablet 325 mg  1 Tab Oral ACB Yasmine Ziegler MD   325 mg at  3220    folic acid (FOLVITE) tablet 1 mg  1 mg Oral DAILY Yasmine Ziegler MD   1 mg at 17 9706    predniSONE (DELTASONE) tablet 5 mg  5 mg Oral DAILY WITH BREAKFAST Yasmine Ziegler MD   5 mg at 17 9130    sodium chloride (NS) flush 5-10 mL  5-10 mL IntraVENous Q8H Yasmine Ziegler MD   5 mL at 17 0534    budesonide (PULMICORT) 500 mcg/2 ml nebulizer suspension  500 mcg Nebulization BID RT Yasmine Ziegler MD   500 mcg at 17         PHYSICAL EXAM     Visit Vitals    /71 (BP 1 Location: Right arm, BP Patient Position: At rest)    Pulse 73    Temp 97.6 °F (36.4 °C)    Resp 17    Ht 5' 3\" (1.6 m)    Wt 57.2 kg (126 lb)    SpO2 95%    BMI 22.32 kg/m2      Temp (24hrs), Av.9 °F (36.6 °C), Min:97.3 °F (36.3 °C), Max:98.7 °F (37.1 °C)    Oxygen Therapy  O2 Sat (%): 95 % (17 0335)  Pulse via Oximetry: 74 beats per minute (17 193)  O2 Device: Room air (17 0303)    Intake/Output Summary (Last 24 hours) at 17 0707  Last data filed at 17 1724   Gross per 24 hour   Intake              396 ml   Output                0 ml   Net              396 ml        Physical Exam:  General:         Awake, alert. No acute distress. Afebrile. Cooperative. Looks chronically debilitated  HEENT:               NCAT. No obvious deformity. Nares normal. No drainage. Hematoma on mandibular castillo  Lungs:                  CTABL. No wheezing/rhonchi/rales  Cardiovascular:   RRR. No m/r/g. No pedal edema b/l. +2 PT/DT pulses b/l. Abdomen:       S/nt/nd. Bowel sounds normal. .   Skin:         Hematoma on mandibular area and right elbow. Not Jaundiced  Neurologic:    AAOX3. Kaylan Moreno CN II- XII grossly WNL. No gross focal deficit.    Heme/Lymph/Immune: No petechiae, echymoses, overt signs of bleeding or lymphadenopathy. Psychiatric:         Euthymic. DIAGNOSTIC STUDIES      Data Review:   Recent Results (from the past 24 hour(s))   PROTHROMBIN TIME + INR    Collection Time: 07/01/17  7:14 AM   Result Value Ref Range    Prothrombin time 27.9 (H) 9.6 - 12.0 sec    INR 2.5 (H) 0.9 - 1.2     CBC W/O DIFF    Collection Time: 07/01/17  7:14 AM   Result Value Ref Range    WBC 7.5 4.3 - 11.1 K/uL    RBC 4.47 4.05 - 5.25 M/uL    HGB 14.0 11.7 - 15.4 g/dL    HCT 41.4 35.8 - 46.3 %    MCV 92.6 79.6 - 97.8 FL    MCH 31.3 26.1 - 32.9 PG    MCHC 33.8 31.4 - 35.0 g/dL    RDW 13.8 11.9 - 14.6 %    PLATELET 353 (L) 323 - 450 K/uL    MPV 9.9 (L) 10.8 - 30.9 FL   METABOLIC PANEL, BASIC    Collection Time: 07/01/17  7:14 AM   Result Value Ref Range    Sodium 146 (H) 136 - 145 mmol/L    Potassium 2.8 (LL) 3.5 - 5.1 mmol/L    Chloride 109 (H) 98 - 107 mmol/L    CO2 27 21 - 32 mmol/L    Anion gap 10 7 - 16 mmol/L    Glucose 89 65 - 100 mg/dL    BUN 10 8 - 23 MG/DL    Creatinine 0.52 (L) 0.6 - 1.0 MG/DL    GFR est AA >60 >60 ml/min/1.73m2    GFR est non-AA >60 >60 ml/min/1.73m2    Calcium 9.0 8.3 - 10.4 MG/DL   MAGNESIUM    Collection Time: 07/01/17  7:14 AM   Result Value Ref Range    Magnesium 1.6 (L) 1.8 - 2.4 mg/dL   PLEASE READ & DOCUMENT PPD TEST IN 72 HRS    Collection Time: 07/01/17  4:09 PM   Result Value Ref Range    PPD  Negative    mm Induration  0 mm       All Micro Results     Procedure Component Value Units Date/Time    CULTURE, STOOL [[id number]] Collected:  06/30/17 0345    Order Status:  Completed Specimen:  Stool Updated:  07/02/17 0810     Special Requests: NO SPECIAL REQUESTS        Culture result:         No Salmonella, Shigella, or Ecoli 0157 isolated.     C. DIFFICILE/EPI PCR [[id number]]     Order Status:  Sent Specimen:  Stool     CULTURE, URINE [[id number]]  (Abnormal)  (Susceptibility) Collected:  06/28/17 0335    Order Status:  Completed Specimen:  Urine from Clean catch Updated:  07/01/17 4059 Special Requests: NO SPECIAL REQUESTS        Culture result:       >100,000  COLONIES/mL  KLEBSIELLA PNEUMONIAE   (A)            <10,000  COLONIES/mL  NORMAL SKIN JESSICA ISOLATED      C. DIFFICILE/EPI PCR [[id number]] Collected:  06/29/17 1045    Order Status:  Canceled Specimen:  Stool     MRSA SCREEN - PCR (NASAL) [[id number]] Collected:  06/26/17 2235    Order Status:  Completed Specimen:  Nasal from Nares Updated:  06/27/17 0021     Special Requests: NO SPECIAL REQUESTS        Culture result:         MRSA target DNA is not detected (presumptive not colonized with MRSA)          Imaging /Procedures /Studies:      CT Results  (Last 48 hours)               06/26/17 1657  CT HEAD WO CONT Final result    Impression:  IMPRESSION:       1. No acute intracranial findings. 2. Moderate cerebral volume loss. 3. Chronic left maxillary sinus disease. Narrative:  HEAD CT WITHOUT CONTRAST  6/26/2017        HISTORY:   AMS  ; history of head trauma. TECHNIQUE: Noncontrast axial images were obtained through the brain. All CT   scans at this facility used dose modulation, interactive reconstruction and/or   weight based dosing when appropriate to reduce radiation dose to as low as   reasonably achievable. COMPARISON: November 9, 2016       FINDINGS: There is no acute intracranial hemorrhage, significant mass effect or   CT evidence of acute large artery territorial infarction. Please note that a   hyperacute infarct or small vessel infarct may not be apparent on initial CT   imaging. Moderate cerebral volume loss is present. A few areas of low attenuation are   present in the supratentorial white matter, suggestive of chronic small vessel   ischemic disease. There is no hydrocephalus , intra-axial mass or abnormal extra-axial fluid   collection. There are no displaced skull fractures. The left maxillary sinus is   partially opacified.            06/26/17 1657  CT SPINE CERV WO CONT Final result    Impression:  IMPRESSION:       1. No acute findings in the cervical spine. 2. Multilevel facet degenerative change and degenerative disc disease with   foraminal stenosis present as described. Narrative:  CT CERVICAL SPINE WITHOUT CONTRAST 6/26/2017       HISTORY: Trauma. Altered mental status. TECHNIQUE: Noncontrast axial images were obtained from the craniocervical   junction through T3. Multiplanar reformatted images were generated. All CT   scans at this facility used dose modulation, iterative reconstruction and/or   weight based dosing when appropriate to reduce radiation dose to as low as   reasonably achievable. COMPARISON: November 9, 2016       FINDINGS: The cervical vertebrae are normal in height and alignment. The   articular facets overlap appropriately. Multilevel facet degenerative change is   present. Degenerative disc disease is present at C3-C4, C4-C5 and C5-C6 with   foraminal stenosis at these levels. There is no prevertebral soft tissue   swelling. Axial images demonstrate no displaced cervical spine fracture. Included portions   of the lung apices are clear. EEG 3/28/17: Technique: This EEG was performed using the [employer] 10/20 System. An EKG was monitored. The length of the recording was 30 minutes. State of Consciousness: awake, drowsy, asleep and awake,drowsy and asleep . Stage II. Description:     10 / 20 IEP 21 channel [employer] equipment bipolar / referential recordings for 30 + minutes using standard montages and technique. Background:  Normal.       8.5 hx 50 uV symmetric. Rhythms:  Normal. Good  Except an intermittent 2.5 Hz high amplitude  To 3 Hz delta activity lasting from 3 to 5 seconds during drowse. Epileptiform:  None. Symmetry :  Normal.     Alpha:  8 hz = normal amount. Normal attenuation with eye opening. Beta:   13 + Hz and good amplitudes :  Normal amount. Normal symmetry.     Theta: 5-7 Hz:  Normal amounts. Delta:   2 - 3 hz:   Normal amounts. Is paroxysm rhythm delta rhythm as noted. EK NSR     Activation Procedures:  Hyperventlation: *none/age   Photic Stimulation: done    Interpretation: This is an abnormal EEG due to the presence of intermittent paroxysmal delta rhythms compatible with generalized epilepsy or primary generalzied paroxysms. Correlation and recommendations[de-identified]   Medication for Anti epileptic therapy is recommended such as divalproex treatment. Labs and Studies from previous 24 hours have been personally reviewed by myself Duc Problems    Diagnosis Date Noted    UTI (urinary tract infection) 2017    Hypokalemia 2017    Hypomagnesemia 2017    Acute metabolic encephalopathy     Seizure disorder ([facility] [address] 2016    Acute encephalopathy 2016    Long term (current) use of anticoagulants     [facility]    GERD (gastroesophageal reflux disease) 2013    PUD (peptic ulcer disease) 2013    HTN (hypertension)--Dr. Mccabe Gone 2013    S/P AVR (aortic valve replacement) 2010       Plan:  -  Mental status back at baseline  - seen by teleneurology - recommended to continue [address] only. Repeat EEG w/o seizures  - INR:3.2 - fast elevateion from 1.5 - will recheck in PM and will hold vs decrease the dose tonight . - per cardiology notes INR goal of 2.0-3.0 - high risk of bleeding higher due to recent hx of Jason ulcers  - diarrhea improving - stool culure negative. CDiff note done because didn't met criteria. UTI Klebsiella - change to PO Vantin for full course   - on Protonix PO BID for recent hx of Jason ulcers  - levothyroxine increased - TSH:10.80 - will need repeat TSH in 2-3 weeks. -  Vitamin B1:2510, RPR : nonreactive.   - BP better - monitor   -  R elbow CT w/o fracture  - K and Mg stable   - PT/OT/PPD - d/c to SNF tomorrow AM if INR not trending up - will need long term placement vs ALS? DVT Prophylaxis: coumadin  CODE Status: Full CODE   Plan of Care Discussed with: patient. Care team  Medical Risk: moderate  Disposition: has bed at [facility] tomorrow if medically stable.           Sg Montgomery MD  07/02/17

## 2017-07-02 NOTE — PROGRESS NOTES
Warfarin dosing per pharmacist    Chris Raya is a 80 y.o. female. Height: 5' 3\" (160 cm)    Weight: 57.2 kg (126 lb)    Indication:  A. Fib, Hx AVR    Goal INR:  2-3 per anticoag notes    Home dose:  2 mg Tues, Sat; 4 mg all other days    Risk factors or significant drug interactions:  levothyroxine    Other anticoagulants:  none    Daily Monitoring  Date  INR     Warfarin dose HGB              Notes  6/27  2.2 (6/26) 2 mg  13.1    6/28  1.5  4 mg  13.0  6/29  1.3  4 mg  ---  6/30  1.4  4 mg  14.4  7/1  2.5  1 mg  14.0  7/2  3.2  1 mg  12.5    Pharmacy consulted to manage warfarin on 6/27/17 by Dr. Real Maier    80 YOF with a history of A. Fib on warfarin as an outpatient. INR up to 3.2. Will continue with 1 mg tonight and follow daily INR. Will need to hold a dose if increases again tomorrow. Pharmacy will continue to follow. Please call with any questions.      Thank you,  Anton Burgos, PharmD  Clinical Pharmacist  [phone]

## 2017-07-02 NOTE — PROGRESS NOTES
Patient stable with no complaints at this time. Patient steady transferring to [facility] without assistance. Call light within reach.   Report to be given to oncoming RN 7p-7a

## 2017-07-02 NOTE — PROGRESS NOTES
Pt resting in bed, denies pain or distress, IV infusing 1/2 NS at 75 per order, call light within reach.

## 2017-07-03 ENCOUNTER — PATIENT OUTREACH (OUTPATIENT)
Dept: CASE MANAGEMENT | Age: 72
End: 2017-07-03

## 2017-07-03 VITALS
TEMPERATURE: 98.3 F | RESPIRATION RATE: 18 BRPM | DIASTOLIC BLOOD PRESSURE: 78 MMHG | OXYGEN SATURATION: 96 % | WEIGHT: 126 LBS | BODY MASS INDEX: 22.32 KG/M2 | SYSTOLIC BLOOD PRESSURE: 116 MMHG | HEART RATE: 94 BPM | HEIGHT: 63 IN

## 2017-07-03 LAB
ANION GAP BLD CALC-SCNC: 8 MMOL/L (ref 7–16)
BUN SERPL-MCNC: 11 MG/DL (ref 8–23)
CALCIUM SERPL-MCNC: 8.9 MG/DL (ref 8.3–10.4)
CHLORIDE SERPL-SCNC: 104 MMOL/L (ref 98–107)
CO2 SERPL-SCNC: 29 MMOL/L (ref 21–32)
CREAT SERPL-MCNC: 0.65 MG/DL (ref 0.6–1)
ERYTHROCYTE [DISTWIDTH] IN BLOOD BY AUTOMATED COUNT: 13.9 % (ref 11.9–14.6)
GLUCOSE SERPL-MCNC: 94 MG/DL (ref 65–100)
HCT VFR BLD AUTO: 43.2 % (ref 35.8–46.3)
HGB BLD-MCNC: 14.2 G/DL (ref 11.7–15.4)
INR PPP: 2.9 (ref 0.9–1.2)
MAGNESIUM SERPL-MCNC: 1.9 MG/DL (ref 1.8–2.4)
MCH RBC QN AUTO: 31.3 PG (ref 26.1–32.9)
MCHC RBC AUTO-ENTMCNC: 32.9 G/DL (ref 31.4–35)
MCV RBC AUTO: 95.4 FL (ref 79.6–97.8)
PLATELET # BLD AUTO: 157 K/UL (ref 150–450)
PMV BLD AUTO: 9.7 FL (ref 10.8–14.1)
POTASSIUM SERPL-SCNC: 4 MMOL/L (ref 3.5–5.1)
PROTHROMBIN TIME: 31.3 SEC (ref 9.6–12)
RBC # BLD AUTO: 4.53 M/UL (ref 4.05–5.25)
SODIUM SERPL-SCNC: 141 MMOL/L (ref 136–145)
WBC # BLD AUTO: 6.6 K/UL (ref 4.3–11.1)

## 2017-07-03 PROCEDURE — 80048 BASIC METABOLIC PNL TOTAL CA: CPT | Performed by: HOSPITALIST

## 2017-07-03 PROCEDURE — 74011636637 HC RX REV CODE- 636/637: Performed by: INTERNAL MEDICINE

## 2017-07-03 PROCEDURE — 85027 COMPLETE CBC AUTOMATED: CPT | Performed by: HOSPITALIST

## 2017-07-03 PROCEDURE — 74011250637 HC RX REV CODE- 250/637: Performed by: INTERNAL MEDICINE

## 2017-07-03 PROCEDURE — 97535 SELF CARE MNGMENT TRAINING: CPT

## 2017-07-03 PROCEDURE — 85610 PROTHROMBIN TIME: CPT | Performed by: HOSPITALIST

## 2017-07-03 PROCEDURE — 74011250637 HC RX REV CODE- 250/637: Performed by: HOSPITALIST

## 2017-07-03 PROCEDURE — 36415 COLL VENOUS BLD VENIPUNCTURE: CPT | Performed by: HOSPITALIST

## 2017-07-03 PROCEDURE — 97530 THERAPEUTIC ACTIVITIES: CPT

## 2017-07-03 PROCEDURE — 83735 ASSAY OF MAGNESIUM: CPT | Performed by: HOSPITALIST

## 2017-07-03 RX ORDER — LANOLIN ALCOHOL/MO/W.PET/CERES
400 CREAM (GRAM) TOPICAL DAILY
Qty: 30 TAB | Refills: 0 | Status: SHIPPED | OUTPATIENT
Start: 2017-07-03 | End: 2017-08-17

## 2017-07-03 RX ORDER — WARFARIN 1 MG/1
2 TABLET ORAL EVERY EVENING
Status: DISCONTINUED | OUTPATIENT
Start: 2017-07-03 | End: 2017-07-03 | Stop reason: HOSPADM

## 2017-07-03 RX ORDER — WARFARIN 2 MG/1
2 TABLET ORAL ONCE
Qty: 1 TAB | Refills: 0 | Status: SHIPPED | OUTPATIENT
Start: 2017-07-03 | End: 2017-07-03

## 2017-07-03 RX ORDER — PANTOPRAZOLE SODIUM 40 MG/1
40 TABLET, DELAYED RELEASE ORAL DAILY
Qty: 30 TAB | Refills: 0 | Status: SHIPPED | OUTPATIENT
Start: 2017-07-03

## 2017-07-03 RX ORDER — DIPHENOXYLATE HYDROCHLORIDE AND ATROPINE SULFATE 2.5; .025 MG/1; MG/1
1 TABLET ORAL
Status: DISCONTINUED | OUTPATIENT
Start: 2017-07-03 | End: 2017-07-03 | Stop reason: HOSPADM

## 2017-07-03 RX ORDER — WARFARIN 2 MG/1
TABLET ORAL
Qty: 30 TAB | Refills: 0 | Status: SHIPPED | OUTPATIENT
Start: 2017-07-04 | End: 2019-06-21

## 2017-07-03 RX ORDER — LEVOTHYROXINE SODIUM 125 UG/1
125 TABLET ORAL
Qty: 21 TAB | Refills: 0 | Status: SHIPPED | OUTPATIENT
Start: 2017-07-03 | End: 2018-05-09

## 2017-07-03 RX ORDER — FLUCONAZOLE 100 MG/1
200 TABLET ORAL DAILY
Status: DISCONTINUED | OUTPATIENT
Start: 2017-07-03 | End: 2017-07-03 | Stop reason: HOSPADM

## 2017-07-03 RX ORDER — UREA 10 %
2 LOTION (ML) TOPICAL 2 TIMES DAILY
Qty: 60 TAB | Refills: 0 | Status: SHIPPED | OUTPATIENT
Start: 2017-07-03 | End: 2018-05-09

## 2017-07-03 RX ORDER — CEFPODOXIME PROXETIL 200 MG/1
200 TABLET, FILM COATED ORAL EVERY 12 HOURS
Qty: 20 TAB | Refills: 0 | Status: SHIPPED | OUTPATIENT
Start: 2017-07-03 | End: 2017-07-13

## 2017-07-03 RX ADMIN — Medication 5 ML: at 05:17

## 2017-07-03 RX ADMIN — FLUCONAZOLE 200 MG: 100 TABLET ORAL at 13:37

## 2017-07-03 RX ADMIN — LACTOBACILLUS TAB 2 TABLET: TAB at 08:39

## 2017-07-03 RX ADMIN — SULFASALAZINE 1000 MG: 500 TABLET ORAL at 08:41

## 2017-07-03 RX ADMIN — CEFPODOXIME PROXETIL 200 MG: 200 TABLET, FILM COATED ORAL at 08:41

## 2017-07-03 RX ADMIN — VENLAFAXINE HYDROCHLORIDE 150 MG: 150 CAPSULE, EXTENDED RELEASE ORAL at 08:41

## 2017-07-03 RX ADMIN — POTASSIUM CHLORIDE 40 MEQ: 20 TABLET, EXTENDED RELEASE ORAL at 08:39

## 2017-07-03 RX ADMIN — FERROUS SULFATE TAB 325 MG (65 MG ELEMENTAL FE) 325 MG: 325 (65 FE) TAB at 05:17

## 2017-07-03 RX ADMIN — PANTOPRAZOLE SODIUM 40 MG: 40 TABLET, DELAYED RELEASE ORAL at 05:16

## 2017-07-03 RX ADMIN — FOLIC ACID 1 MG: 1 TABLET ORAL at 08:39

## 2017-07-03 RX ADMIN — PREDNISONE 5 MG: 5 TABLET ORAL at 08:39

## 2017-07-03 RX ADMIN — CARVEDILOL 12.5 MG: 12.5 TABLET, FILM COATED ORAL at 08:39

## 2017-07-03 RX ADMIN — LEVOTHYROXINE SODIUM 125 MCG: 125 TABLET ORAL at 05:16

## 2017-07-03 RX ADMIN — LEVETIRACETAM 500 MG: 500 TABLET ORAL at 08:39

## 2017-07-03 NOTE — PROGRESS NOTES
Pt discharging to [facility] via [facility]. RN given report line and room number. SW will contact son to inform of dc.

## 2017-07-03 NOTE — PROGRESS NOTES
Problem: Self Care Deficits Care Plan (Adult)  Goal: *Acute Goals and Plan of Care (Insert Text)  1. Patient will complete full body bathing and dressing with setup and adaptive equipment as needed. 2. Patient will complete toileting with supervision. 3. Patient will tolerate 25 minutes of OT treatment with as needed rest breaks to increase activity tolerance for ADLs. 4. Patient will complete functional transfers with independence and adaptive equipment as needed. 5. Patient will complete grooming tasks with setup only in supported sitting at sink level. MET 7/3/17    Timeframe: 7 visits       OCCUPATIONAL THERAPY: Daily Note, Treatment Day: 2nd and AM 7/3/2017  INPATIENT: Hospital Day: 8  [place] / Product Type: Medicare /      NAME/AGE/GENDER: Gisela Dejesus is a 80 y.o. female     PRIMARY DIAGNOSIS:  Acute encephalopathy  Acute encephalopathy  Acute metabolic encephalopathy Acute encephalopathy Acute encephalopathy        ICD-10: Treatment Diagnosis:        · Generalized Muscle Weakness (M62.81)  · Other lack of cordination (R27.8)  · Difficulty in walking, Not elsewhere classified (R26.2)   Precautions/Allergies:        falls,  Latex; Bee sting [sting, bee]; and Adhesive       ASSESSMENT:      Ms. Cristopher Reed presents supine in bed upon arrival. She is alert and agreeable to OT tx session requesting self-care. She complete bed mobility with supervision and completes STS and transfer to sink with CGA/hand held assistance for steadying/balance. She is able to complete grooming/oral care/UB bathing/dressing with set up assistance while seated in chair. She completes LB bathing/dressing with set up assistance while seated in chair and completes josé miguel care with CGA while standing. Pt became tearful towards end of session as she is anxious about how she will afford an [facility] and requested this therapist to pray with her.  She was left in a better mood with PT to complete ambulation in halls. Ms. Sam Manuel met goal #5 and is expected to d/c today, but will follow up with OT services should she stay in the hospital to continually address stated goals and POC. This section established at most recent assessment   PROBLEM LIST (Impairments causing functional limitations):  1. Decreased Strength  2. Decreased ADL/Functional Activities  3. Decreased Transfer Abilities  4. Decreased Ambulation Ability/Technique  5. Decreased Balance  6. Decreased Activity Tolerance  7. Decreased Pacing Skills  8. Decreased Work Simplification/Energy Conservation Techniques  9. Decreased Cognition    INTERVENTIONS PLANNED: (Benefits and precautions of occupational therapy have been discussed with the patient.)  1. Activities of daily living training  2. Adaptive equipment training  3. Balance training  4. Clothing management  5. Cognitive training  6. Community reintergration  7. Donning&doffing training  8. Group therapy  9. Therapeutic activity  10. Therapeutic exercise  11. Safety training  12. Energy conservation      TREATMENT PLAN: Frequency/Duration: Follow patient 3x per week to address above goals. Rehabilitation Potential For Stated Goals: GOOD      RECOMMENDED REHABILITATION/EQUIPMENT: (at time of discharge pending progress): Continue Skilled Therapy. OCCUPATIONAL PROFILE AND HISTORY:   History of Present Injury/Illness (Reason for Referral): As previously documented: \" Ms. Sam Manuel is a 81 yo F with a hx of cad, s/p AVR, pAFibon chronic  Coumadin, Hx of CVA, Hx of DVT, HTN,  Hypothyroidism, dermatomyositis with chronic steroid use with osteoporosis, Hx of Jason ulcers with Endo clip x2  On April 2017,  who was admitted on 6/26/2017 from [facility] with acute encephalopathy and falls. CT head on admission negative for acute abnormalities. Changed to inpatient status on 6/26/17\"        06/28/17   Ms. Apolinar Hardwick continues to be lethargic and confused, although able to tell me her name and where she is . Afebrile. Denies SOB, CP or abdominal pain. Extensive review of prior medical records done - noted recent hx of Jason ulcers, hx of seizures with last EEG on 3/28/17 that showed intermittent paroxysmal delta rhythms compatible with generalized epilepsy of primary generalized paroxysm - on on Keppra that was changed to Depakote due to recent hypotensive events and multiple falls, although is unclear if it was d/c by pain medicine provider? Discussed with Ms. Anthony neurologist - Dr. Gil Chester - will repeat EEG today. Past Medical History/Comorbidities:   Ms. Rebeka Hoffman  has a past medical history of A-fib ([address] Acquired hypothyroidism; Acute CVA (cerebrovascular accident) ([address] (6/21/2014); Acute lacunar infarction [facility]); Anxiety state, unspecified; Aortic valve stenosis, congenital (2/19/2016); CAD (coronary artery disease); Candida Esophagitis (2/11/2010); Chronic kidney disease; Chronic pain; Depression; Dermatomyositis ([address] Duodenal ulcer; Dysphagia (2/19/2016); Embolism and thrombosis of unspecified artery ([address] (9/12/2014); Fibromyalgia; History of anemia (03/12/2012); History of drug overdose (09/2016); History of encephalopathy (02/04/2010); History of fracture of hip (03/10/2012); History of mastectomy; Hypertension; Immunosuppressed status ([address] (6/20/2014); Long term (current) use of anticoagulants; Malignant tumor of colon ([address] Osteoarthritis; Other ill-defined cerebrovascular disease; Pain in joint, pelvic region and thigh; Personal history of fall; Postmenopausal atrophic vaginitis; Primary localized osteoarthrosis, pelvic region and thigh; PUD (peptic ulcer disease) (2009); PVD (peripheral vascular disease) ([address] Reflux esophagitis; S/P AVR (aortic valve replacement); Seasonal allergic rhinitis due to pollen; Sleep disturbance; Symptomatic menopausal or female climacteric states; Thromboembolus ([address] (2007); Unspecified disorder of liver; and Urticaria.   Ms. Rebeka Hoffman has a past surgical history that includes hysterectomy (); tonsillectomy; other surgical; carpal tunnel release (Right, ); abdomen surgery proc unlisted (); colonoscopy (13); colonoscopy (); cataract removal (Right, 3/20/2014); tumor removal (); mastectomy (Bilateral, ); tonsil and adenoidectomy; aortic valve replacement (); aortic valve replacement; cholecystectomy; total hip arthroplasty (Right, 2012, 3/11/12, 2013); revise total hip replacement (Right, 13); and acl reconstruction (Left, ). Social History/Living Environment:   Home Environment: Independent living ([facility])  # Steps to Enter: 0  One/Two Story Residence: One story  Living Alone: Yes  Support Systems: Child(adan), Home care staff, Friends \ neighbors  Patient Expects to be Discharged to[de-identified] Assisted living  Current DME Used/Available at Home: Grab bars, Commode, bedside, Cane, straight, Shower chair, Walker, rollator, Other (comment) ([employer])  Tub or Shower Type: Tub/Shower combination  Prior Level of Function/Work/Activity:  Pt was living in independent living at [facility] and reports she was independent with all self care tasks, used a rollator, went to the dining room for most of her meals, used the facility bus to go out in community and to get groceries. Has small kitchen in [facility] and prepared simple meals. Pt reports son Germania Beatty in [facility], but \"he's not much help to me. \"  Spouse  5 yrs ago. Dominant Side:         RIGHT   Number of Personal Factors/Comorbidities that affect the Plan of Care: Extensive review of physical, cognitive, and psychosocial performance (3+):  HIGH COMPLEXITY   ASSESSMENT OF OCCUPATIONAL PERFORMANCE[de-identified]   Activities of Daily Living:          Toileting, handwashing, grooming, dressing  Basic ADLs (From Assessment) Complex ADLs (From Assessment)   Basic ADL  Feeding: Setup (ate all of breakfast tray)  Oral Facial Hygiene/Grooming: Minimum assistance  Bathing: Minimum assistance  Upper Body Dressing: Minimum assistance  Lower Body Dressing: Minimum assistance  Toileting: Minimum assistance (wiped after loose stool) Instrumental ADL  Meal Preparation: Total assistance (goes to DR for most meals)  Homemaking: Total assistance (family assists)  Medication Management: Setup  Financial Management: Supervision (family assists)   Grooming/Bathing/Dressing Activities of Daily Living   Grooming  Grooming Assistance: Supervision/set up  Washing Face: Supervision/set-up  Washing Hands: Independent  Brushing Teeth: Supervision/set-up  Brushing/Combing Hair: Independent Cognitive Retraining  Safety/Judgement: Awareness of environment; Fall prevention   Upper Body Bathing  Bathing Assistance: Supervision/set-up  Position Performed: Seated in chair     Lower Body Bathing  Bathing Assistance: Contact guard assistance  Perineal  : Contact guard assistance  Position Performed: Standing  Lower Body : Supervision/set-up  Position Performed: Seated edge of bed             Bed/Mat Mobility  Sit to Stand: Contact guard assistance;Stand-by asssistance          Most Recent Physical Functioning:   Gross Assessment:                  Posture:  Posture (WDL): Within defined limits  Balance:  Sitting - Static: Good (unsupported)  Sitting - Dynamic: Good (unsupported)  Standing - Static: Good  Standing - Dynamic : Fair Bed Mobility:     Wheelchair Mobility:     Transfers:  Sit to Stand: Contact guard assistance;Stand-by asssistance  Stand to Sit: Contact guard assistance;Stand-by asssistance           Patient Vitals for the past 6 hrs:   BP BP Patient Position SpO2 Pulse   07/03/17 0733 141/66 At rest 96 % 76   07/03/17 1126 116/78 - 96 % 94        Mental Status  Neurologic State: Alert  Orientation Level: Oriented X4  Cognition: Appropriate for age attention/concentration, Appropriate decision making  Perception: Appears intact  Perseveration: No perseveration noted  Safety/Judgement: Awareness of environment, Fall prevention                               Physical Skills Involved:  1. Range of Motion  2. Balance  3. Strength  4. Activity Tolerance Cognitive Skills Affected (resulting in the inability to perform in a timely and safe manner):  1. Perception  2. Executive Function  3. Immediate Memory  4. Short Term Recall  5. Long Term Memory  6. Divided Attention  7. Comprehension Psychosocial Skills Affected:  1. Habits/Routines  2. Environmental Adaptation  3. Social Interaction  4. Emotional Regulation  5. Self-Awareness  6. Awareness of Others  7. Social Roles   Number of elements that affect the Plan of Care: 5+:  HIGH COMPLEXITY   CLINICAL DECISION MAKIN [facility] Mobility Inpatient Short Form  How much help from another person does the patient currently need. .. Total A Lot A Little None   1. Putting on and taking off regular lower body clothing?   [ ] 1   [ ] 2   [X] 3   [ ] 4   2. Bathing (including washing, rinsing, drying)? [ ] 1   [ ] 2   [X] 3   [ ] 4   3. Toileting, which includes using toilet, bedpan or urinal?   [ ] 1   [ ] 2   [X] 3   [ ] 4   4. Putting on and taking off regular upper body clothing?   [ ] 1   [ ] 2   [X] 3   [ ] 4   5. Taking care of personal grooming such as brushing teeth? [ ] 1   [ ] 2   [X] 3   [ ] 4   6. Eating meals? [ ] 1   [ ] 2   [ ] 3   [X] 4   © , Trustees of [address] [place] [id number], under license to [employer]. All rights reserved    Score:  Initial: 19, completed 2017 Most Recent: X (Date: -- )     Interpretation of Tool:  Represents activities that are increasingly more difficult (i.e. Bed mobility, Transfers, Gait).        Score 24 23 22-20 19-15 14-10 9-7 6       Modifier CH CI CJ CK CL CM CN         · Self Care:               - CURRENT STATUS:    CK - 40%-59% impaired, limited or restricted               - GOAL STATUS:           CI - 1%-19% impaired, limited or restricted               - D/C STATUS:                       ---------------To be determined---------------  [place] / Product Type: Medicare /       Medical Necessity:     · Patient demonstrates good rehab potential due to higher previous functional level. Reason for Services/Other Comments:  · Patient continues to require skilled intervention due to s/p above and decreased ADLs and mobility. Use of outcome tool(s) and clinical judgement create a POC that gives a: MODERATE COMPLEXITY             TREATMENT:   (In addition to Assessment/Re-Assessment sessions the following treatments were rendered)      Pre-treatment Symptoms/Complaints:    Pain: Initial:   Pain Intensity 1: 0  Post Session:  same      Self Care: (38 minutes): Procedure(s) (per grid) utilized to improve and/or restore self-care/home management as related to dressing, bathing, toileting and grooming. Required minimal verbal cueing to facilitate activities of daily living skills and CGA to provide steadying assistance when pt completes josé miguel care in standing. Braces/Orthotics/Lines/Etc:   · O2 device room air  Treatment/Session Assessment:    · Response to Treatment:  Tolerated well w/o complications   · Interdisciplinary Collaboration:  · Physical Therapist, Occupational Therapist, Registered Nurse and Certified Nursing Assistant/Patient Care Technician  · After treatment position/precautions:  · with PT for tx session  · Compliance with Program/Exercises: Compliant at all times today  · Recommendations/Intent for next treatment session: \"Next visit will focus on advancements to more challenging activities and reduction in assistance provided\".     OT Patient Time In/Time Out  Time In: 1007  Time Out: [address]

## 2017-07-03 NOTE — DISCHARGE SUMMARY
Hospitalist Discharge Summary   Patient ID:  Vale Minor  [id number]  16 y.o.  1945  Admit date: 6/26/2017  3:05 PM  Discharge date and time: 7/3/2017  Attending: Indira Maier MD  PCP:  Lorena Pendleton MD  Treatment Team: Attending Provider: Delaney Redmond MD; Utilization Review: Lakshmi Calderon RN; Care Manager: Mao Jhaveri  Principal Diagnosis Acute encephalopathy   Principal Problem:    Acute encephalopathy (11/9/2016)    Active Problems:    S/P AVR (aortic valve replacement) (2/4/2010)      HTN (hypertension)--Dr. Gera Armas (1/17/2013)      GERD (gastroesophageal reflux disease) (1/17/2013)      PUD (peptic ulcer disease) (1/17/2013)      Immunosuppressed status ([facility] (6/20/2014)      Long term (current) use of anticoagulants ()      Seizure disorder ([facility] (12/13/2016)      Hypokalemia (6/28/2017)      Hypomagnesemia (6/28/2017)      Acute metabolic encephalopathy (2/34/1968)      UTI (urinary tract infection) (6/29/2017)       * Admission Diagnoses: Acute encephalopathy  Acute encephalopathy  Acute metabolic encephalopathy  * Discharge Diagnoses:    Hospital Problems as of 7/3/2017  Date Reviewed: 6/28/2017          Codes Class Noted - Resolved POA    UTI (urinary tract infection) ICD-10-CM: N39.0  ICD-9-CM: 599.0  6/29/2017 - Present Yes        Hypokalemia ICD-10-CM: E87.6  ICD-9-CM: 276.8  6/28/2017 - Present No        Hypomagnesemia ICD-10-CM: E83.42  ICD-9-CM: 275.2  6/28/2017 - Present Clinically Undetermined        Acute metabolic encephalopathy UOB-72-HQ: G93.41  ICD-9-CM: 348.31  6/28/2017 - Present Unknown        Se[facility] ([facility] ICD-10-CM: G40.909  ICD-9-CM: 345.90  12/13/2016 - Present Yes        * (Principal)Acute encephalopathy ICD-10-CM: G93.40  ICD-9-CM: 348.30  11/9/2016 - Present Yes        Long term (current) use of anticoagulants ICD-10-CM: Z79.01  ICD-9-CM: V58.61  Unknown - Present Yes        Immunosuppressed status ([facility] ICD-10-CM: D89.9  ICD-9-CM: 279.9  6/20/2014 - Present Yes        HTN (hypertension)--Dr. Michael Pizano ICD-10-CM: I10  ICD-9-CM: 401.9  1/17/2013 - Present Yes        GERD (gastroesophageal reflux disease) ICD-10-CM: K21.9  ICD-9-CM: 530.81  1/17/2013 - Present Yes        PUD (peptic ulcer disease) ICD-10-CM: K27.9  ICD-9-CM: 533.90  1/17/2013 - Present Yes        S/P AVR (aortic valve replacement) (Chronic) ICD-10-CM: Z95.2  ICD-9-CM: V43.3  2/4/2010 - Present Yes                Hospital Course:  Please refer to the admission H&P for details of presentation. In summary, Ms. Amelia Gama is a 80 y.o. female  with a PMHx of CAD, s/p AVR, pAFib on chronic  Coumadin, Hx of CVA, Hx of DVT, HTN,  Hypothyroidism, dermatomyositis with chronic steroid use with osteoporosis, Hx of Jason ulcers with Endo clip x2  On April 2017 at [facility],  who was admitted on 6/26/2017 with acute encephalopathy and falls. CT head on admission negative for acute abnormalities. Changed to inpatient status on 6/28/17. Extensive review of prior medical records done - noted recent hx of Jason ulcers, hx of seizures with last EEG on 3/28/17 that showed intermittent paroxysmal delta rhythms compatible with generalized epilepsy of primary generalized paroxysm - on on Keppra that was changed to Depakote due to recent hypotensive events and multiple falls. EEG w/o seizure. Tele-neurology recommended to stop Depakote and continue Keppra. Slowly her mental status was back to baseline. TSH:10.80, RPR:nonreactive, Vitamin B12:2510.  Urine culture positive for Klebsiella and was started on IV Rocephin that was changed to PO Vantin  for full treatment course. TSH was 10.80 and Levothyroxine was increased to 125 mcg PO daily - will need repeat TSH in 2-3 weeks. crays of right elbow suspicious of fracture, although CT was withouth fracture. At admission her sulfamerazine was placed on hold and Ms. Lane Arciniega started to have diarrhea  That resolved when her home medications were resumed. Stool culture negative and CDiff was not run due to  Not meeting criteria. Coumadin was resumed and at discharged her INR is 2.9. Advised to take  2 mg of Coumadin tonight and resume a regimen of 2 mg Coumadin on Tues, Thurs, Sat and 4 mg all other days. Advised to repeat CBC, INR and Mg level in 2 days. Ms. Kenisha Glass was discharged to SNF with plans for long term placement in a stable clinical status. Advised to follow-up with PCP in 3-5 days, gastroenterology in 1 week, cardiology in 1 week and neurology in 1-2 weeks.                Diagnostic Study/Procedure results summary copied from within [facility] EMR:      Labs: Results:       Chemistry Recent Labs      07/03/17   0954  07/02/17   0615  07/01/17   0714   GLU  94  81  89   NA  141  145  146*   K  4.0  3.8  2.8*   CL  104  111*  109*   CO2  29  26  27   BUN  11  11  10   CREA  0.65  0.51*  0.52*   CA  8.9  8.1*  9.0   AGAP  8  8  10      CBC w/Diff Recent Labs      07/03/17   0954  07/02/17   0615  07/01/17   0714   WBC  6.6  6.7  7.5   RBC  4.53  3.98*  4.47   HGB  14.2  12.5  14.0   HCT  43.2  37.8  41.4   PLT  157  112*  127*          Coagulation Recent Labs      07/03/17   0954  07/02/17   1400   PTP  31.3*  32.1*   INR  2.9*  2.9*                   Thyroid Studies Lab Results   Component Value Date/Time    TSH 10.800 06/28/2017 06:16 AM          7/1/2017  8:03 AM - [facility]       Component Results      Component Value Flag Ref Range Units Status     Special Requests: NO SPECIAL REQUESTS     Final     Culture result: >100,000   COLONIES/mL   KLEBSIELLA PNEUMONIAE    (A)    Final     Culture result:      Final     <10,000   COLONIES/mL   NORMAL SKIN JESSICA ISOLATED          Culture & Susceptibility      KLEBSIELLA PNEUMONIAE      Antibiotic Sensitivity ROOPA Unit Status     Ampicillin/sulbactam ($) Susceptible 8/4 ug/mL Final     Method: ROOPA     Aztreonam ($$$$) Susceptible <=1 ug/mL Final     Method: ROOPA     Cefazolin ($) Susceptible 2 ug/mL Final     Method: ROOPA     Cefepime ($$) Susceptible <=0.5 ug/mL Final     Method: ROOPA     Cefoxitin Susceptible <=4 ug/mL Final     Method: ROOPA     Ceftriaxone ($) Susceptible <=0.5 ug/mL Final     Method: ROOPA     Ciprofloxacin ($) Susceptible <=0.5 ug/mL Final     Method: ROOPA     Gentamicin ($) Susceptible 1 ug/mL Final     Method: ROOPA     Nitrofurantoin Susceptible 32 ug/mL Final     Method: ROOPA     Piperacillin/Tazobac ($) Susceptible 4/4 ug/mL Final     Method: ROOPA     Tobramycin ($) Susceptible 1 ug/mL Final     Method: ROOPA     Trimeth-Sulfamethoxa Susceptible <=0.5/9.5 ug/mL Final     Method: ROOPA            Imaging /Procedures /Studies:      CT Results  (Last 48 hours)               06/26/17 1657  CT HEAD WO CONT Final result    Impression:  IMPRESSION:       1. No acute intracranial findings. 2. Moderate cerebral volume loss. 3. Chronic left maxillary sinus disease. Narrative:  HEAD CT WITHOUT CONTRAST  6/26/2017        HISTORY:   AMS  ; history of head trauma. TECHNIQUE: Noncontrast axial images were obtained through the brain. All CT   scans at this facility used dose modulation, interactive reconstruction and/or   weight based dosing when appropriate to reduce radiation dose to as low as   reasonably achievable. COMPARISON: November 9, 2016       FINDINGS: There is no acute intracranial hemorrhage, significant mass effect or   CT evidence of acute large artery territorial infarction. Please note that a   hyperacute infarct or small vessel infarct may not be apparent on initial CT   imaging. Moderate cerebral volume loss is present. A few areas of low attenuation are   present in the supratentorial white matter, suggestive of chronic small vessel   ischemic disease. There is no hydrocephalus , intra-axial mass or abnormal extra-axial fluid   collection. There are no displaced skull fractures. The left maxillary sinus is   partially opacified. 06/26/17 1657  CT SPINE CERV WO CONT Final result    Impression:  IMPRESSION:       1. No acute findings in the cervical spine. 2. Multilevel facet degenerative change and degenerative disc disease with   foraminal stenosis present as described. Narrative:  CT CERVICAL SPINE WITHOUT CONTRAST 6/26/2017       HISTORY: Trauma. Altered mental status. TECHNIQUE: Noncontrast axial images were obtained from the craniocervical   junction through T3. Multiplanar reformatted images were generated. All CT   scans at this facility used dose modulation, iterative reconstruction and/or   weight based dosing when appropriate to reduce radiation dose to as low as   reasonably achievable. COMPARISON: November 9, 2016       FINDINGS: The cervical vertebrae are normal in height and alignment. The   articular facets overlap appropriately. Multilevel facet degenerative change is   present. Degenerative disc disease is present at C3-C4, C4-C5 and C5-C6 with   foraminal stenosis at these levels. There is no prevertebral soft tissue   swelling. Axial images demonstrate no displaced cervical spine fracture. Included portions   of the lung apices are clear. EEG 3/28/17: Technique: This EEG was performed using the [employer] 10/20 System. An EKG was monitored. The length of the recording was 30 minutes. State of Consciousness: awake, drowsy, asleep and awake,drowsy and asleep . Stage II. Description:     10 / 20 IEP 21 channel [employer] equipment bipolar / referential recordings for 30 + minutes using standard montages and technique. Background:  Normal.       8.5 hx 50 uV symmetric. Rhythms:  Normal. Good  Except an intermittent 2.5 Hz high amplitude  To 3 Hz delta activity lasting from 3 to 5 seconds during drowse. Epileptiform:  None. Symmetry :  Normal.     Alpha:  8 hz = normal amount. Normal attenuation with eye opening.      Beta:   13 + Hz and good amplitudes :  Normal amount. Normal symmetry. Theta:   5-7 Hz:  Normal amounts. Delta:   2 - 3 hz:   Normal amounts. Is paroxysm rhythm delta rhythm as noted. EK NSR     Activation Procedures:  Hyperventlation: *none/age   Photic Stimulation: done    Interpretation: This is an abnormal EEG due to the presence of intermittent paroxysmal delta rhythms compatible with generalized epilepsy or primary generalzied paroxysms. Correlation and recommendations[de-identified]   Medication for Anti epileptic therapy is recommended such as divalproex treatment. Discharge Exam:  Visit Vitals    /78    Pulse 94    Temp 98.3 °F (36.8 °C)    Resp 18    Ht 5' 3\" (1.6 m)    Wt 57.2 kg (126 lb)    SpO2 96%    BMI 22.32 kg/m2     Physical Exam:  General:         Awake, alert. No acute distress. Afebrile. Cooperative. Looks chronically debilitated  HEENT:               NCAT. No obvious deformity. Nares normal. No drainage. Hematoma on mandibular castillo  Lungs:                  CTABL. No wheezing/rhonchi/rales  Cardiovascular:   RRR. No m/r/g. No pedal edema b/l. +2 PT/DT pulses b/l. Abdomen:       S/nt/nd. Bowel sounds normal. .   Skin:         Hematoma on mandibular area and right elbow. Not Jaundiced  Neurologic:    AAOX3. Deepak Rosenthal CN II- XII grossly WNL. No gross focal deficit. Heme/Lymph/Immune: No petechiae, echymoses, overt signs of bleeding or lymphadenopathy. Psychiatric:         Euthymic. Good mood        Disposition: SNF  Discharge Condition: stable  Patient Instructions:   Current Discharge Medication List      START taking these medications    Details   Lactobacillus Acidoph & Bulgar (FLORANEX) 1 million cell tab tablet Take 2 Tabs by mouth two (2) times a day. Qty: 60 Tab, Refills: 0      pantoprazole (PROTONIX) 40 mg tablet Take 1 Tab by mouth daily. Qty: 30 Tab, Refills: 0      cefpodoxime (VANTIN) 200 mg tablet Take 1 Tab by mouth every twelve (12) hours for 10 days.   Qty: 20 Tab, Refills: 0      magnesium oxide (MAG-OX) 400 mg tablet Take 1 Tab by mouth daily. Qty: 30 Tab, Refills: 0         CONTINUE these medications which have CHANGED    Details   levothyroxine (SYNTHROID) 125 mcg tablet Take 1 Tab by mouth Daily (before breakfast). Qty: 21 Tab, Refills: 0      !! warfarin (COUMADIN) 2 mg tablet 2 mg Tues, Thurs, Sat and 4 mg all other days. Starting 7/4  Qty: 30 Tab, Refills: 0      !! warfarin (COUMADIN) 2 mg tablet Take 1 Tab by mouth once for 1 dose. On 7/3/2017 QHS  Qty: 1 Tab, Refills: 0       !! - Potential duplicate medications found. Please discuss with provider. CONTINUE these medications which have NOT CHANGED    Details   diphenoxylate-atropine (LOMOTIL) 2.5-0.025 mg per tablet Take 1 Tab by mouth four (4) times daily as needed for Diarrhea. Max Daily Amount: 4 Tabs. Qty: 28 Tab, Refills: 0      levETIRAcetam (KEPPRA) 500 mg tablet Take 500 mg by mouth two (2) times a day. venlafaxine-SR (EFFEXOR-XR) 75 mg capsule 3 qd  Qty: 90 Cap, Refills: 5      carvedilol (COREG) 6.25 mg tablet Take 1 Tab by mouth two (2) times daily (with meals). Qty: 60 Tab, Refills: 5      baclofen (LIORESAL) 10 mg tablet Take 1 Tab by mouth three (3) times daily. Qty: 90 Tab, Refills: 5      traZODone (DESYREL) 100 mg tablet Take 2 hs prn sleep  Qty: 60 Tab, Refills: 5    Associated Diagnoses: Sleeping difficulty      budesonide-formoterol (SYMBICORT) 80-4.5 mcg/actuation HFAA inhaler Take 2 Puffs by inhalation two (2) times a day. Qty: 1 Inhaler, Refills: 1      albuterol (PROAIR HFA) 90 mcg/actuation inhaler Take 1 Puff by inhalation every four (4) hours as needed for Wheezing or Shortness of Breath. Qty: 1 Inhaler, Refills: 0      dicyclomine (BENTYL) 10 mg capsule Take 10 mg by mouth daily as needed. fluconazole (DIFLUCAN) 200 mg tablet Take 1 Tab by mouth daily. FDA advises cautious prescribing of oral fluconazole in pregnancy.   Qty: 30 Tab, Refills: 3      cyanocobalamin 1,000 mcg tablet Take 1,000 mcg by mouth daily. acetaminophen (TYLENOL) 500 mg tablet Take 500 mg by mouth every six (6) hours as needed for Pain. calcium-cholecalciferol, d3, (CALCIUM 600 + D) 600-125 mg-unit tab Take  by mouth two (2) times a day. ferrous sulfate (FEOSOL) 325 mg (65 mg iron) tablet Take  by mouth Daily (before breakfast). sulfaSALAzine (AZULFIDINE) 500 mg tablet Take 1,000 mg by mouth three (3) times daily as needed. folic acid (FOLVITE) 1 mg tablet Take 1 mg by mouth daily. predniSONE (DELTASONE) 5 mg tablet Take 5 mg by mouth daily. Taking as of 12.24.14         STOP taking these medications       ibuprofen (MOTRIN) 200 mg tablet Comments:   Reason for Stopping:               Activity: PT/OT Eval and Treat  Diet: Cardiac Diet  Wound Care: None needed      Full Code   Surrogate decision maker: discussed with Ms. Vargas Rachell Trieed to reach her son Mr Michael Schroeder ([phone]) - unable.  Care team   Pneumonia and flu vaccine to be administered at discharge per hospital protocol     Follow-up  ·   PCP in 3-5 days  · Gastroenterology in 1 week  · Neurology in 1-2 weeks  · Cardiology in 1 week  · Needs CBC, Mg, and INR in 2 days    Time spent to discharge patient 36 minutes  Signed:  Sandra Grover MD  7/3/2017

## 2017-07-03 NOTE — PROGRESS NOTES
Problem: Mobility Impaired (Adult and Pediatric)  Goal: *Acute Goals and Plan of Care (Insert Text)  STG:  (1.)Ms. Debora Walls will move from supine to sit and sit to supine , scoot up and down and roll side to side with INDEPENDENT within 7 day(s). (2.)Ms. Debora Walls will transfer from bed to chair and chair to bed with Supervision using the least restrictive device within 7 day(s). (3.)Ms. Debora Walls will ambulate with SUPERVISION for 150 feet with the least restrictive device within 7 day(s). (4.)Ms Debora Walls will follow commands for BLE AROM exercises and gait/balance activities 75% time within 7 days       PHYSICAL THERAPY: Daily Note, Treatment Day: 1st and AM 7/3/2017  INPATIENT: Hospital Day: 8  [place] / [place] / Product Type: Medicare /      NAME/AGE/GENDER: Nico Tolentino is a 80 y.o. female     PRIMARY DIAGNOSIS: Acute encephalopathy  Acute encephalopathy  Acute metabolic encephalopathy Acute encephalopathy Acute encephalopathy        ICD-10: Treatment Diagnosis:       · Generalized Muscle Weakness (M62.81)  · Difficulty in walking, Not elsewhere classified (R26.2)  · Other abnormalities of gait and mobility (R26.89)  · History of falling (Z91.81)   Precaution/Allergies:  Latex; Bee sting [sting, bee]; and Adhesive       ASSESSMENT:      Ms. Debora Walls is sitting up in chair at sink finishing getting cleaned up with OT. Pt is agreeable to PT treatment this morning. Pt is CGA-SBA with sit to stand to RW and ambulated 100 ft X 2 requiring 4-5 standing rest breaks during gait. Pt returned to room and seated in bedside chair. When in room pt left RW and ambulated with furniture walking to chair. PT educated pt on safety and use of RW and demonstrated proper way to use RW to get to chair in room and that these behaviors are what could lead to future falls. Pt reports understanding. Pt left sitting up in chair with all needs met and within reach. Pt making progress towards goals.    This section established at most recent assessment   PROBLEM LIST (Impairments causing functional limitations):  1. Decreased Strength  2. Decreased ADL/Functional Activities  3. Decreased Transfer Abilities  4. Decreased Ambulation Ability/Technique  5. Decreased Balance  6. Decreased Activity Tolerance  7. Increased Fatigue  8. Decreased Cognition    INTERVENTIONS PLANNED: (Benefits and precautions of physical therapy have been discussed with the patient.)  1. Bed Mobility  2. Gait Training  3. Therapeutic Activites  4. Therapeutic Exercise/Strengthening  5. Transfer Training      TREATMENT PLAN: Frequency/Duration: 3 times a week for duration of hospital stay  Rehabilitation Potential For Stated Goals: GOOD      RECOMMENDED REHABILITATION/EQUIPMENT: (at time of discharge pending progress): Continue Skilled Therapy and rehab                   HISTORY:   History of Present Injury/Illness (Reason for Referral):  Patient is a 80 yof with a hx of cad, afib on coumadin, ckd who presents from [facility] with acute encephalopathy and falls. Pt is unresponsive and unable to aid with hx. CT head on admission is negative for acute abnormalities. Pt has several sedative medications listed on her med rec. ABG is pending. Past Medical History/Comorbidities:   Ms. Lashonda Nielsen  has a past medical history of A-fib ([facility] [address] Acquired hypothyroidism; Acute CVA (cerebrovascular accident) ([facility] [address] (6/21/2014); Acute lacunar infarction [facility]); Anxiety state, unspecified; Aortic valve stenosis, congenital (2/19/2016); CAD (coronary artery disease); Candida Esophagitis (2/11/2010); Chronic kidney disease; Chronic pain; Depression; Dermatomyositis ([facility] [address] Duodenal ulcer; Dysphagia (2/19/2016); Embolism and thrombosis of unspecified artery ([facility] [address] (9/12/2014); Fibromyalgia; History of anemia (03/12/2012); History of drug overdose (09/2016); History of encephalopathy (02/04/2010); History of fracture of hip (03/10/2012); History of mastectomy;  Hypertension; Immunosuppressed status ([facility] [address] (6/20/2014); Long term (current) use of anticoagulants; Malignant tumor of colon ([facility] [address] Osteoarthritis; Other ill-defined cerebrovascular disease; Pain in joint, pelvic region and thigh; Personal history of fall; Postmenopausal atrophic vaginitis; Primary localized osteoarthrosis, pelvic region and thigh; PUD (peptic ulcer disease) (2009); PVD (peripheral vascular disease) ([facility] [address] Reflux esophagitis; S/P AVR (aortic valve replacement); Seasonal allergic rhinitis due to pollen; Sleep disturbance; Symptomatic menopausal or female climacteric states; Thromboembolus ([facility] [address] (2007); Unspecified disorder of liver; and Urticaria. Ms. Jolie Rosales  has a past surgical history that includes hysterectomy (1975); tonsillectomy; other surgical; carpal tunnel release (Right, 1995); abdomen surgery proc unlisted (1990); colonoscopy (9/25/13); colonoscopy (2010); cataract removal (Right, 3/20/2014); tumor removal (1986); mastectomy (Bilateral, 2000); tonsil and adenoidectomy; aortic valve replacement (2004); aortic valve replacement; cholecystectomy; total hip arthroplasty (Right, 12/2012, 3/11/12, 2/2013); revise total hip replacement (Right, 1/17/13); and acl reconstruction (Left, 1979). Social History/Living Environment:   Home Environment: Independent living ([facility])  # Steps to Enter: 0  One/Two Story Residence: One story  Living Alone: Yes  Support Systems: Child(adan), Home care staff, Friends \ neighbors  Patient Expects to be Discharged to[de-identified] Assisted living  Current DME Used/Available at Home: Grab bars, Commode, bedside, Cane, straight, Shower chair, Walker, rollator, Other (comment) ([employer])  Tub or Shower Type: Tub/Shower combination  Prior Level of Function/Work/Activity:  Unknown with no family present to provide update of prior level of function.        Number of Personal Factors/Comorbidities that affect the Plan of Care: 3+: HIGH COMPLEXITY   EXAMINATION:   Most Recent Physical Functioning:   Gross Assessment:  Strength: Generally decreased, functional  Coordination: Generally decreased, functional               Posture:     Balance:  Sitting - Static: Good (unsupported)  Sitting - Dynamic: Good (unsupported)  Standing - Static: Good  Standing - Dynamic : Fair Bed Mobility:     Wheelchair Mobility:     Transfers:  Sit to Stand: Contact guard assistance;Stand-by asssistance  Stand to Sit: Contact guard assistance;Stand-by asssistance  Gait:     Base of Support: Narrowed  Speed/Julia: Fluctuations  Step Length: Left shortened;Right shortened  Gait Abnormalities: Decreased step clearance;Trunk sway increased  Distance (ft): 100 Feet (ft) (X2)  Assistive Device: Walker, rolling  Ambulation - Level of Assistance: Contact guard assistance  Interventions: Safety awareness training; Tactile cues; Verbal cues       Body Structures Involved:  1. Metabolic Body Functions Affected:  1. Movement Related  2. Metobolic/Endocrine Activities and Participation Affected:  1. Learning and Applying Knowledge  2. General Tasks and Demands  3. Communication  4. Self Care   Number of elements that affect the Plan of Care: 4+: HIGH COMPLEXITY   CLINICAL PRESENTATION:   Presentation: Evolving clinical presentation with changing clinical characteristics: MODERATE COMPLEXITY   CLINICAL DECISION MAKIN [facility] Inpatient Short Form  How much difficulty does the patient currently have. .. Unable A Lot A Little None   1. Turning over in bed (including adjusting bedclothes, sheets and blankets)? [ ] 1   [ ] 2   [ ] 3   [X] 4   2. Sitting down on and standing up from a chair with arms ( e.g., wheelchair, bedside commode, etc.)   [ ] 1   [ ] 2   [X] 3   [ ] 4   3. Moving from lying on back to sitting on the side of the bed? [ ] 1   [ ] 2   [X] 3   [ ] 4   How much help from another person does the patient currently need. .. Total A Lot A Little None   4.   Moving to and from a bed to a chair (including a wheelchair)? [ ] 1   [ ] 2   [X] 3   [ ] 4   5. Need to walk in hospital room? [ ] 1   [X] 2   [ ] 3   [ ] 4   6. Climbing 3-5 steps with a railing? [X] 1   [ ] 2   [ ] 3   [ ] 4   © 2007, Trustees of [address] [place] [address] [address] [address], under license to [employer]. All rights reserved    Score:  Initial: 16 Most Recent: X (Date: -- )     Interpretation of Tool:  Represents activities that are increasingly more difficult (i.e. Bed mobility, Transfers, Gait). Score 24 23 22-20 19-15 14-10 9-7 6       Modifier CH CI CJ CK CL CM CN         · Mobility - Walking and Moving Around:               - CURRENT STATUS:    CK - 40%-59% impaired, limited or restricted               - GOAL STATUS:           CI - 1%-19% impaired, limited or restricted               - D/C STATUS:                       ---------------To be determined---------------  [place] / [place] B / Product Type: Medicare /       Medical Necessity:     · Patient is expected to demonstrate progress in strength, range of motion and functional technique to decrease assistance required with transfers and gait, increase independence with basic mobility skills and improve safety during sitting and standing activities. Reason for Services/Other Comments:  · Patient continues to require skilled intervention due to medical complications and patient unable to attend/participate in therapy as expected secondary to cognitive status. Use of outcome tool(s) and clinical judgement create a POC that gives a: Questionable prediction of patient's progress: MODERATE COMPLEXITY                 TREATMENT:   (In addition to Assessment/Re-Assessment sessions the following treatments were rendered)   Pre-treatment Symptoms/Complaints:  Motivated to work with PT  Pain: Initial: 0/10  Pain Intensity 1: 0  Post Session:  0/10      Therapeutic Activity: (    12 minutes):   Therapeutic activities including Chair transfers and Ambulation on level ground to improve mobility. Required min-mod Safety awareness training; Tactile cues; Verbal cues to promote motor control of bilateral, upper extremity(s), lower extremity(s). Seated Date:  6/29 Date:   Date:     Activity/Exercise Parameters Parameters Parameters   AP 20     LAQ 20     Marching 20     adductior squeezes 20                           Therapeutic Exercise: (  ):  Exercises per grid below to improve strength. Required minimal visual and verbal cues to promote proper body mechanics. Progressed repetitions as indicated. Braces/Orthotics/Lines/Etc:   · IV  · O2 Device: Room air  Treatment/Session Assessment:    · Response to Treatment: Pt participated well with PT ambulating 100 ft X 2 with RW CGA. Pt required 4-5 standing rest breaks   · Interdisciplinary Collaboration:  · Physical Therapist, Occupational Therapist and Registered Nurse  · After treatment position/precautions:  · Up in chair, Bed/Chair-wheels locked, Bed in low position, Call light within reach and RN notified  · Compliance with Program/Exercises: Will assess as treatment progresses. · Recommendations/Intent for next treatment session: \"Next visit will focus on advancements to more challenging activities and reduction in assistance provided\".   Total Treatment Duration:PT Patient Time In/Time Out  Time In: 1046  Time Out: [address]

## 2017-07-03 NOTE — PROGRESS NOTES
This note will not be viewable in [address] Care Needs: RNCM received call from pt stating she is at [facility] after staying at [facility]. Pt states she was confused because she got her medications mixed up. Per pt, she will be going to long term when dc'd from [facility]. She is concerned about the cost of [facility]. She says her son Kay Talley who lives in [place], [place] is looking into getting funds through [place], and is working with [employer] to locate best long term to meet pt's needs. RNCM offered encouragement and prayer, and sent text message to pt with contact number for [address] [facility] as requested by pt. RNCM offered to call her son, however she asked that I not do that at this time. RNCM notified Melanie mSith of pt's admit to [facility]. Per Venkat Samuel will schedule f/u appt with pt's PCP. RNCM provided Lan Dill with all currently scheduled upcoming appts.

## 2017-07-03 NOTE — PROGRESS NOTES
Warfarin dosing per pharmacist    Jase Hurley is a 80 y.o. female. Height: 5' 3\" (160 cm)    Weight: 57.2 kg (126 lb)    Indication:  A. Fib, Hx AVR    Goal INR:  2-3 per anticoag notes    Home dose:  2 mg Tues, Sat; 4 mg all other days    Risk factors or significant drug interactions:  levothyroxine    Other anticoagulants:  none    Daily Monitoring  Date  INR     Warfarin dose HGB              Notes  6/27  2.2 (6/26) 2 mg  13.1    6/28  1.5  4 mg  13.0  6/29  1.3  4 mg  ---  6/30  1.4  4 mg  14.4  7/1  2.5  1 mg  14.0  7/2  3.2/2.9  1 mg  12.5  7/3  2.9  2 mg  14.2    Pharmacy consulted to manage warfarin on 6/27/17 by Dr. Miguel Otero    80 YOF with a history of A. Fib on warfarin as an outpatient. INR now 2.9. Adjust warfarin to 2 mg tonight and follow daily INR. Should be reasonable for patient to resume a regimen of 2 mg Tues, Thurs, Sat and 4 mg all other days after dose tonight. Pharmacy will continue to follow. Please call with any questions.      Thank you,  Shamika Donato, PharmD  Clinical Pharmacist  [phone]

## 2017-07-04 ENCOUNTER — PATIENT OUTREACH (OUTPATIENT)
Dept: CASE MANAGEMENT | Age: 72
End: 2017-07-04

## 2017-07-04 NOTE — PROGRESS NOTES
Ple ase note RINKU outreach not completed by this writer, as she is engaged with Anthony Mojica RN CM, who has completed outreach call on yesterday, and patient also resides at [facility]. Ashok Ponce LPN/ Care Coordinator  [address] [place]  / [place], [address] [facility]  [email or web address] note will not be viewable in [address]

## 2017-07-10 PROBLEM — G25.0 FAMILIAL TREMOR: Status: ACTIVE | Noted: 2017-07-10

## 2017-07-12 ENCOUNTER — PATIENT OUTREACH (OUTPATIENT)
Dept: CASE MANAGEMENT | Age: 72
End: 2017-07-12

## 2017-07-12 NOTE — PROGRESS NOTES
This note will not be viewable in [address] RNCM received call from pt stating she is planning move from SNF on 7/18 or 7/19 to [facility]. Pt plans for [facility] to manage her Coumadin, and Dr. Ethel Wiggins will remain as PCP. Chantell Burnett will be away 7/13/17 - 7/19/17, will f/u with pt on 7/21/17.

## 2017-07-21 ENCOUNTER — PATIENT OUTREACH (OUTPATIENT)
Dept: CASE MANAGEMENT | Age: 72
End: 2017-07-21

## 2017-07-21 NOTE — PROGRESS NOTES
This note will not be viewable in [address] RNCM attempted to reach pt for f/u CCM services, pt recently transitioned to senior care from SNF. Will continue attempts to reach pt.

## 2017-07-21 NOTE — PROGRESS NOTES
This note will not be viewable in [address] Care Needs: RNCM received call from pt stating she is at [facility]. She reports she is depressed, and that she hasnt had her Coumadin in 3days. RNCM called [facility] and spoke with Carlo Fee reported to this RNCM that on  7/17/17 pts INR was 4.5 thus Coumadin was hold when pt was dcd from [facility]. RNCM called [facility] and requested to speak with someone who can assist with getting [place] [id number] in [address] to check pts INR. Rep took Emerson contact information and stated she would have someone call me back. RNCM then received call from Meño Borden from [facility] who confirmed pt's coumadin held due to elevated INR. Per Meño Borden pt receiving PT and OT, and Dr. Cristobal Stapleton rounded on pt yesterday at [facility] and will check pt's INR again on Monday 7/24/17. RNCM then called pt back and left vm explaining above, why coumadin was held and that Dr. Cristobal Stapleton will check INR again on Monday. Pt has RNCM contact information for questions or concerns.

## 2017-09-04 ENCOUNTER — PATIENT OUTREACH (OUTPATIENT)
Dept: CASE MANAGEMENT | Age: 72
End: 2017-09-04

## 2017-09-04 NOTE — PROGRESS NOTES
This note will not be viewable in [address] Care Needs: RNCM spoke with pt regarding CCM services. Pt reports she is doing well at the senior living. It has been a big transition for her moving from an independent facility, to now having a room mate. Pt is in good spirits and reports meeting new friends, and doing activities outside of the senior living. She reports her pain is now controlled on low dose hydrocodone 2.5mg with tylenol. Pt states Now Im taking medicine how I need it\". She is doing exercises daily also which helps her coping. RNCM provided verbal encouragement and support. Pt has RNCM for questions or concerns in the future. RNCM will graduate pt at this time as pt medications and INR are being managed now by senior living staff, pain is controlled.

## 2017-10-17 ENCOUNTER — APPOINTMENT (RX ONLY)
Dept: URBAN - METROPOLITAN AREA CLINIC 161 | Facility: CLINIC | Age: 72
Setting detail: DERMATOLOGY
End: 2017-10-17

## 2017-10-17 NOTE — HPI: SKIN LESIONS
Have Your Skin Lesions Been Treated?: not been treated
Is This A New Presentation, Or A Follow-Up?: Skin Lesions
How Severe Is Your Skin Lesion?: mild
Additional History: \\n\\nPatient states she had a bcc on her right cheek , she don't recall the year.

## 2017-11-09 ENCOUNTER — HOSPITAL ENCOUNTER (OUTPATIENT)
Dept: LAB | Age: 72
Discharge: HOME | End: 2017-11-09
Attending: INTERNAL MEDICINE
Payer: MEDICARE

## 2017-11-09 DIAGNOSIS — R06.02 SHORTNESS OF BREATH: ICD-10-CM

## 2017-11-09 DIAGNOSIS — R06.09 DYSPNEA ON EXERTION: ICD-10-CM

## 2017-11-09 LAB
BASOPHILS # BLD: 0 K/UL (ref 0–0.2)
BASOPHILS NFR BLD: 0 % (ref 0–2)
BNP SERPL-MCNC: 56 PG/ML
DIFFERENTIAL METHOD BLD: ABNORMAL
EOSINOPHIL # BLD: 0.1 K/UL (ref 0–0.8)
EOSINOPHIL NFR BLD: 1 % (ref 0.5–7.8)
ERYTHROCYTE [DISTWIDTH] IN BLOOD BY AUTOMATED COUNT: 12.6 % (ref 11.9–14.6)
HCT VFR BLD AUTO: 42.5 % (ref 35.8–46.3)
HGB BLD-MCNC: 13.4 G/DL (ref 11.7–15.4)
LYMPHOCYTES # BLD: 1.3 K/UL (ref 0.5–4.6)
LYMPHOCYTES NFR BLD: 20 % (ref 13–44)
MCH RBC QN AUTO: 31.5 PG (ref 26.1–32.9)
MCHC RBC AUTO-ENTMCNC: 31.5 G/DL (ref 31.4–35)
MCV RBC AUTO: 99.8 FL (ref 79.6–97.8)
MONOCYTES # BLD: 0.5 K/UL (ref 0.1–1.3)
MONOCYTES NFR BLD: 7 % (ref 4–12)
NEUTS SEG # BLD: 4.9 K/UL (ref 1.7–8.2)
NEUTS SEG NFR BLD: 72 % (ref 43–78)
PLATELET # BLD AUTO: 195 K/UL (ref 150–450)
PMV BLD AUTO: 8.8 FL (ref 10.8–14.1)
RBC # BLD AUTO: 4.26 M/UL (ref 4.05–5.25)
WBC # BLD AUTO: 6.8 K/UL (ref 4.3–11.1)

## 2017-11-09 PROCEDURE — 36415 COLL VENOUS BLD VENIPUNCTURE: CPT | Performed by: INTERNAL MEDICINE

## 2017-11-09 PROCEDURE — 83880 ASSAY OF NATRIURETIC PEPTIDE: CPT | Performed by: INTERNAL MEDICINE

## 2017-11-09 PROCEDURE — 85025 COMPLETE CBC W/AUTO DIFF WBC: CPT | Performed by: INTERNAL MEDICINE

## 2017-11-09 NOTE — PROGRESS NOTES
Please call her, CBC normal.  BNP low, no lasix needed now,  Good news. We will wait on the echo and NST and see back. Call PRN for more edema, CP, JOYNER.   Thanks

## 2018-01-03 ENCOUNTER — APPOINTMENT (OUTPATIENT)
Dept: GENERAL RADIOLOGY | Age: 73
End: 2018-01-03
Attending: EMERGENCY MEDICINE
Payer: MEDICARE

## 2018-01-03 ENCOUNTER — HOSPITAL ENCOUNTER (EMERGENCY)
Age: 73
Discharge: HOME | End: 2018-01-03
Attending: EMERGENCY MEDICINE
Payer: MEDICARE

## 2018-01-03 VITALS
HEART RATE: 79 BPM | TEMPERATURE: 98 F | WEIGHT: 155 LBS | OXYGEN SATURATION: 94 % | BODY MASS INDEX: 28.52 KG/M2 | SYSTOLIC BLOOD PRESSURE: 117 MMHG | DIASTOLIC BLOOD PRESSURE: 69 MMHG | HEIGHT: 62 IN | RESPIRATION RATE: 18 BRPM

## 2018-01-03 DIAGNOSIS — J10.1 INFLUENZA A: Primary | ICD-10-CM

## 2018-01-03 LAB
FLUAV AG NPH QL IA: POSITIVE
FLUBV AG NPH QL IA: NEGATIVE

## 2018-01-03 PROCEDURE — 71046 X-RAY EXAM CHEST 2 VIEWS: CPT

## 2018-01-03 PROCEDURE — 74011250637 HC RX REV CODE- 250/637: Performed by: EMERGENCY MEDICINE

## 2018-01-03 PROCEDURE — 94640 AIRWAY INHALATION TREATMENT: CPT

## 2018-01-03 PROCEDURE — 74011636637 HC RX REV CODE- 636/637: Performed by: EMERGENCY MEDICINE

## 2018-01-03 PROCEDURE — 87804 INFLUENZA ASSAY W/OPTIC: CPT | Performed by: EMERGENCY MEDICINE

## 2018-01-03 PROCEDURE — 74011000250 HC RX REV CODE- 250: Performed by: EMERGENCY MEDICINE

## 2018-01-03 PROCEDURE — A9270 NON-COVERED ITEM OR SERVICE: HCPCS | Performed by: EMERGENCY MEDICINE

## 2018-01-03 PROCEDURE — 99283 EMERGENCY DEPT VISIT LOW MDM: CPT | Performed by: EMERGENCY MEDICINE

## 2018-01-03 RX ORDER — PREDNISONE 20 MG/1
60 TABLET ORAL
Status: COMPLETED | OUTPATIENT
Start: 2018-01-03 | End: 2018-01-03

## 2018-01-03 RX ORDER — HYDROCODONE BITARTRATE AND HOMATROPINE METHYLBROMIDE 1.5; 5 MG/5ML; MG/5ML
5 SYRUP ORAL
Status: COMPLETED | OUTPATIENT
Start: 2018-01-03 | End: 2018-01-03

## 2018-01-03 RX ORDER — IPRATROPIUM BROMIDE AND ALBUTEROL SULFATE 2.5; .5 MG/3ML; MG/3ML
3 SOLUTION RESPIRATORY (INHALATION)
Status: COMPLETED | OUTPATIENT
Start: 2018-01-03 | End: 2018-01-03

## 2018-01-03 RX ADMIN — HYDROCODONE BITARTRATE AND HOMATROPINE METHYLBROMIDE 5 ML: 5; 1.5 SOLUTION ORAL at 17:34

## 2018-01-03 RX ADMIN — IPRATROPIUM BROMIDE AND ALBUTEROL SULFATE 3 ML: 2.5; .5 SOLUTION RESPIRATORY (INHALATION) at 17:45

## 2018-01-03 RX ADMIN — PREDNISONE 60 MG: 20 TABLET ORAL at 17:34

## 2018-01-03 NOTE — ED PROVIDER NOTES
HPI Comments: Patient is a 81-year-old female who is coming cough and generalized malaise since Saturday. She states she does like she's been wheezing a lot of pain without coughing. She also feels achy. She lives at [facility] in their assisted living facility where there is an active outbreak of influenza. Patient denies any vomiting or diarrhea she has felt nauseated though. Patient is a 71 y.o. female presenting with cough. The history is provided by the patient. Cough   Associated symptoms include myalgias and wheezing. Pertinent negatives include no chills and no shortness of breath.         Past Medical History:   Diagnosis Date    A-fib [facility])     Acquired hypothyroidism     Acute CVA (cerebrovascular accident) ([address] 6/21/2014    Acute lacunar infarction ([address]     Anxiety state, unspecified     Aortic valve stenosis, congenital 2/19/2016    CAD (coronary artery disease)     Candida Esophagitis 2/11/2010    Chronic kidney disease     Chronic pain     Depression     Dermatomyositis ([address]     on chronic steroids    Duodenal ulcer     Dysphagia 2/19/2016    Embolism and thrombosis of unspecified artery 9/12/2014    Fibromyalgia     History of anemia 03/12/2012    GI blood loss    History of drug overdose 09/2016    narcotics    History of encephalopathy 02/04/2010    History of fracture of hip 03/10/2012    femoral neck    History of mastectomy     bilat    Hypertension     controlled with medication    Immunosuppressed status ([address] 6/20/2014    Long term (current) use of anticoagulants     Malignant tumor of colon ([address]     1986-Surgery    Osteoarthritis     Other ill-defined cerebrovascular disease     Pain in joint, pelvic region and thigh     Personal history of fall     Postmenopausal atrophic vaginitis     Primary localized osteoarthrosis, pelvic region and thigh     PUD (peptic ulcer disease) 2009    PVD (peripheral vascular disease) ([address]     Reflux esophagitis     S/P AVR (aortic valve replacement)     [facility]    Seasonal allergic rhinitis due to pollen     Sleep disturbance     Symptomatic menopausal or female climacteric states     Thromboembolus [facility]) 2007    right arm , right leg    Unspecified disorder of liver     Urticaria     Mild on back       Past Surgical History:   Procedure Laterality Date    ABDOMEN SURGERY [address]    exploratory tubes & ovaries removed    HX ACL RECONSTRUCTION Left 1979    HX AORTIC VALVE REPLACEMENT  2004    [facility] Aortic valve Valve    HX AORTIC VALVE REPLACEMENT      HX CARPAL TUNNEL RELEASE Right 1995    HX CATARACT REMOVAL Right 3/20/2014    [facility]    HX CHOLECYSTECTOMY      HX COLONOSCOPY  9/25/13    Internal Hemorrhoids. Bx reveals Lymphocytic colitis. No further colonscopies will be scheduled due to concominant medical problems.  HX COLONOSCOPY  2010    Internal hemorrhoids. Repeated 2013, no further colonoscopies planned. [facility].  HX HYSTERECTOMY  1975    HX MASTECTOMY Bilateral 2000    Precancerous. [facility].  HX OTHER SURGICAL      right brachial  thrombosis    HX TONSIL AND ADENOIDECTOMY      HX TONSILLECTOMY      HX TUMOR REMOVAL  1986    Malignant tumor of colon.     REVISE TOTAL HIP REPLACEMENT Right 1/17/13    TOTAL HIP ARTHROPLASTY Right 12/2012, 3/11/12, 2/2013         Family History:   Problem Relation Age of Onset    Stroke Mother     Heart Disease Father     Hypertension Father     Heart Disease Sister     Seizures Brother        Social History     Social History    Marital status:      Spouse name: Norm    Number of children: N/A    Years of education: N/A     Occupational History    retired      Social History Main Topics    Smoking status: Former Smoker     Packs/day: 0.25     Years: 10.00     Quit date: 3/23/1976    Smokeless tobacco: Never Used      Comment: in 1970s    Alcohol use No    Drug use: Yes     Special: Prescription Comment: Norco    Sexual activity: No     Other Topics Concern    Not on file     Social History Narrative    Patient lives at [facility]. She denies any in-house stairs. 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 Activity reported as brain games, reading, and visiting friends. Exercise is reported as walking as tolerated. MARCELLUS QUILES 04/23/2014         ALLERGIES: Latex; Bee sting [sting, bee]; and Adhesive    Review of Systems   Constitutional: Negative for chills and fever. Respiratory: Positive for cough and wheezing. Negative for choking, chest tightness, shortness of breath and stridor. Musculoskeletal: Positive for arthralgias and myalgias. Skin: Negative. All other systems reviewed and are negative. Vitals:    01/03/18 1526   BP: 103/67   Pulse: 79   Resp: 18   Temp: 98 °F (36.7 °C)   SpO2: 92%   Weight: 70.3 kg (155 lb)   Height: 5' 2\" (1.575 m)            Physical Exam   Constitutional: She is oriented to person, place, and time. She appears well-developed and well-nourished. No distress. HENT:   Head: Normocephalic and atraumatic. Eyes: Conjunctivae are normal. No scleral icterus. Neck: Normal range of motion. Neck supple. Cardiovascular: Normal rate, regular rhythm and normal heart sounds. Pulmonary/Chest: Effort normal. No stridor. No respiratory distress. She has no rales. She exhibits no tenderness. Wheezes and rhonchi bilaterally present most clear with coughing   Abdominal: Soft. She exhibits no distension. There is no tenderness. There is no rebound and no guarding. Neurological: She is alert and oriented to person, place, and time. No focal weakness   Skin: Skin is warm and dry. No rash noted. She is not diaphoretic. No erythema. Psychiatric: She has a normal mood and affect. Her behavior is normal.   Nursing note and vitals reviewed.        MDM  Number of Diagnoses or Management Options  Influenza A:   Diagnosis management comments: Patient has flu she is 4 days out with normal vitals and negative chest xray. At this point I am just treating symptomatically. Floyd Guan MD; 1/3/2018 @7:08 PM Voice dictation software was used during the making of this note. This software is not perfect and grammatical and other typographical errors may be present. This note has not been proofread for errors.  ===================================================================        Amount and/or Complexity of Data Reviewed  Clinical lab tests: reviewed and ordered (Results for orders placed or performed during the hospital encounter of 01/03/18  -INFLUENZA A & B AG (RAPID TEST)       Result                                            Value                         Ref Range                       Influenza A Ag                                    POSITIVE (A)                  NEG                             Influenza B Ag                                    NEGATIVE                      NEG                       )  Tests in the radiology section of CPT®: reviewed and ordered (Xr Chest Pa Lat    Result Date: 1/3/2018  PA and lateral chest radiographs History: cough, 72 years Female COUGH, SORE THROAT, HEADACHE X5 DAYS LATERAL VIEW SEEMS TO HAVE AN ARTIFACT, I SEARCHED PATIENTS CLOTHES AND COULDN'T FIND ANYTHING, PT UNABLE TO GIVE MORE INFORMATION Comparison: Chest radiograph November 09, 2016 Findings:  Normal cardiomediastinal silhouette with evidence of valve replacement. Persistent hyperinflation suggestive of COPD. Minimal dependent subsegmental atelectasis bilateral lung bases. No evidence of pneumothorax, pleural effusion, or air space opacity. Visualized soft tissue and osseous structures otherwise unremarkable. Impression:    No significant interval change.    )      ED Course       Procedures

## 2018-01-03 NOTE — DISCHARGE INSTRUCTIONS

## 2018-01-03 NOTE — ED TRIAGE NOTES
[facility]

## 2018-02-26 ENCOUNTER — HOSPITAL ENCOUNTER (OUTPATIENT)
Dept: LAB | Age: 73
Discharge: HOME | End: 2018-02-26

## 2018-02-26 LAB
INR PPP: 6.3
PROTHROMBIN TIME: 55.7 SEC (ref 11.5–14.5)

## 2018-02-26 PROCEDURE — 85610 PROTHROMBIN TIME: CPT | Performed by: INTERNAL MEDICINE

## 2018-02-28 ENCOUNTER — HOSPITAL ENCOUNTER (OUTPATIENT)
Dept: LAB | Age: 73
Discharge: HOME | End: 2018-02-28

## 2018-02-28 LAB
INR PPP: 3.4
PROTHROMBIN TIME: 33.8 SEC (ref 11.5–14.5)

## 2018-02-28 PROCEDURE — 85610 PROTHROMBIN TIME: CPT | Performed by: INTERNAL MEDICINE

## 2018-03-30 ENCOUNTER — HOSPITAL ENCOUNTER (OUTPATIENT)
Dept: LAB | Age: 73
Discharge: HOME | End: 2018-03-30

## 2018-03-30 LAB
INR PPP: 2.2
PROTHROMBIN TIME: 24.3 SEC (ref 11.5–14.5)

## 2018-03-30 PROCEDURE — 85610 PROTHROMBIN TIME: CPT | Performed by: FAMILY MEDICINE

## 2018-04-06 ENCOUNTER — HOSPITAL ENCOUNTER (OUTPATIENT)
Dept: LAB | Age: 73
Discharge: HOME | End: 2018-04-06

## 2018-04-06 LAB
INR PPP: 2.5
PROTHROMBIN TIME: 26.5 SEC (ref 11.5–14.5)

## 2018-04-06 PROCEDURE — 85610 PROTHROMBIN TIME: CPT | Performed by: FAMILY MEDICINE

## 2018-04-23 PROBLEM — G93.41 ACUTE METABOLIC ENCEPHALOPATHY: Status: RESOLVED | Noted: 2017-06-28 | Resolved: 2018-04-23

## 2018-04-23 PROBLEM — M79.2 NEUROPATHIC PAIN: Status: ACTIVE | Noted: 2018-04-23

## 2018-04-27 ENCOUNTER — HOSPITAL ENCOUNTER (OUTPATIENT)
Dept: LAB | Age: 73
Discharge: HOME | End: 2018-04-27

## 2018-04-27 PROBLEM — Z79.01 LONG TERM (CURRENT) USE OF ANTICOAGULANTS: Status: ACTIVE | Noted: 2018-04-27

## 2018-04-27 LAB
INR PPP: 4.6
PROTHROMBIN TIME: 43.7 SEC (ref 11.5–14.5)

## 2018-04-27 PROCEDURE — 85610 PROTHROMBIN TIME: CPT | Performed by: INTERNAL MEDICINE

## 2018-05-09 PROBLEM — I50.32 DIASTOLIC CHF, CHRONIC (HCC): Status: ACTIVE | Noted: 2018-05-09

## 2018-07-20 ENCOUNTER — HOSPITAL ENCOUNTER (EMERGENCY)
Age: 73
Discharge: HOME | End: 2018-07-20
Attending: EMERGENCY MEDICINE
Payer: MEDICARE

## 2018-07-20 ENCOUNTER — APPOINTMENT (OUTPATIENT)
Dept: GENERAL RADIOLOGY | Age: 73
End: 2018-07-20
Attending: EMERGENCY MEDICINE
Payer: MEDICARE

## 2018-07-20 VITALS
HEART RATE: 75 BPM | TEMPERATURE: 97.8 F | BODY MASS INDEX: 26.93 KG/M2 | RESPIRATION RATE: 16 BRPM | DIASTOLIC BLOOD PRESSURE: 73 MMHG | SYSTOLIC BLOOD PRESSURE: 169 MMHG | HEIGHT: 63 IN | WEIGHT: 152 LBS | OXYGEN SATURATION: 95 %

## 2018-07-20 DIAGNOSIS — M25.552 LEFT HIP PAIN: Primary | ICD-10-CM

## 2018-07-20 LAB
INR PPP: 3.7
PROTHROMBIN TIME: 38.4 SEC (ref 11.5–14.5)

## 2018-07-20 PROCEDURE — 96374 THER/PROPH/DIAG INJ IV PUSH: CPT | Performed by: EMERGENCY MEDICINE

## 2018-07-20 PROCEDURE — 74011250636 HC RX REV CODE- 250/636: Performed by: EMERGENCY MEDICINE

## 2018-07-20 PROCEDURE — 85610 PROTHROMBIN TIME: CPT | Performed by: EMERGENCY MEDICINE

## 2018-07-20 PROCEDURE — 73502 X-RAY EXAM HIP UNI 2-3 VIEWS: CPT

## 2018-07-20 PROCEDURE — 99282 EMERGENCY DEPT VISIT SF MDM: CPT | Performed by: EMERGENCY MEDICINE

## 2018-07-20 RX ORDER — MORPHINE SULFATE 8 MG/ML
4 INJECTION, SOLUTION INTRAMUSCULAR; INTRAVENOUS
Status: COMPLETED | OUTPATIENT
Start: 2018-07-20 | End: 2018-07-20

## 2018-07-20 RX ORDER — HYDROCODONE BITARTRATE AND ACETAMINOPHEN 5; 325 MG/1; MG/1
1 TABLET ORAL
Qty: 15 TAB | Refills: 0 | Status: SHIPPED | OUTPATIENT
Start: 2018-07-20 | End: 2018-07-25

## 2018-07-20 RX ADMIN — Medication 4 MG: at 10:18

## 2018-07-20 NOTE — DISCHARGE INSTRUCTIONS
Hip Pain: Care Instructions  Your Care Instructions    Hip pain may be caused by many things, including overuse, a fall, or a twisting movement. Another cause of hip pain is arthritis. Your pain may increase when you stand up, walk, or squat. The pain may come and go or may be constant. Home treatment can help relieve hip pain, swelling, and stiffness. If your pain is ongoing, you may need more tests and treatment. Follow-up care is a key part of your treatment and safety. Be sure to make and go to all appointments, and call your doctor if you are having problems. It's also a good idea to know your test results and keep a list of the medicines you take. How can you care for yourself at home? · Take pain medicines exactly as directed. ¨ If the doctor gave you a prescription medicine for pain, take it as prescribed. ¨ If you are not taking a prescription pain medicine, ask your doctor if you can take an over-the-counter medicine. · Rest and protect your hip. Take a break from any activity, including standing or walking, that may cause pain. · Put ice or a cold pack against your hip for 10 to 20 minutes at a time. Try to do this every 1 to 2 hours for the next 3 days (when you are awake) or until the swelling goes down. Put a thin cloth between the ice and your skin. · Sleep on your healthy side with a pillow between your knees, or sleep on your back with pillows under your knees. · If there is no swelling, you can put moist heat, a heating pad, or a warm cloth on your hip. Do gentle stretching exercises to help keep your hip flexible. · Learn how to prevent falls. Have your vision and hearing checked regularly. Wear slippers or shoes with a nonskid sole. · Stay at a healthy weight. · Wear comfortable shoes. When should you call for help? Call 911 anytime you think you may need emergency care.  For example, call if:    · You have sudden chest pain and shortness of breath, or you cough up blood.     · You are not able to stand or walk or bear weight.     · Your buttocks, legs, or feet feel numb or tingly.     · Your leg or foot is cool or pale or changes color.     · You have severe pain.    Call your doctor now or seek immediate medical care if:    · You have signs of infection, such as:  ¨ Increased pain, swelling, warmth, or redness in the hip area. ¨ Red streaks leading from the hip area. ¨ Pus draining from the hip area. ¨ A fever.     · You have signs of a blood clot, such as:  ¨ Pain in your calf, back of the knee, thigh, or groin. ¨ Redness and swelling in your leg or groin.     · You are not able to bend, straighten, or move your leg normally.     · You have trouble urinating or having bowel movements.    Watch closely for changes in your health, and be sure to contact your doctor if:    · You do not get better as expected. Where can you learn more? Go to http://bety-mason.info/. Enter L869 in the search box to learn more about \"Hip Pain: Care Instructions. \"  Current as of: November 20, 2017  Content Version: 11.7  © 1797-3809 [employer]. Care instructions adapted under license by [employer] (which disclaims liability or warranty for this information). If you have questions about a medical condition or this instruction, always ask your healthcare professional. [place] [address] any warranty or liability for your use of this information.

## 2018-07-20 NOTE — ED TRIAGE NOTES
Pt came in from [facility] via EMS. She fell yesterday and Wednesday. She uses a walker at home, and states that she tripped on the walker. History of frequent falls. History of multiple right hip surgeries. Patient is on blood thinners, but denies hitting head. Pt complaining of left hip pain and left-sided generalized pain. Absent of shortening or rotation.

## 2018-07-20 NOTE — ED NOTES
I have reviewed discharge instructions with the patient. The patient verbalized understanding. Patient left ED via Discharge Method: stretcher to Nursing Home with Rhea Colon. Opportunity for questions and clarification provided. Patient given 1 scripts. To continue your aftercare when you leave the hospital, you may receive an automated call from our care team to check in on how you are doing. This is a free service and part of our promise to provide the best care and service to meet your aftercare needs.  If you have questions, or wish to unsubscribe from this service please call [phone]. Thank you for Choosing our [facility] Emergency Department.

## 2018-07-20 NOTE — ED PROVIDER NOTES
HPI:  80-year-old female history of prior CVA on Coumadin, prior chronic pain as year with left hip pain status post fall yesterday. Stated she tripped over her walker. Landed on the right side of her arm. Did not hit head. No loss of consciousness. However since then has had left hip pain. She's had prior total right hip surgery. Denies any fever nausea vomiting, headache, injury to the scalp. ROS  Constitutional: No fever, no chills  Skin: no rash  Eye: No vision changes  ENMT: No sore throat  Respiratory: No shortness of breath, no cough  Cardiovascular: No chest pain, no palpitations  Gastrointestinal: No vomiting, no nausea, no diarrhea, no abdominal pain  : No dysuria  MSK: No back pain, no muscle pain,  Neuro: No headache, no change in mental status, no numbness, no tingling, no weakness  Psych:   Endocrine:   All other review of systems positive per history of present illness and the above otherwise negative or noncontributory.     Visit Vitals    /73 (BP 1 Location: Right arm, BP Patient Position: At rest)    Pulse 75    Temp 97.8 °F (36.6 °C)    Resp 16    Ht 5' 3\" (1.6 m)    Wt 68.9 kg (152 lb)    SpO2 95%    BMI 26.93 kg/m2     Past Medical History:   Diagnosis Date    A-fib ([address]     Acquired hypothyroidism     Acute CVA (cerebrovascular accident) ([address] 6/21/2014    Acute lacunar infarction ([address]     Anxiety state, unspecified     Aortic valve stenosis, congenital 2/19/2016    CAD (coronary artery disease)     Candida Esophagitis 2/11/2010    Chronic kidney disease     Chronic pain     Depression     Dermatomyositis ([address]     on chronic steroids    Duodenal ulcer     Dysphagia 2/19/2016    Embolism and thrombosis of unspecified artery ([address] 9/12/2014    Fibromyalgia     History of anemia 03/12/2012    GI blood loss    History of drug overdose 09/2016    narcotics    History of encephalopathy 02/04/2010    History of fracture of hip 03/10/2012    femoral neck  History of mastectomy     bilat    Hypertension     controlled with medication    Immunosuppressed status [facility] 6/20/2014    Long term (current) use of anticoagulants     Malignant tumor of colon ([facility]     1986-Surgery    Osteoarthritis     Other ill-defined cerebrovascular disease     Pain in joint, pelvic region and thigh     Personal history of fall     Postmenopausal atrophic vaginitis     Primary localized osteoarthrosis, pelvic region and thigh     PUD (peptic ulcer disease) 2009    PVD (peripheral vascular disease) ([facility]     Reflux esophagitis     S/P AVR (aortic valve replacement)     [facility]    Seasonal allergic rhinitis due to pollen     Sleep disturbance     Symptomatic menopausal or female climacteric states     Thromboembolus [facility]) 2007    right arm , right leg    Unspecified disorder of liver     Urticaria     Mild on back     Past Surgical History:   Procedure Laterality Date    ABDOMEN SURGERY [address]    exploratory tubes & ovaries removed    HX ACL RECONSTRUCTION Left 1979    HX AORTIC VALVE REPLACEMENT  2004    [facility] Aortic valve Valve    HX AORTIC VALVE REPLACEMENT      HX CARPAL TUNNEL RELEASE Right 1995    HX CATARACT REMOVAL Right 3/20/2014    [facility]    HX CHOLECYSTECTOMY      HX COLONOSCOPY  9/25/13    Internal Hemorrhoids. Bx reveals Lymphocytic colitis. No further colonscopies will be scheduled due to concominant medical problems.  HX COLONOSCOPY  2010    Internal hemorrhoids. Repeated 2013, no further colonoscopies planned. [facility].  HX HYSTERECTOMY  1975    HX MASTECTOMY Bilateral 2000    Precancerous. [facility].  HX OTHER SURGICAL      right brachial  thrombosis    HX TONSIL AND ADENOIDECTOMY      HX TONSILLECTOMY      HX TUMOR REMOVAL  1986    Malignant tumor of colon.     REVISE TOTAL HIP REPLACEMENT Right 1/17/13    TOTAL HIP ARTHROPLASTY Right 12/2012, 3/11/12, 2/2013     Prior to Admission Medications Prescriptions Last Dose Informant Patient Reported? Taking? CALCIUM CARBONATE (CALCIUM 500 PO)   Yes No   Sig: Take 1 Tab by mouth two (2) times a day. baclofen (LIORESAL) 10 mg tablet   No No   Sig: Take 1 Tab by mouth three (3) times daily. Patient taking differently: Take 10 mg by mouth three (3) times daily. 2  Tabs TID   benzonatate (TESSALON) 200 mg capsule   Yes No   Sig: Take 200 mg by mouth three (3) times daily as needed for Cough. carvedilol (COREG) 6.25 mg tablet   No No   Sig: Take 1 Tab by mouth two (2) times daily (with meals). cholecalciferol, vitamin D3, (VITAMIN D3) 2,000 unit tab   Yes No   Sig: Take  by mouth daily. colestipol (COLESTID) 1 gram tablet   Yes No   Sig: Take 1 g by mouth two (2) times a day. cyanocobalamin 1,000 mcg tablet   Yes No   Sig: Take 1,000 mcg by mouth daily. dicyclomine (BENTYL) 10 mg capsule   No No   Sig: Take 1 Cap by mouth daily as needed. diphenoxylate-atropine (LOMOTIL) 2.5-0.025 mg per tablet   Yes No   Sig: Take  by mouth four (4) times daily as needed for Diarrhea.   hydroCHLOROthiazide (HYDRODIURIL) 25 mg tablet   No No   Sig: Take 1 Tab by mouth daily. isosorbide mononitrate ER (IMDUR) 30 mg tablet   No No   Sig: Take 1 Tab by mouth daily. levETIRAcetam (KEPPRA) 500 mg tablet   Yes No   Sig: Take 500 mg by mouth two (2) times a day. levothyroxine (SYNTHROID) 125 mcg tablet   Yes No   Sig: Take  by mouth Daily (before breakfast). nitroglycerin (NITROSTAT) 0.4 mg SL tablet   No No   Si Tab by SubLINGual route every five (5) minutes as needed for Chest Pain. ondansetron hcl (ZOFRAN, AS HYDROCHLORIDE,) 4 mg tablet   No No   Sig: Take 1 Tab by mouth every eight (8) hours as needed for Nausea. pantoprazole (PROTONIX) 40 mg tablet   No No   Sig: Take 1 Tab by mouth daily. predniSONE (DELTASONE) 5 mg tablet   Yes No   Sig: Take 5 mg by mouth daily.  Taking as of 14   spironolactone (ALDACTONE) 25 mg tablet   No No   Sig: Take 1 Tab by mouth two (2) times a day. sulfaSALAzine (AZULFIDINE) 500 mg tablet   Yes No   Sig: Take 1,000 mg by mouth three (3) times daily as needed. traZODone (DESYREL) 100 mg tablet   No No   Sig: Take 2 hs prn sleep   Patient taking differently: 200 mg nightly. venlafaxine-SR (EFFEXOR-XR) 150 mg capsule   Yes No   Sig: Take  by mouth daily. warfarin (COUMADIN) 2 mg tablet   No No   Si mg Tues, Thurs, Sat and 4 mg all other days. Starting       Facility-Administered Medications: None         Adult Exam   General: alert, no acute distress  Head: normocephalic, atraumatic  ENT: moist mucous membranes  Neck: supple, non-tender; full range of motion  Cardiovascular: regular rate and rhythm, normal peripheral perfusion, no edema  Respiratory:  normal respirations; no wheezing, rales or rhonchi  Gastrointestinal: soft, non-tender; no rebound or guarding, no peritoneal signs, no distension  Back: non-tender, full range of motion  Musculoskeletal: pelvic is stable. Complaining of pain on palpation in the lateral aspect of the left hip. I am able to range her left hip internally, externally and flex. There is some pain. Her left leg appears slightly shortened but not rotated. She is able to put both knee medially touching each other. She has had right hip surgery before. Distal pulses intact. Neurological: alert and oriented x 4, no gross focal deficits; normal speech  Psychiatric: cooperative; appropriate mood and affect    MDM: x-ray negative for any acute fracture. On repeat evaluation I am again able to range her left hip. Based on normal x-ray able to fully range and the fact that she fell onto the right side of her body I feel less likely that she has an acute fracture. I do not suspect she has a pelvic fracture at this time. I suspect her left leg is slightly shortened compared to the right is because she has had prior right total hip replacement.   I do not feel an emergent MRI or CT is necessary at this time. We'll discharge home. ED Course       Xr Hip Lt W Or Wo Pelv 2-3 Vws    Result Date: 7/20/2018  EXAMINATION: HIP AND PELVIS RADIOGRAPHS 7/20/2018 9:48 AM ACCESSION NUMBER: [id number] INDICATION: prior RT hip total. Talib Lentz yesterday. Now pain in LT hip. Unable to bear wt. COMPARISON: Right hip x-rays 3/10/2012 TECHNIQUE: A single view of the pelvis and AP and crosstable lateral views of the left hip were obtained. FINDINGS: Surgical changes status post right hip total arthroplasty the repair previously seen right hip fracture. The hardware is not significantly different on the AP view in comparison to the examination of 4/13/2015. There is heterotopic ossification about the greater trochanter. There is slight protrusion of the acetabular component of the arthroplasty medially into the pelvis, unchanged from prior exams. The sacroiliac joints and pubic symphysis are normally aligned. There is no evidence of an acute fracture of the pelvis the single view of the pelvis. There is no evidence of an acute fracture of the proximal left femur on the AP view. The crosstable lateral views are suboptimal due to overlying soft tissues. IMPRESSION: No evidence of acute fracture of the proximal left femur or left acetabulum on the AP view. The crosstable lateral views are suboptimal due to overlying soft tissues. If there is continued inability to bear weight, cross-sectional imaging may be of benefit. A right hip arthroplasty is not significantly changed in comparison to prior exams on the view of the pelvis. No evidence of acute pelvic fracture. VOICE DICTATED BY: Dr. Varner Payment    No results found for this or any previous visit (from the past 24 hour(s)). Dragon voice recognition software was used to create this note. Although the note has been reviewed and corrected where necessary, additional errors may have been overlooked and remain in the text.

## 2018-08-26 ENCOUNTER — APPOINTMENT (OUTPATIENT)
Dept: GENERAL RADIOLOGY | Age: 73
End: 2018-08-26
Attending: EMERGENCY MEDICINE
Payer: MEDICARE

## 2018-08-26 ENCOUNTER — HOSPITAL ENCOUNTER (EMERGENCY)
Age: 73
Discharge: HOME | End: 2018-08-26
Attending: EMERGENCY MEDICINE
Payer: MEDICARE

## 2018-08-26 VITALS
SYSTOLIC BLOOD PRESSURE: 126 MMHG | HEIGHT: 63 IN | TEMPERATURE: 98.2 F | OXYGEN SATURATION: 97 % | DIASTOLIC BLOOD PRESSURE: 66 MMHG | WEIGHT: 152 LBS | RESPIRATION RATE: 16 BRPM | HEART RATE: 70 BPM | BODY MASS INDEX: 26.93 KG/M2

## 2018-08-26 DIAGNOSIS — S40.022A CONTUSION OF LEFT UPPER ARM, INITIAL ENCOUNTER: Primary | ICD-10-CM

## 2018-08-26 LAB
INR BLD: 2.6 (ref 0.9–1.2)
PT BLD: 29.9 SECS (ref 9.6–11.6)

## 2018-08-26 PROCEDURE — 85610 PROTHROMBIN TIME: CPT

## 2018-08-26 PROCEDURE — A4565 SLINGS: HCPCS

## 2018-08-26 PROCEDURE — 73060 X-RAY EXAM OF HUMERUS: CPT

## 2018-08-26 PROCEDURE — 73030 X-RAY EXAM OF SHOULDER: CPT

## 2018-08-26 PROCEDURE — 99284 EMERGENCY DEPT VISIT MOD MDM: CPT | Performed by: EMERGENCY MEDICINE

## 2018-08-26 PROCEDURE — 74011250637 HC RX REV CODE- 250/637: Performed by: EMERGENCY MEDICINE

## 2018-08-26 RX ORDER — HYDROCODONE BITARTRATE AND ACETAMINOPHEN 5; 325 MG/1; MG/1
1 TABLET ORAL ONCE
Status: COMPLETED | OUTPATIENT
Start: 2018-08-26 | End: 2018-08-26

## 2018-08-26 RX ADMIN — HYDROCODONE BITARTRATE AND ACETAMINOPHEN 1 TABLET: 5; 325 TABLET ORAL at 08:10

## 2018-08-26 NOTE — ED TRIAGE NOTES
Pt arrives from [facility], states she fell out of her shower chair this morning and landed on left shoulder. No obvious deformity, bruising present at deltoid. Pt states no LOC. Denies hitting head. Takes coumadin.

## 2018-08-26 NOTE — ED NOTES
Sling applied as order pt refused ice to shoulder.  At present pt is alert, has good left rad pulse cap refill less than 2 sec

## 2018-08-26 NOTE — ED NOTES
Pt sts she fell in the shower this am and now left shoulder hurts no other complaints, has burse left upper arm, good rad pulse at present

## 2018-08-26 NOTE — ED PROVIDER NOTES
HPI Comments: Sravan Perking to the left side while on her shower stool. Injury to left upper arm and left shoulder. Denies headache, head injury or neck pain or back pain. Denies hip or lower extremity pain or injury. No loss of consciousness. Patient is on Coumadin. Patient is a 71 y.o. female presenting with shoulder pain. The history is provided by the patient. Shoulder Pain    The incident occurred less than 1 hour ago. The incident occurred at home. The injury mechanism was a fall. The left shoulder is affected. The pain is at a severity of 7/10. The pain has been constant since onset. The pain does not radiate. Pertinent negatives include no numbness.         Past Medical History:   Diagnosis Date    A-fib [facility])     Acquired hypothyroidism     Acute CVA (cerebrovascular accident) ([address] 6/21/2014    Acute lacunar infarction ([address]     Anxiety state, unspecified     Aortic valve stenosis, congenital 2/19/2016    CAD (coronary artery disease)     Candida Esophagitis 2/11/2010    Chronic kidney disease     Chronic pain     Depression     Dermatomyositis ([address]     on chronic steroids    Duodenal ulcer     Dysphagia 2/19/2016    Embolism and thrombosis of unspecified artery ([address] 9/12/2014    Fibromyalgia     History of anemia 03/12/2012    GI blood loss    History of drug overdose 09/2016    narcotics    History of encephalopathy 02/04/2010    History of fracture of hip 03/10/2012    femoral neck    History of mastectomy     bilat    Hypertension     controlled with medication    Immunosuppressed status ([address] 6/20/2014    Long term (current) use of anticoagulants     Malignant tumor of colon ([address]     1986-Surgery    Osteoarthritis     Other ill-defined cerebrovascular disease     Pain in joint, pelvic region and thigh     Personal history of fall     Postmenopausal atrophic vaginitis     Primary localized osteoarthrosis, pelvic region and thigh     PUD (peptic ulcer disease) 2009    PVD (peripheral vascular disease) [facility]     Reflux esophagitis     S/P AVR (aortic valve replacement)     [facility]    Seasonal allergic rhinitis due to pollen     Sleep disturbance     Symptomatic menopausal or female climacteric states     Thromboembolus [facility]) 2007    right arm , right leg    Unspecified disorder of liver     Urticaria     Mild on back       Past Surgical History:   Procedure Laterality Date    ABDOMEN SURGERY [address]    exploratory tubes & ovaries removed    HX ACL RECONSTRUCTION Left 1979    HX AORTIC VALVE REPLACEMENT  2004    [place] Aortic valve Valve    HX AORTIC VALVE REPLACEMENT      HX CARPAL TUNNEL RELEASE Right 1995    HX CATARACT REMOVAL Right 3/20/2014    [facility]    HX CHOLECYSTECTOMY      HX COLONOSCOPY  9/25/13    Internal Hemorrhoids. Bx reveals Lymphocytic colitis. No further colonscopies will be scheduled due to concominant medical problems.  HX COLONOSCOPY  2010    Internal hemorrhoids. Repeated 2013, no further colonoscopies planned. N[place].  HX HYSTERECTOMY  1975    HX MASTECTOMY Bilateral 2000    Precancerous. Ferna Barbosa.  HX OTHER SURGICAL      right brachial  thrombosis    HX TONSIL AND ADENOIDECTOMY      HX TONSILLECTOMY      HX TUMOR REMOVAL  1986    Malignant tumor of colon.     REVISE TOTAL HIP REPLACEMENT Right 1/17/13    TOTAL HIP ARTHROPLASTY Right 12/2012, 3/11/12, 2/2013         Family History:   Problem Relation Age of Onset    Stroke Mother     Heart Disease Father     Hypertension Father     Heart Disease Sister     Seizures Brother        Social History     Social History    Marital status:      Spouse name: Norm    Number of children: N/A    Years of education: N/A     Occupational History    retired      Social History Main Topics    Smoking status: Former Smoker     Packs/day: 0.25     Years: 10.00     Quit date: 3/23/1976    Smokeless tobacco: Never Used      Comment: in 1970s    Alcohol use No    Drug use: Yes     Special: Prescription      Comment: Norco    Sexual activity: No     Other Topics Concern    Not on file     Social History Narrative    Patient lives at [facility]. She denies any in-house stairs. 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 Activity reported as brain games, reading, and visiting friends. Exercise is reported as walking as tolerated. ETTA,RMA 04/23/2014         ALLERGIES: Latex; Bee sting [sting, bee]; and Adhesive    Review of Systems   HENT: Negative for facial swelling. Respiratory: Negative for shortness of breath. Cardiovascular: Negative for chest pain. Gastrointestinal: Negative for nausea and vomiting. Musculoskeletal: Negative for back pain and neck pain. Neurological: Negative for weakness, numbness and headaches (no loss of consciousness). Vitals:    08/26/18 0732   BP: 126/59   Pulse: 72   Resp: 14   Temp: 98.4 °F (36.9 °C)   SpO2: 95%   Weight: 68.9 kg (152 lb)   Height: 5' 3\" (1.6 m)            Physical Exam   Constitutional: She is oriented to person, place, and time. She appears well-developed and well-nourished. HENT:   Head: Normocephalic and atraumatic. Eyes: Conjunctivae and EOM are normal. Pupils are equal, round, and reactive to light. Neck: Trachea normal and full passive range of motion without pain. No spinous process tenderness and no muscular tenderness present. Cardiovascular: Normal rate, regular rhythm, normal heart sounds and intact distal pulses. Pulmonary/Chest: Effort normal and breath sounds normal. She exhibits no tenderness. Abdominal: Soft. Bowel sounds are normal. She exhibits no distension. There is no tenderness. There is no rebound and no guarding.    Musculoskeletal: She exhibits tenderness (tender left mid upper arm and left shoulder area but primarily mid upper arm. motor and sensation intact. 2+ radial pulses bilaterally. ). Left shoulder: She exhibits decreased range of motion (mild decreased range of motion but patient is able to touch the opposite shoulder so doubt dislocation), tenderness, bony tenderness, swelling and pain. She exhibits normal pulse. Cervical back: She exhibits tenderness. Left upper arm: She exhibits tenderness, bony tenderness, swelling and edema. She exhibits no deformity and no laceration. Arms:  Pelvis stable and nontender. Lower extremities moved without pain. Lower extremities nontender   Neurological: She is alert and oriented to person, place, and time. She has normal strength. No sensory deficit. GCS eye subscore is 4. GCS verbal subscore is 5. GCS motor subscore is 6. Skin: Skin is warm and dry. Nursing note and vitals reviewed. MDM  Number of Diagnoses or Management Options  Contusion of left upper arm, initial encounter:   Diagnosis management comments: Isolated left upper arm and left shoulder injury. X-ray to exclude fracture. No headache or head injury. No loss of consciousness. 8:31 AM  X-rays are negative for fracture or dislocation. Left upper extremity contusion. Amount and/or Complexity of Data Reviewed  Clinical lab tests: ordered and reviewed (Results for orders placed or performed during the hospital encounter of 08/26/18  -POC PT/INR       Result                                            Value                         Ref Range                       Prothrombin time (POC)                            29.9 (H)                      9.6 - 11.6 SECS                 INR (POC)                                         2.6 (H)                       0.9 - 1.2                  )  Tests in the radiology section of CPT®: ordered and reviewed (Xr Shoulder Lt Ap/lat Min 2 V    Result Date: 8/26/2018  History: Left shoulder pain. Injury. 3 views left shoulder FINDINGS: There is osteopenia.  No definite acute fracture or dislocation. The included left lung is clear. IMPRESSION: No acute fracture or dislocation. Xr Humerus Lt    Result Date: 8/26/2018  History: Left arm injury, pain after fall today. 2 views left arm FINDINGS: 2 views of the left arm demonstrate no acute fracture, dislocation, or additional bony abnormality. IMPRESSION: No acute findings.     )          ED Course       Procedures

## 2018-08-26 NOTE — ED NOTES
Dr Srini Villarreal reviewed discharge instructions with the patient. The patient verbalized understanding. She requested a prescription for Norco but she also told Dr Srini Villarreal that she was not out form the previous prescription Dr Laina Villalobos had given her. Patient left ED via Discharge Method: stretcher to Rm 105 at [facility] per EMS    Opportunity for questions and clarification provided. Patient given 0 scripts. To continue your aftercare when you leave the hospital, you may receive an automated call from our care team to check in on how you are doing. This is a free service and part of our promise to provide the best care and service to meet your aftercare needs.  If you have questions, or wish to unsubscribe from this service please call [phone]. Thank you for Choosing our [facility].
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
## 2018-12-11 ENCOUNTER — APPOINTMENT (OUTPATIENT)
Dept: GENERAL RADIOLOGY | Age: 73
End: 2018-12-11
Attending: FAMILY MEDICINE
Payer: MEDICARE

## 2018-12-11 ENCOUNTER — HOSPITAL ENCOUNTER (OUTPATIENT)
Dept: GENERAL RADIOLOGY | Age: 73
Discharge: HOME | End: 2018-12-11
Attending: FAMILY MEDICINE
Payer: MEDICARE

## 2018-12-11 DIAGNOSIS — R13.10 DYSPHAGIA, UNSPECIFIED TYPE: ICD-10-CM

## 2018-12-11 DIAGNOSIS — R13.19 ESOPHAGEAL DYSPHAGIA: ICD-10-CM

## 2018-12-11 PROCEDURE — 74011000255 HC RX REV CODE- 255: Performed by: FAMILY MEDICINE

## 2018-12-11 PROCEDURE — 74247 XR UPPER GI W KUB AIR CONT: CPT

## 2018-12-11 RX ADMIN — BARIUM SULFATE 700 MG: 700 TABLET ORAL at 10:06

## 2018-12-11 RX ADMIN — BARIUM SULFATE 355 ML: 0.6 SUSPENSION ORAL at 09:56

## 2019-02-07 ENCOUNTER — HOSPITAL ENCOUNTER (OUTPATIENT)
Dept: MRI IMAGING | Age: 74
Discharge: HOME | End: 2019-02-07
Attending: PAIN MEDICINE
Payer: MEDICARE

## 2019-02-07 DIAGNOSIS — M54.16 LUMBAR RADICULOPATHY: ICD-10-CM

## 2019-02-07 PROCEDURE — 72148 MRI LUMBAR SPINE W/O DYE: CPT

## 2019-03-14 ENCOUNTER — HOSPITAL ENCOUNTER (OUTPATIENT)
Dept: CT IMAGING | Age: 74
Discharge: HOME | End: 2019-03-14
Attending: INTERNAL MEDICINE
Payer: MEDICARE

## 2019-03-14 DIAGNOSIS — R10.32 ABDOMINAL PAIN, LEFT LOWER QUADRANT: ICD-10-CM

## 2019-03-14 DIAGNOSIS — K52.832 LYMPHOCYTIC COLITIS: ICD-10-CM

## 2019-03-14 LAB — CREAT BLD-MCNC: 0.8 MG/DL (ref 0.8–1.5)

## 2019-03-14 PROCEDURE — 74177 CT ABD & PELVIS W/CONTRAST: CPT

## 2019-03-14 PROCEDURE — 74011000258 HC RX REV CODE- 258: Performed by: INTERNAL MEDICINE

## 2019-03-14 PROCEDURE — 74011636320 HC RX REV CODE- 636/320: Performed by: INTERNAL MEDICINE

## 2019-03-14 PROCEDURE — 82565 ASSAY OF CREATININE: CPT

## 2019-03-14 RX ORDER — SODIUM CHLORIDE 0.9 % (FLUSH) 0.9 %
10 SYRINGE (ML) INJECTION
Status: COMPLETED | OUTPATIENT
Start: 2019-03-14 | End: 2019-03-14

## 2019-03-14 RX ADMIN — Medication 10 ML: at 14:12

## 2019-03-14 RX ADMIN — SODIUM CHLORIDE 100 ML: 900 INJECTION, SOLUTION INTRAVENOUS at 14:12

## 2019-03-14 RX ADMIN — IOPAMIDOL 100 ML: 755 INJECTION, SOLUTION INTRAVENOUS at 14:12

## 2019-03-14 RX ADMIN — DIATRIZOATE MEGLUMINE AND DIATRIZOATE SODIUM 15 ML: 660; 100 LIQUID ORAL; RECTAL at 14:12

## 2019-03-27 NOTE — H&P
> 
 
 
Patient:   Brandon Tineo YOB: 1945 Date:                        3/29/19 This 68year old female presents for lymphocytic colitis, Dysphagia and nausea. History of Present Illness: 1.  lymphocytic colitis 2. Dysphagia 3.  nausea Ms. Jolie Rosales is a 68year old female with lymphocytic colitis presents for follow up of dysphagia and nausea. She has increased coughing when she is eating or drinking. She had a swallowing study 2-3 months ago which did not show stricture or aspiration, per patient. She has nausea. She continues to have chronic LLQ pain, which has been ongoing for at least 5 years. Bentyl does not help this pain. Her colitis is managed on Azulfidine 1000mg TID and BMs are regular. She takes Bentyl every few weeks as needed for generalized abdominal pain. She has occasional diarrhea for which she takes Lomotil prn. Weight gain of 20lbs since 2017. She is on Coumadin for [facility] [place]'s valve. PROBLEM LIST:    
Problem Description Onset Date Chronic Clinical Status Notes Abnormal liver enzyme 10/21/2014 N  first noted in 2014-AST 48, ALT 78-methotrexate discontinued PAST MEDICAL/SURGICAL HISTORY   (Detailed) Disease/disorder Onset Date Management Date Comments  
biliary sludge 2015 ercp- papillotomy Gastric ulcer 2010 EGD 2011 Peptic ulcer disease 2010 DVT 2007 ACL 1979     
lymphocytic colitis  colonoscopy with biopsy 2013   
  colonoscopy 2010 Back surgery 1999 Systemic lupus erythematosus Thyroid disease      
endometriosis    TKR 02/19/2019 -  
Gastroesophageal reflux disease Irritable bowel disease      
  ovaries removed at age 44  TKR 02/19/2019 - Cholecystectomy Hysterectomy AVR - [facility] [place]'s valve      
cervical torticollis Dermatomyositis Additional Medical History Fibromyalgia Arthritis Thoracic kyphosis Torticollis Scoliosis Muscle wasting due to dermatomytosis endometriosis Additional Surgical History Right hemiarthroplasty 3/11/12 Right total hip replacement 13 Right hip revision 13 Right hip dislocation 3/15/13 AVR 12/15/04 Right brachial thrombosis 10/2007 Bilateral mastectomy 2000 
ovaries removed at age 44 Procedure History Test Date Results Interp EGD 2013 Gastritis, w/o bleeding, Hiatal Hernia COLONOSCOPY 2013 NORMAL COLO EXAM, Internal hemorrhoids EGD 3/22/11 ([facility]) for anemia. gastric ulcer and hiatal hernia. 
 
colonoscopy 2/8/10 ([facility]) for anemia with internal hemorrhoids.  egd- Joel Fernandes Family History  (Detailed) Relationship Family Member Name  Age at Death Condition Onset Age Cause of Death No family history of Colon polyps  N No family history of Cancer, colon  N Additional Family History 
denies family history of colorectal cancer or polyps. Family history positive for heart disease, aneurysm and seizure disorder. Social History:  (Detailed) Preferred language is [place]. MARITAL STATUS/FAMILY/SOCIAL SUPPORT Currently . Previously  CHILDREN Has children: 
Smoking status: Former smoker. ALCOHOL There is no history of alcohol use. CAFFEINE The patient does not use caffeine. CURRENT MEDICATIONS Medication Name Sig Desc  
folic acid 1 mg tablet take 1 tablet by oral route  every day  
prednisone 5 mg tablet take 1 tablet by oral route  every day  
lorazepam 0.5 mg tablet take 1 Tablet by oral route 2 times every day as needed  
lisinopril 5 mg tablet take 1 tablet by oral route  every day Reclast 5 mg/100 mL intravenous solution Annual  
B-12 OD 1pm  
amitriptyline 25 mg tablet take 1 tablet by oral route  every day at bedtime Keppra 500 mg tablet take 1 tablet by oral route 2 times every day Tylenol Arthritis Pain 650 mg tablet,extended release take 2 tablet by oral route  every 8 hours as needed swallowing whole with water. Do not break, crush, dissolve and/or chew. ibuprofen 200 mg capsule take 1 capsule by oral route  every 6 hours as needed  
dicyclomine 10 mg capsule take 1 capsule by oral route  every day before meals Lomotil 2.5 mg-0.025 mg tablet take 1 Tablet by oral route 4 times every day as needed Azulfidine 500 mg tablet take 2 Tablet by oral route 3 times every day after meals Coreg 12.5 mg tablet take 1 tablet by oral route 2 times every day with food  
baclofen 20 mg tablet take 1 tablet by oral route 4 times every day  
gabapentin 600 mg tablet take 1 tablet by oral route  every day  
hydrochlorothiazide 25 mg tablet take 1 tablet by oral route  every day  
hydrocodone 2.5 mg-acetaminophen 325 mg tablet take 1 tablet by oral route  every 4 - 6 hours as needed  
hydrocodone 5 mg-acetaminophen 325 mg tablet take 1 tablet by oral route  every 6 hours as needed for pain  
hydroxychloroquine 200 mg tablet take 1 tablet by oral route 2 times every day  
isosorbide mononitrate ER 60 mg tablet,extended release 24 hr take 1 tablet by oral route  every day in the morning Synthroid 125 mcg tablet take 1 tablet by oral route  every day  
methotrexate sodium 2.5 mg tablet take 1 tablet by oral route  every 12 hours for 3 doses given as a course once weekly  
pantoprazole 40 mg tablet,delayed release take 1 tablet by oral route  every day  
ranitidine 300 mg tablet take 1 tablet by oral route  every day at bedtime  
spironolactone 25 mg tablet take 1 tablet by oral route  every day  
sulfasalazine 500 mg tablet take 2 Tablet by oral route 2 times every day after meals  
trazodone 100 mg tablet take 1 tablet by oral route 2 times every day after meals  
venlafaxine ER 75 mg capsule,extended release 24 hr take 1 capsule by oral route  every day with food Vitamin D3 2,000 unit tablet take 1 by oral route  every day Coumadin 4 mg tablet take 1 tablet by oral route  every day Chamosyn 0.45 %-20 % topical ointment apply to affected area as needed  
fluticasone 50 mcg/actuation nasal spray,suspension spray 1 spray by intranasal route  every day in each nostril LiquiTears 1.4 % eye drops 1 drop in affected eyes four times daily  
ondansetron HCl 4 mg tablet take 1 Tablet by oral route  every 8 hours  
nitroglycerin 0.4 mg sublingual tablet place 1 tablet by sublingual route at the 1st sign of attack; may repeat every 5 min until relief; if pain persists after 3 tablets in 15 min, prompt medical attention is recommended Allergies: 
Ingredient Reaction (Severity) Medication Name Comment ADHESIVE TAPE Itching FLUCONAZOLE  Diflucan LATEX Anaphylaxis (fatal) VENOM-HONEY BEE Anaphylaxis (fatal) Reviewed, updated. Review of Systems System Neg/Pos Details ENMT Negative Hearing deficit. Eyes Negative Vision changes. Respiratory Positive Cough. Respiratory Negative Dyspnea. Cardio Negative Chest pain and Irregular heartbeat/palpitations. GI Positive Abdominal pain, Dysphagia.  Negative Dysuria and Urinary frequency. Endocrine Negative Cold intolerance and Heat intolerance. Neuro Negative Confusion/disorientation, Dizziness, Headache and Seizures. Hema/Lymph Negative Easy bleeding. VITAL SIGNS: 
BP mm/Hg Pulse/min Resp/min Temp F Height (Total in.) Weight (lbs.) Weight (oz.) BMI  
132/82 72 16  63.00 156.80  27.78 PHYSICAL EXAM: 
Pt is alert and oriented Lungs are clear Cor- rrr with murmur Medications Prescribed/Renewed (this encounter) Medication Directions Lovenox 40 mg/0.4 mL subcutaneous syringe inject 0.4 milliliter by subcutaneous route  every day x 5 days per instructions Assessment/Plan # Detail Type Description 1. Assessment Cough (R05). Plan Orders Further diagnostic evaluations ordered today include(s) EGD W Dilation to be performed. 2. Assessment LLQ abdominal pain (R10.32). Plan Orders Further diagnostic evaluations ordered today include(s) Abdomen and Pelvis CT WITHOUT and WITH  Contrast to be performed. 3. Assessment Nausea (R11.0). 4. Assessment Lymphocytic colitis (K52.832). 5. Assessment Mechanical heart valve present (Z95.2). 6. Assessment Anticoagulant long-term use (Z79.01). 7. Assessment Other dysphagia (R13.19). Provider Plan Patient's main problem is cough with eating. She had speech therapy evaluation that did not show a stricture or aspiration according to the patient. She refuses to take Reglan as an option due to the risk of tardive dyskinesia. We discussed the possible use of Domperidone. It is possible that empiric dilation would help. That will require a Lovenox bridge. She is advised that we cannot guarantee that dilation of the UES will resolve her cough. She is up to date on colon cancer screening. As far as her LLQ pain, the patient requested an MRI, but I have advised that in that area a CT would likely be more effective. The etiology is mostly likely adhesions, but she has had extensive evaluation with exploratory surgery years ago In search of a pheochromocytoma. I do not think a  follow up colonoscopy is indicated at this time. EGD and dilation. Counseling / Educational Factors: 
Items reviewed / discussed during today's visit: prior testing / procedures were reviewed; testing was discussed in detail; old records were reviewed; symptomatic care was discussed; advised to continue current treatment. Patient expressed understanding of instructions. Provider: Catalino Palmer  02/19/2019 2:54 PM  
Document generated by: Darien Price MD 02/19/2019

## 2019-03-28 ENCOUNTER — ANESTHESIA EVENT (OUTPATIENT)
Dept: ENDOSCOPY | Age: 74
End: 2019-03-28
Payer: MEDICARE

## 2019-03-28 RX ORDER — ONDANSETRON 2 MG/ML
4 INJECTION INTRAMUSCULAR; INTRAVENOUS ONCE
Status: CANCELLED | OUTPATIENT
Start: 2019-03-28 | End: 2019-03-28

## 2019-03-28 RX ORDER — MIDAZOLAM HYDROCHLORIDE 1 MG/ML
2 INJECTION, SOLUTION INTRAMUSCULAR; INTRAVENOUS
Status: CANCELLED | OUTPATIENT
Start: 2019-03-28 | End: 2019-03-29

## 2019-03-28 RX ORDER — HYDROMORPHONE HYDROCHLORIDE 2 MG/ML
0.5 INJECTION, SOLUTION INTRAMUSCULAR; INTRAVENOUS; SUBCUTANEOUS
Status: CANCELLED | OUTPATIENT
Start: 2019-03-28

## 2019-03-28 RX ORDER — ACETAMINOPHEN 500 MG
500 TABLET ORAL ONCE
Status: CANCELLED | OUTPATIENT
Start: 2019-03-28 | End: 2019-03-28

## 2019-03-28 RX ORDER — FENTANYL CITRATE 50 UG/ML
100 INJECTION, SOLUTION INTRAMUSCULAR; INTRAVENOUS AS NEEDED
Status: CANCELLED | OUTPATIENT
Start: 2019-03-28

## 2019-03-28 RX ORDER — SODIUM CHLORIDE, SODIUM LACTATE, POTASSIUM CHLORIDE, CALCIUM CHLORIDE 600; 310; 30; 20 MG/100ML; MG/100ML; MG/100ML; MG/100ML
75 INJECTION, SOLUTION INTRAVENOUS CONTINUOUS
Status: CANCELLED | OUTPATIENT
Start: 2019-03-28

## 2019-03-28 RX ORDER — DIPHENHYDRAMINE HYDROCHLORIDE 50 MG/ML
12.5 INJECTION, SOLUTION INTRAMUSCULAR; INTRAVENOUS ONCE
Status: CANCELLED | OUTPATIENT
Start: 2019-03-28 | End: 2019-03-28

## 2019-03-28 RX ORDER — NALOXONE HYDROCHLORIDE 0.4 MG/ML
0.1 INJECTION, SOLUTION INTRAMUSCULAR; INTRAVENOUS; SUBCUTANEOUS AS NEEDED
Status: CANCELLED | OUTPATIENT
Start: 2019-03-28 | End: 2019-03-28

## 2019-03-28 RX ORDER — OXYCODONE HYDROCHLORIDE 5 MG/1
10 TABLET ORAL
Status: CANCELLED | OUTPATIENT
Start: 2019-03-28 | End: 2019-03-29

## 2019-03-28 RX ORDER — OXYCODONE HYDROCHLORIDE 5 MG/1
5 TABLET ORAL
Status: CANCELLED | OUTPATIENT
Start: 2019-03-28 | End: 2019-03-29

## 2019-03-29 ENCOUNTER — HOSPITAL ENCOUNTER (OUTPATIENT)
Age: 74
Setting detail: OUTPATIENT SURGERY
Discharge: HOME | End: 2019-03-29
Attending: INTERNAL MEDICINE | Admitting: INTERNAL MEDICINE
Payer: MEDICARE

## 2019-03-29 ENCOUNTER — ANESTHESIA (OUTPATIENT)
Dept: ENDOSCOPY | Age: 74
End: 2019-03-29
Payer: MEDICARE

## 2019-03-29 VITALS
WEIGHT: 156 LBS | DIASTOLIC BLOOD PRESSURE: 73 MMHG | RESPIRATION RATE: 18 BRPM | HEART RATE: 80 BPM | OXYGEN SATURATION: 93 % | TEMPERATURE: 98 F | BODY MASS INDEX: 27.64 KG/M2 | SYSTOLIC BLOOD PRESSURE: 141 MMHG | HEIGHT: 63 IN

## 2019-03-29 PROCEDURE — 74011250636 HC RX REV CODE- 250/636

## 2019-03-29 PROCEDURE — 74011250636 HC RX REV CODE- 250/636: Performed by: INTERNAL MEDICINE

## 2019-03-29 PROCEDURE — 74011250636 HC RX REV CODE- 250/636: Performed by: ANESTHESIOLOGY

## 2019-03-29 PROCEDURE — 76040000025: Performed by: INTERNAL MEDICINE

## 2019-03-29 PROCEDURE — 76060000031 HC ANESTHESIA FIRST 0.5 HR: Performed by: INTERNAL MEDICINE

## 2019-03-29 PROCEDURE — 74011250637 HC RX REV CODE- 250/637: Performed by: INTERNAL MEDICINE

## 2019-03-29 RX ORDER — LIDOCAINE HYDROCHLORIDE 20 MG/ML
INJECTION, SOLUTION EPIDURAL; INFILTRATION; INTRACAUDAL; PERINEURAL AS NEEDED
Status: DISCONTINUED | OUTPATIENT
Start: 2019-03-29 | End: 2019-03-29 | Stop reason: HOSPADM

## 2019-03-29 RX ORDER — SODIUM CHLORIDE, SODIUM LACTATE, POTASSIUM CHLORIDE, CALCIUM CHLORIDE 600; 310; 30; 20 MG/100ML; MG/100ML; MG/100ML; MG/100ML
1000 INJECTION, SOLUTION INTRAVENOUS CONTINUOUS
Status: DISCONTINUED | OUTPATIENT
Start: 2019-03-29 | End: 2019-03-29 | Stop reason: HOSPADM

## 2019-03-29 RX ORDER — LIDOCAINE HYDROCHLORIDE 10 MG/ML
0.1 INJECTION INFILTRATION; PERINEURAL AS NEEDED
Status: DISCONTINUED | OUTPATIENT
Start: 2019-03-29 | End: 2019-03-29 | Stop reason: HOSPADM

## 2019-03-29 RX ORDER — PROPOFOL 10 MG/ML
INJECTION, EMULSION INTRAVENOUS AS NEEDED
Status: DISCONTINUED | OUTPATIENT
Start: 2019-03-29 | End: 2019-03-29 | Stop reason: HOSPADM

## 2019-03-29 RX ORDER — ONDANSETRON 2 MG/ML
4 INJECTION INTRAMUSCULAR; INTRAVENOUS ONCE
Status: COMPLETED | OUTPATIENT
Start: 2019-03-29 | End: 2019-03-29

## 2019-03-29 RX ORDER — SUCRALFATE 1 G/1
1 TABLET ORAL ONCE
Status: COMPLETED | OUTPATIENT
Start: 2019-03-29 | End: 2019-03-29

## 2019-03-29 RX ORDER — SODIUM CHLORIDE, SODIUM LACTATE, POTASSIUM CHLORIDE, CALCIUM CHLORIDE 600; 310; 30; 20 MG/100ML; MG/100ML; MG/100ML; MG/100ML
INJECTION, SOLUTION INTRAVENOUS
Status: DISCONTINUED | OUTPATIENT
Start: 2019-03-29 | End: 2019-03-29 | Stop reason: HOSPADM

## 2019-03-29 RX ORDER — PROPOFOL 10 MG/ML
INJECTION, EMULSION INTRAVENOUS
Status: DISCONTINUED | OUTPATIENT
Start: 2019-03-29 | End: 2019-03-29 | Stop reason: HOSPADM

## 2019-03-29 RX ADMIN — SODIUM CHLORIDE, SODIUM LACTATE, POTASSIUM CHLORIDE, CALCIUM CHLORIDE: 600; 310; 30; 20 INJECTION, SOLUTION INTRAVENOUS at 14:44

## 2019-03-29 RX ADMIN — PROPOFOL 140 MCG/KG/MIN: 10 INJECTION, EMULSION INTRAVENOUS at 14:46

## 2019-03-29 RX ADMIN — SUCRALFATE 1 G: 1 TABLET ORAL at 15:20

## 2019-03-29 RX ADMIN — ONDANSETRON 4 MG: 2 INJECTION INTRAMUSCULAR; INTRAVENOUS at 15:19

## 2019-03-29 RX ADMIN — LIDOCAINE HYDROCHLORIDE 80 MG: 20 INJECTION, SOLUTION EPIDURAL; INFILTRATION; INTRACAUDAL; PERINEURAL at 14:46

## 2019-03-29 RX ADMIN — PROPOFOL 80 MG: 10 INJECTION, EMULSION INTRAVENOUS at 14:46

## 2019-03-29 RX ADMIN — SODIUM CHLORIDE, SODIUM LACTATE, POTASSIUM CHLORIDE, AND CALCIUM CHLORIDE 1000 ML: 600; 310; 30; 20 INJECTION, SOLUTION INTRAVENOUS at 14:15

## 2019-03-29 NOTE — DISCHARGE INSTRUCTIONS
Gastrointestinal Esophagogastroduodenoscopy (EGD) - Upper Exam Discharge Instructions    1. Call Dr. Lise Tinsley at [phone] for any problems or questions. 2. Contact the doctor's office for follow up appointment as directed. 3. Medication may cause drowsiness for several hours, therefore:  · Do not drive or operate machinery for remainder of the day. · No alcohol today. · Do not make any important or legal decisions for 24 hours. · Do not sign any legal documents for 24 hours. 5. Ordinarily, you may resume regular diet and activity after exam unless otherwise specified by your physician. 6. For mild soreness in your throat you may use throat lozenges or warm  salt-water gargles as needed. Any additional instructions: Follow up as needed     PLAN: per Dr. Lise Tinsley  1. restart coumadin and lovenox  2.  F/U 3 months

## 2019-03-29 NOTE — ROUTINE PROCESS
Pt. Discharged to car by Cherylin Distance with caregiver . Vital signs stable. Able to tolerate PO fluids. Passing gas.  Seen by MD.

## 2019-03-29 NOTE — PROCEDURES
ESOPHAGOGASTRODUODENOSCOPY    DATE of PROCEDURE: 3/29/2019    PT NAME: mD Rivas     [id number]    MEDICATION:   MAC        INSTRUMENT: GIFH 190    SPECIAL PROCEDURE: 60 fr duran  BLOOD LOSS- 0 or min. SPEC- no  IMPLANT- none    PROCEDURE:  Standard EGD with duran dilation    ASSESSMENT:  1. schatzki's ring- expected amount of trauma  2. Large HH  3. Minimal trauma at UES    PLAN:   1. restart coumadin and lovenox  2.  F/U 3 months    Tiago Leone MD

## 2019-03-29 NOTE — ANESTHESIA PREPROCEDURE EVALUATION
Anesthetic History PONV Review of Systems / Medical History Patient summary reviewed and pertinent labs reviewed Pulmonary Within defined limits Neuro/Psych CVA: no residual symptoms Psychiatric history Cardiovascular Hypertension Valvular problems/murmurs (mechanical AVR, on anticoagulants) Exercise tolerance: <4 METS: Limited by hip pain Comments: Chronic Chest Pain S/p AVR 2004 Coumadin to Lovenox Bridge for procedure GI/Hepatic/Renal 
  
GERD Renal disease: CRI Endo/Other Hypothyroidism Arthritis Other Findings Comments: Fibromyalgia, chronic pain Physical Exam 
 
Airway Mallampati: II 
TM Distance: < 4 cm Neck ROM: normal range of motion Mouth opening: Normal 
 
 Cardiovascular Rhythm: regular Rate: normal 
Systolic click Dental 
 
Dentition: Edentulous Pulmonary Breath sounds clear to auscultation Abdominal 
 
 
 
 Other Findings Anesthetic Plan ASA: 3 Anesthesia type: total IV anesthesia Induction: Intravenous Anesthetic plan and risks discussed with: Patient

## 2019-03-29 NOTE — ANESTHESIA POSTPROCEDURE EVALUATION
Procedure(s): ESOPHAGOGASTRODUODENOSCOPY (EGD)/ 28 
ESOPHAGEAL DILATION. total IV anesthesia Anesthesia Post Evaluation Patient location during evaluation: bedside Patient participation: complete - patient participated Level of consciousness: responsive to verbal stimuli Pain management: satisfactory to patient Airway patency: patent Anesthetic complications: no 
Cardiovascular status: hemodynamically stable Respiratory status: spontaneous ventilation Hydration status: stable Vitals Value Taken Time /65 3/29/2019  3:28 PM  
Temp 36.7 °C (98 °F) 3/29/2019  2:58 PM  
Pulse 73 3/29/2019  3:32 PM  
Resp 16 3/29/2019  3:18 PM  
SpO2 100 % 3/29/2019  3:30 PM  
Vitals shown include unvalidated device data.

## 2019-03-29 NOTE — PROGRESS NOTES
Patients transportation home not in the waiting room. Patient called , per patient  stated she would be back in a little while.

## 2019-05-01 PROBLEM — Z01.818 PRE-OP EXAMINATION: Status: ACTIVE | Noted: 2019-05-01

## 2019-06-16 ENCOUNTER — APPOINTMENT (OUTPATIENT)
Dept: GENERAL RADIOLOGY | Age: 74
DRG: 291 | End: 2019-06-16
Attending: EMERGENCY MEDICINE
Payer: MEDICARE

## 2019-06-16 ENCOUNTER — HOSPITAL ENCOUNTER (INPATIENT)
Age: 74
LOS: 1 days | Discharge: HOSPICE | DRG: 291 | End: 2019-06-17
Attending: EMERGENCY MEDICINE | Admitting: INTERNAL MEDICINE
Payer: MEDICARE

## 2019-06-16 ENCOUNTER — APPOINTMENT (OUTPATIENT)
Dept: CT IMAGING | Age: 74
DRG: 291 | End: 2019-06-16
Attending: INTERNAL MEDICINE
Payer: MEDICARE

## 2019-06-16 DIAGNOSIS — J96.01 ACUTE RESPIRATORY FAILURE WITH HYPOXIA (HCC): ICD-10-CM

## 2019-06-16 DIAGNOSIS — I50.33 DIASTOLIC CHF, ACUTE ON CHRONIC (HCC): ICD-10-CM

## 2019-06-16 DIAGNOSIS — M35.1 MIXED CONNECTIVE TISSUE DISEASE (HCC): ICD-10-CM

## 2019-06-16 DIAGNOSIS — Z79.01 LONG TERM CURRENT USE OF ANTICOAGULANT THERAPY: ICD-10-CM

## 2019-06-16 DIAGNOSIS — I25.3 INTERATRIAL SEPTAL ANEURYSM WITH PFO: ICD-10-CM

## 2019-06-16 DIAGNOSIS — G89.29 OTHER CHRONIC PAIN: Chronic | ICD-10-CM

## 2019-06-16 DIAGNOSIS — D68.9 COAGULOPATHY (HCC): ICD-10-CM

## 2019-06-16 DIAGNOSIS — Q21.12 INTERATRIAL SEPTAL ANEURYSM WITH PFO: ICD-10-CM

## 2019-06-16 DIAGNOSIS — Z79.899 IMMUNOCOMPROMISED STATE DUE TO DRUG THERAPY (HCC): ICD-10-CM

## 2019-06-16 DIAGNOSIS — D84.821 IMMUNOCOMPROMISED STATE DUE TO DRUG THERAPY (HCC): ICD-10-CM

## 2019-06-16 DIAGNOSIS — J18.9 PNEUMONIA OF RIGHT LOWER LOBE DUE TO INFECTIOUS ORGANISM: Primary | ICD-10-CM

## 2019-06-16 PROBLEM — T50.905A MEDICATION INDUCED COAGULOPATHY (HCC): Status: ACTIVE | Noted: 2019-06-16

## 2019-06-16 LAB
ALBUMIN SERPL-MCNC: 3 G/DL (ref 3.2–4.6)
ALBUMIN/GLOB SERPL: 0.7 {RATIO} (ref 1.2–3.5)
ALP SERPL-CCNC: 62 U/L (ref 50–130)
ALT SERPL-CCNC: 27 U/L (ref 12–65)
ANION GAP SERPL CALC-SCNC: 7 MMOL/L (ref 7–16)
APPEARANCE UR: CLEAR
ARTERIAL PATENCY WRIST A: YES
AST SERPL-CCNC: 33 U/L (ref 15–37)
BACTERIA URNS QL MICRO: 0 /HPF
BASE DEFICIT BLD-SCNC: 2 MMOL/L
BASOPHILS # BLD: 0 K/UL (ref 0–0.2)
BASOPHILS NFR BLD: 0 % (ref 0–2)
BDY SITE: ABNORMAL
BILIRUB SERPL-MCNC: 0.3 MG/DL (ref 0.2–1.1)
BILIRUB UR QL: NEGATIVE
BODY TEMPERATURE: 98.6
BUN SERPL-MCNC: 20 MG/DL (ref 8–23)
CALCIUM SERPL-MCNC: 8.9 MG/DL (ref 8.3–10.4)
CHLORIDE SERPL-SCNC: 99 MMOL/L (ref 98–107)
CO2 BLD-SCNC: 24 MMOL/L
CO2 SERPL-SCNC: 28 MMOL/L (ref 21–32)
COLLECT TIME,HTIME: 1818
COLOR UR: YELLOW
CREAT SERPL-MCNC: 0.78 MG/DL (ref 0.6–1)
DIFFERENTIAL METHOD BLD: ABNORMAL
EOSINOPHIL # BLD: 0 K/UL (ref 0–0.8)
EOSINOPHIL NFR BLD: 0 % (ref 0.5–7.8)
EPI CELLS #/AREA URNS HPF: ABNORMAL /HPF
ERYTHROCYTE [DISTWIDTH] IN BLOOD BY AUTOMATED COUNT: 15.9 % (ref 11.9–14.6)
GAS FLOW.O2 O2 DELIVERY SYS: ABNORMAL L/MIN
GLOBULIN SER CALC-MCNC: 4.1 G/DL (ref 2.3–3.5)
GLUCOSE SERPL-MCNC: 144 MG/DL (ref 65–100)
GLUCOSE UR STRIP.AUTO-MCNC: NEGATIVE MG/DL
HCO3 BLD-SCNC: 22.6 MMOL/L (ref 22–26)
HCT VFR BLD AUTO: 37.5 % (ref 35.8–46.3)
HGB BLD-MCNC: 11.9 G/DL (ref 11.7–15.4)
HGB UR QL STRIP: ABNORMAL
IMM GRANULOCYTES # BLD AUTO: 0.1 K/UL (ref 0–0.5)
IMM GRANULOCYTES NFR BLD AUTO: 1 % (ref 0–5)
INR PPP: >6.8
KETONES UR QL STRIP.AUTO: NEGATIVE MG/DL
LACTATE BLD-SCNC: 1.17 MMOL/L (ref 0.5–1.9)
LEUKOCYTE ESTERASE UR QL STRIP.AUTO: NEGATIVE
LYMPHOCYTES # BLD: 0.3 K/UL (ref 0.5–4.6)
LYMPHOCYTES NFR BLD: 3 % (ref 13–44)
MCH RBC QN AUTO: 30.1 PG (ref 26.1–32.9)
MCHC RBC AUTO-ENTMCNC: 31.7 G/DL (ref 31.4–35)
MCV RBC AUTO: 94.9 FL (ref 79.6–97.8)
MONOCYTES # BLD: 0.3 K/UL (ref 0.1–1.3)
MONOCYTES NFR BLD: 3 % (ref 4–12)
NEUTS SEG # BLD: 8.3 K/UL (ref 1.7–8.2)
NEUTS SEG NFR BLD: 92 % (ref 43–78)
NITRITE UR QL STRIP.AUTO: NEGATIVE
NRBC # BLD: 0 K/UL (ref 0–0.2)
O2/TOTAL GAS SETTING VFR VENT: 21 %
PCO2 BLD: 35.7 MMHG (ref 35–45)
PH BLD: 7.41 [PH] (ref 7.35–7.45)
PH UR STRIP: 6 [PH] (ref 5–9)
PLATELET # BLD AUTO: 158 K/UL (ref 150–450)
PMV BLD AUTO: 9.1 FL (ref 9.4–12.3)
PO2 BLD: 46 MMHG (ref 75–100)
POTASSIUM SERPL-SCNC: 3.5 MMOL/L (ref 3.5–5.1)
PROCALCITONIN SERPL-MCNC: 0.4 NG/ML
PROT SERPL-MCNC: 7.1 G/DL (ref 6.3–8.2)
PROT UR STRIP-MCNC: 30 MG/DL
PROTHROMBIN TIME: 76.2 SEC (ref 11.7–14.5)
RBC # BLD AUTO: 3.95 M/UL (ref 4.05–5.2)
RBC #/AREA URNS HPF: ABNORMAL /HPF
SAO2 % BLD: 82 % (ref 95–98)
SERVICE CMNT-IMP: ABNORMAL
SERVICE CMNT-IMP: ABNORMAL
SODIUM SERPL-SCNC: 134 MMOL/L (ref 136–145)
SP GR UR REFRACTOMETRY: 1.02 (ref 1–1.02)
SPECIMEN TYPE: ABNORMAL
TSH SERPL DL<=0.005 MIU/L-ACNC: 0.13 UIU/ML
UROBILINOGEN UR QL STRIP.AUTO: 0.2 EU/DL (ref 0.2–1)
WBC # BLD AUTO: 9 K/UL (ref 4.3–11.1)
WBC URNS QL MICRO: ABNORMAL /HPF

## 2019-06-16 PROCEDURE — 74011000258 HC RX REV CODE- 258: Performed by: EMERGENCY MEDICINE

## 2019-06-16 PROCEDURE — 81001 URINALYSIS AUTO W/SCOPE: CPT

## 2019-06-16 PROCEDURE — 93005 ELECTROCARDIOGRAM TRACING: CPT | Performed by: EMERGENCY MEDICINE

## 2019-06-16 PROCEDURE — 87040 BLOOD CULTURE FOR BACTERIA: CPT

## 2019-06-16 PROCEDURE — 36600 WITHDRAWAL OF ARTERIAL BLOOD: CPT

## 2019-06-16 PROCEDURE — 85610 PROTHROMBIN TIME: CPT

## 2019-06-16 PROCEDURE — 83605 ASSAY OF LACTIC ACID: CPT

## 2019-06-16 PROCEDURE — 71045 X-RAY EXAM CHEST 1 VIEW: CPT

## 2019-06-16 PROCEDURE — 85025 COMPLETE CBC W/AUTO DIFF WBC: CPT

## 2019-06-16 PROCEDURE — 99285 EMERGENCY DEPT VISIT HI MDM: CPT | Performed by: EMERGENCY MEDICINE

## 2019-06-16 PROCEDURE — 74011250637 HC RX REV CODE- 250/637: Performed by: INTERNAL MEDICINE

## 2019-06-16 PROCEDURE — 74011250636 HC RX REV CODE- 250/636: Performed by: INTERNAL MEDICINE

## 2019-06-16 PROCEDURE — 77010033711 HC HIGH FLOW OXYGEN

## 2019-06-16 PROCEDURE — 96365 THER/PROPH/DIAG IV INF INIT: CPT | Performed by: EMERGENCY MEDICINE

## 2019-06-16 PROCEDURE — 65270000029 HC RM PRIVATE

## 2019-06-16 PROCEDURE — 74011250636 HC RX REV CODE- 250/636: Performed by: EMERGENCY MEDICINE

## 2019-06-16 PROCEDURE — 70450 CT HEAD/BRAIN W/O DYE: CPT

## 2019-06-16 PROCEDURE — 80053 COMPREHEN METABOLIC PANEL: CPT

## 2019-06-16 PROCEDURE — 84145 PROCALCITONIN (PCT): CPT

## 2019-06-16 PROCEDURE — 96375 TX/PRO/DX INJ NEW DRUG ADDON: CPT | Performed by: EMERGENCY MEDICINE

## 2019-06-16 PROCEDURE — 82803 BLOOD GASES ANY COMBINATION: CPT

## 2019-06-16 PROCEDURE — 84443 ASSAY THYROID STIM HORMONE: CPT

## 2019-06-16 RX ORDER — FAMOTIDINE 20 MG/1
20 TABLET, FILM COATED ORAL EVERY EVENING
Status: DISCONTINUED | OUTPATIENT
Start: 2019-06-16 | End: 2019-06-17 | Stop reason: HOSPADM

## 2019-06-16 RX ORDER — VANCOMYCIN/0.9 % SOD CHLORIDE 1.5G/250ML
1500 PLASTIC BAG, INJECTION (ML) INTRAVENOUS ONCE
Status: COMPLETED | OUTPATIENT
Start: 2019-06-16 | End: 2019-06-16

## 2019-06-16 RX ORDER — VENLAFAXINE 75 MG/1
225 TABLET ORAL DAILY
Status: DISCONTINUED | OUTPATIENT
Start: 2019-06-17 | End: 2019-06-16 | Stop reason: SDUPTHER

## 2019-06-16 RX ORDER — HYDROXYCHLOROQUINE SULFATE 200 MG/1
300 TABLET, FILM COATED ORAL DAILY
Status: DISCONTINUED | OUTPATIENT
Start: 2019-06-17 | End: 2019-06-17 | Stop reason: HOSPADM

## 2019-06-16 RX ORDER — SODIUM CHLORIDE 0.9 % (FLUSH) 0.9 %
5-40 SYRINGE (ML) INJECTION AS NEEDED
Status: DISCONTINUED | OUTPATIENT
Start: 2019-06-16 | End: 2019-06-17 | Stop reason: HOSPADM

## 2019-06-16 RX ORDER — FOLIC ACID 1 MG/1
1 TABLET ORAL DAILY
Status: DISCONTINUED | OUTPATIENT
Start: 2019-06-17 | End: 2019-06-17 | Stop reason: HOSPADM

## 2019-06-16 RX ORDER — DICYCLOMINE HYDROCHLORIDE 10 MG/1
10 CAPSULE ORAL
Status: DISCONTINUED | OUTPATIENT
Start: 2019-06-16 | End: 2019-06-17 | Stop reason: HOSPADM

## 2019-06-16 RX ORDER — NITROGLYCERIN 0.4 MG/1
0.4 TABLET SUBLINGUAL
Status: DISCONTINUED | OUTPATIENT
Start: 2019-06-16 | End: 2019-06-17 | Stop reason: HOSPADM

## 2019-06-16 RX ORDER — AMITRIPTYLINE HYDROCHLORIDE 10 MG/1
10 TABLET, FILM COATED ORAL
Status: DISCONTINUED | OUTPATIENT
Start: 2019-06-16 | End: 2019-06-16

## 2019-06-16 RX ORDER — POLYVINYL ALCOHOL 14 MG/ML
1 SOLUTION/ DROPS OPHTHALMIC AS NEEDED
Status: DISCONTINUED | OUTPATIENT
Start: 2019-06-16 | End: 2019-06-17 | Stop reason: HOSPADM

## 2019-06-16 RX ORDER — HYDROCODONE BITARTRATE AND ACETAMINOPHEN 5; 325 MG/1; MG/1
1 TABLET ORAL
Status: DISCONTINUED | OUTPATIENT
Start: 2019-06-16 | End: 2019-06-17 | Stop reason: HOSPADM

## 2019-06-16 RX ORDER — PREDNISONE 10 MG/1
5 TABLET ORAL
Status: DISCONTINUED | OUTPATIENT
Start: 2019-06-17 | End: 2019-06-17 | Stop reason: HOSPADM

## 2019-06-16 RX ORDER — PANTOPRAZOLE SODIUM 40 MG/1
40 TABLET, DELAYED RELEASE ORAL
Status: DISCONTINUED | OUTPATIENT
Start: 2019-06-17 | End: 2019-06-17 | Stop reason: HOSPADM

## 2019-06-16 RX ORDER — ALBUTEROL SULFATE 0.83 MG/ML
2.5 SOLUTION RESPIRATORY (INHALATION)
Status: DISCONTINUED | OUTPATIENT
Start: 2019-06-16 | End: 2019-06-17 | Stop reason: HOSPADM

## 2019-06-16 RX ORDER — SODIUM CHLORIDE 9 MG/ML
75 INJECTION, SOLUTION INTRAVENOUS CONTINUOUS
Status: DISCONTINUED | OUTPATIENT
Start: 2019-06-16 | End: 2019-06-17

## 2019-06-16 RX ORDER — ACETAMINOPHEN 500 MG
1000 TABLET ORAL EVERY 8 HOURS
Status: DISCONTINUED | OUTPATIENT
Start: 2019-06-16 | End: 2019-06-17 | Stop reason: HOSPADM

## 2019-06-16 RX ORDER — POLYVINYL ALCOHOL 14 MG/ML
1 SOLUTION/ DROPS OPHTHALMIC AS NEEDED
COMMUNITY

## 2019-06-16 RX ORDER — SODIUM CHLORIDE 0.9 % (FLUSH) 0.9 %
5-40 SYRINGE (ML) INJECTION EVERY 8 HOURS
Status: DISCONTINUED | OUTPATIENT
Start: 2019-06-16 | End: 2019-06-17 | Stop reason: HOSPADM

## 2019-06-16 RX ORDER — LEVOTHYROXINE SODIUM 125 UG/1
125 TABLET ORAL
Status: DISCONTINUED | OUTPATIENT
Start: 2019-06-17 | End: 2019-06-17 | Stop reason: HOSPADM

## 2019-06-16 RX ORDER — CARVEDILOL 6.25 MG/1
12.5 TABLET ORAL 2 TIMES DAILY WITH MEALS
Status: DISCONTINUED | OUTPATIENT
Start: 2019-06-16 | End: 2019-06-17

## 2019-06-16 RX ORDER — VANCOMYCIN/0.9 % SOD CHLORIDE 1.5G/250ML
1500 PLASTIC BAG, INJECTION (ML) INTRAVENOUS
Status: DISCONTINUED | OUTPATIENT
Start: 2019-06-17 | End: 2019-06-17

## 2019-06-16 RX ORDER — SULFASALAZINE 500 MG/1
1000 TABLET ORAL
Status: DISCONTINUED | OUTPATIENT
Start: 2019-06-16 | End: 2019-06-17 | Stop reason: HOSPADM

## 2019-06-16 RX ORDER — LEVETIRACETAM 500 MG/1
500 TABLET ORAL 2 TIMES DAILY
Status: DISCONTINUED | OUTPATIENT
Start: 2019-06-16 | End: 2019-06-17 | Stop reason: HOSPADM

## 2019-06-16 RX ADMIN — CARVEDILOL 12.5 MG: 6.25 TABLET, FILM COATED ORAL at 22:23

## 2019-06-16 RX ADMIN — Medication 5 ML: at 19:09

## 2019-06-16 RX ADMIN — ACETAMINOPHEN 1000 MG: 500 TABLET, FILM COATED ORAL at 22:21

## 2019-06-16 RX ADMIN — TOBRAMYCIN SULFATE 400 MG: 40 INJECTION, SOLUTION INTRAMUSCULAR; INTRAVENOUS at 15:45

## 2019-06-16 RX ADMIN — LEVETIRACETAM 500 MG: 500 TABLET, FILM COATED ORAL at 22:18

## 2019-06-16 RX ADMIN — FAMOTIDINE 20 MG: 20 TABLET, FILM COATED ORAL at 22:19

## 2019-06-16 RX ADMIN — VANCOMYCIN HYDROCHLORIDE 1500 MG: 10 INJECTION, POWDER, LYOPHILIZED, FOR SOLUTION INTRAVENOUS at 16:29

## 2019-06-16 RX ADMIN — SODIUM CHLORIDE 75 ML/HR: 900 INJECTION, SOLUTION INTRAVENOUS at 19:11

## 2019-06-16 RX ADMIN — SODIUM CHLORIDE 500 ML: 900 INJECTION, SOLUTION INTRAVENOUS at 15:42

## 2019-06-16 RX ADMIN — PIPERACILLIN SODIUM,TAZOBACTAM SODIUM 4.5 G: 4; .5 INJECTION, POWDER, FOR SOLUTION INTRAVENOUS at 15:43

## 2019-06-16 NOTE — PROGRESS NOTES
Pt arrived to floor. Assessment completed. Pt alert to person only Pt is extremely drowsy awakens to verbal stimuli. Iv present and infusing without difficulty. Lungs diminished and coarse bilaterally. Heart sounds regular. Abdomen soft and non tender with active bowel sounds. Pt very drowsy but asking for pain medication for back pain and then falls back to sleep. Call light close and bed alarm on.

## 2019-06-16 NOTE — PROGRESS NOTES
ABG drawn and resulted to RN. Airvo set up in room 40 LPM and 50%. Patient transported to CT via [facility] [place] [address].

## 2019-06-16 NOTE — PROGRESS NOTES
06/16/19 1800   Dual Skin Pressure Injury Assessment   Dual Skin Pressure Injury Assessment WDL   Second Care Provider (Based on [address] [place] [address]) Alychia  (scattered BLE bruising, left great toe dried blood)

## 2019-06-16 NOTE — PROGRESS NOTES
TRANSFER - IN REPORT:    Verbal report received from [facility], RN(name) on [employer]  being received from ER(unit) for routine progression of care      Report consisted of patients Situation, Background, Assessment and   Recommendations(SBAR). Information from the following report(s) SBAR was reviewed with the receiving nurse. Opportunity for questions and clarification was provided.

## 2019-06-16 NOTE — H&P
HOSPITALIST H&P  NAME:  Kumar Green   Age:  68 y.o.  :   1945   MRN:   [id number]  PCP: Christian Jimenez MD  Consulting MD:  Treatment Team: Attending Provider: Ivonne Vasquez MD; Consulting Provider: Elisa Weeks MD  HPI:   Anne Katz is a 67 yo F who resides at [facility], who was sent via EMS for hypoxic respiratory failure in setting of pneumonia and failing outpatient antibiotics (amoxicillin). Pertinent PMH:  Mixed connective tissue disease on several immunosuppressive agents (sulfasalazine, methotrexate, plaquenil, prednisone), chronic warfarin therapy for history of mechanical aortic valve replacement, and chronic pain  She is unable to provide history, but upon speaking with ER staff and staff at [facility], she started having worsened mental status and was found to be hypoxic with oxygen saturation in the low 80s. CXR showed R base pneumonia. Started on supplemental oxygen and given tobra/vanc/zosyn. She is confused and unable to provide history. Able to state her name; otherwise, answers 'yeah' to other questions and will not answer open ended questions.     Complete ROS done and is as stated in HPI or otherwise negative  Past Medical History:   Diagnosis Date    A-fib ([address]     Acquired hypothyroidism     Acute CVA (cerebrovascular accident) ([address] 2014    Acute [facility]unar infarction [facility]     Anxiety state, unspecified     Aortic valve stenosis, congenital 2016    CAD (coronary artery disease)     Cancer (HCC)     pre cancerous cells in breast     Candida Esophagitis 2010    Chronic kidney disease     Chronic pain     Depression     Duodenal ulcer     Dysphagia 2016    Embolism and thrombosis of unspecified artery ([address] 2014    Fibromyalgia     History of anemia 2012    GI blood loss    History of drug overdose 2016    narcotics    History of encephalopathy 2010    History of fracture of hip 03/10/2012    femoral neck    History of mastectomy     bilat    Hypertension     controlled with medication    Immunosuppressed status ([facility] 6/20/2014    Long term (current) use of anticoagulants     MCTD (mixed connective tissue disease) ([facility]     seeing Dr. Piter Armstrong, on methotrexate and Plaquenil    Nausea & vomiting     Osteoarthritis     Other ill-defined cerebrovascular disease     Pain in joint, pelvic region and thigh     Personal history of fall     Postmenopausal atrophic vaginitis     Primary localized osteoarthrosis, pelvic region and thigh     PUD (peptic ulcer disease) 2009    PVD (peripheral vascular disease) ([facility]     Reflux esophagitis     S/P AVR (aortic valve replacement)     [facility]    Seasonal allergic rhinitis due to pollen     Seizures ([facility]     2 years ago     Sleep disturbance     Symptomatic menopausal or female climacteric states     Thromboembolus [facility]) 2007    right arm , right leg    Unspecified disorder of liver     Urticaria     Mild on back      Past Surgical History:   Procedure Laterality Date    ABDOMEN SURGERY [address]    exploratory tubes & ovaries removed    EGD  3/29/2019         HX ACL RECONSTRUCTION Left 1979    HX AORTIC VALVE REPLACEMENT  2004    [facility] Aortic valve Valve    HX AORTIC VALVE REPLACEMENT      HX CARPAL TUNNEL RELEASE Right 1995    HX CATARACT REMOVAL Right 3/20/2014    [facility]    HX CHOLECYSTECTOMY      HX COLONOSCOPY  9/25/13    Internal Hemorrhoids. Bx reveals Lymphocytic colitis. No further colonscopies will be scheduled due to concominant medical problems.  HX COLONOSCOPY  2010    Internal hemorrhoids. Repeated 2013, no further colonoscopies planned. N[facility].  HX HYSTERECTOMY  1975    HX MASTECTOMY Bilateral 2000    Precancerous. Gwendolyn Snooks.     HX OTHER SURGICAL      right brachial  thrombosis    HX TONSIL AND ADENOIDECTOMY      HX TONSILLECTOMY      HX TUMOR REMOVAL  1986    Malignant tumor of colon.  REVISE TOTAL HIP REPLACEMENT Right 13    TOTAL HIP ARTHROPLASTY Right 2012, 3/11/12, 2013      Prior to Admission Medications   Prescriptions Last Dose Informant Patient Reported? Taking?   acetaminophen (TYLENOL EXTRA STRENGTH) 500 mg tablet   Yes Yes   Sig: Take 1,000 mg by mouth every eight (8) hours. amitriptyline (ELAVIL) 10 mg tablet   Yes Yes   Sig: Take  by mouth nightly. baclofen (LIORESAL) 20 mg tablet   No Yes   Sig: Take 1 Tab by mouth four (4) times daily for 30 days. Patient taking differently: Take 20 mg by mouth three (3) times daily. carvedilol (COREG) 12.5 mg tablet   Yes Yes   Sig: Take  by mouth two (2) times daily (with meals). cholecalciferol, vitamin D3, (VITAMIN D3) 2,000 unit tab   Yes Yes   Sig: Take  by mouth daily. cyanocobalamin 1,000 mcg tablet   Yes Yes   Sig: Take 1,000 mcg by mouth daily. dicyclomine (BENTYL) 10 mg capsule   No Yes   Sig: Take 1 Cap by mouth daily as needed. diphenoxylate-atropine (LOMOTIL) 2.5-0.025 mg per tablet   Yes Yes   Sig: Take  by mouth four (4) times daily as needed for Diarrhea. fluticasone (FLONASE ALLERGY RELIEF) 50 mcg/actuation nasal spray   Yes Yes   Si Sprays by Both Nostrils route daily as needed for Rhinitis. folic acid (FOLVITE) 1 mg tablet   No Yes   Sig: Take 1 Tab by mouth daily. hydroCHLOROthiazide (HYDRODIURIL) 25 mg tablet   No Yes   Sig: Take 1 Tab by mouth daily. hydrocodone/acetaminophen (NORCO PO)   Yes Yes   Sig: Take 5 mg by mouth three (3) times daily as needed for Pain.   hydroxychloroquine (PLAQUENIL) 200 mg tablet   No Yes   Sig: Take 1.5 Tabs by mouth daily. levETIRAcetam (KEPPRA) 500 mg tablet   Yes Yes   Sig: Take 500 mg by mouth two (2) times a day. levothyroxine (SYNTHROID) 125 mcg tablet   Yes Yes   Sig: Take  by mouth Daily (before breakfast). methotrexate (RHEUMATREX) 2.5 mg tablet   No Yes   Sig: Take 8 tablets once a week.   Must have labs done before refills can be given. nitroglycerin (NITROSTAT) 0.4 mg SL tablet   No Yes   Si Tab by SubLINGual route every five (5) minutes as needed for Chest Pain. ondansetron hcl (ZOFRAN, AS HYDROCHLORIDE,) 4 mg tablet   No Yes   Sig: Take 1 Tab by mouth every eight (8) hours as needed for Nausea. pantoprazole (PROTONIX) 40 mg tablet   No Yes   Sig: Take 1 Tab by mouth daily. polyvinyl alcohol (LIQUIFILM TEARS) 1.4 % ophthalmic solution   Yes Yes   Sig: Administer 1 Drop to both eyes as needed. Indications: dry eye   predniSONE (DELTASONE) 5 mg tablet   Yes Yes   Sig: Take 5 mg by mouth daily. raNITIdine (ZANTAC) 150 mg tablet   Yes Yes   Sig: Take 150 mg by mouth nightly. spironolactone (ALDACTONE) 25 mg tablet   No Yes   Sig: Take 1 Tab by mouth two (2) times a day. sulfaSALAzine (AZULFIDINE) 500 mg tablet   Yes Yes   Sig: Take 1,000 mg by mouth three (3) times daily as needed. traZODone (DESYREL) 100 mg tablet   No Yes   Sig: Take 2 hs prn sleep   venlafaxine (EFFEXOR) 75 mg tablet   Yes Yes   Sig: Take 225 mg by mouth daily. warfarin (COUMADIN) 2 mg tablet   No Yes   Si mg Tues, Thurs, Sat and 4 mg all other days. Starting    Patient taking differently: 2 mg on  and ; 4 mg all other days  Indications: Blood Clot caused by Artificial Heart Valve   zoledronic acid/mannitol-water (RECLAST IV)   Yes No   Sig: by IntraVENous route Once a year.  Last infusion was 10/16/18      Facility-Administered Medications: None     Allergies   Allergen Reactions    Latex Anaphylaxis    Bee Sting [Sting, Bee] Shortness of Breath     anaphylatics    Adhesive Rash     Including Coban wrap    Diflucan [Fluconazole] Other (comments)     Increased INR per pt       Social History     Tobacco Use    Smoking status: Former Smoker     Packs/day: 0.25     Years: 10.00     Pack years: 2.50     Last attempt to quit: 3/23/1976     Years since quittin.2    Smokeless tobacco: Never Used    Tobacco comment: in 1970s   Substance Use Topics    Alcohol use: No     Alcohol/week: 0.0 oz      Family History   Problem Relation Age of Onset    Stroke Mother     Heart Disease Father     Hypertension Father     Heart Disease Sister     Seizures Brother       Objective:     Visit Vitals  /72 (BP 1 Location: Right arm, BP Patient Position: At rest)   Pulse 73   Temp 98.7 °F (37.1 °C)   Resp 19   Ht 5' 5\" (1.651 m)   Wt 68 kg (150 lb)   SpO2 94%   BMI 24.96 kg/m²      Temp (24hrs), Av.6 °F (37 °C), Min:98.5 °F (36.9 °C), Max:98.7 °F (37.1 °C)    Patient Vitals for the past 24 hrs:   Temp Pulse Resp BP SpO2   19 1747 98.7 °F (37.1 °C) 73 19 152/72 94 %   19 1702  73 16  100 %   19 1633  70 18  100 %   19 1630  71 19  98 %   19 1550  75 18 120/59 97 %   19 1436 98.5 °F (36.9 °C) 82 26 117/58 (!) 88 %       Oxygen Therapy  O2 Sat (%): 94 % (19 1747)  Pulse via Oximetry: 75 beats per minute (19 1702)  O2 Device: Nasal cannula (19 1443)  O2 Flow Rate (L/min): 5 l/min (19 1443)  Physical Exam:  General:    Alert but groggy, elderly    Head:   Normocephalic, without obvious abnormality, atraumatic. Nose:  Nares normal. No drainage or sinus tenderness. Lungs:   Rhonchi bilaterally (worse on R), + cough with deep inspiration, tachypneic at times  Heart:   Regular rate and rhythm,  no murmur, rub or gallop. Abdomen:   Soft, non-tender. Not distended. Bowel sounds normal.   Extremities: No cyanosis. No edema.  No clubbing  Skin:     Texture, turgor normal. Old bruising on legs  Neurologic: Alert but groggy, oriented to self  Data Review:   Recent Results (from the past 24 hour(s))   POC LACTIC ACID    Collection Time: 19  2:50 PM   Result Value Ref Range    Lactic Acid (POC) 1.17 0.5 - 1.9 mmol/L   PROTHROMBIN TIME + INR    Collection Time: 19  2:53 PM   Result Value Ref Range    Prothrombin time 76.2 (H) 11.7 - 14.5 sec    INR >6.8 (HH)    CBC WITH AUTOMATED DIFF    Collection Time: 06/16/19  2:54 PM   Result Value Ref Range    WBC 9.0 4.3 - 11.1 K/uL    RBC 3.95 (L) 4.05 - 5.2 M/uL    HGB 11.9 11.7 - 15.4 g/dL    HCT 37.5 35.8 - 46.3 %    MCV 94.9 79.6 - 97.8 FL    MCH 30.1 26.1 - 32.9 PG    MCHC 31.7 31.4 - 35.0 g/dL    RDW 15.9 (H) 11.9 - 14.6 %    PLATELET 473 583 - 752 K/uL    MPV 9.1 (L) 9.4 - 12.3 FL    ABSOLUTE NRBC 0.00 0.0 - 0.2 K/uL    DF AUTOMATED      NEUTROPHILS 92 (H) 43 - 78 %    LYMPHOCYTES 3 (L) 13 - 44 %    MONOCYTES 3 (L) 4.0 - 12.0 %    EOSINOPHILS 0 (L) 0.5 - 7.8 %    BASOPHILS 0 0.0 - 2.0 %    IMMATURE GRANULOCYTES 1 0.0 - 5.0 %    ABS. NEUTROPHILS 8.3 (H) 1.7 - 8.2 K/UL    ABS. LYMPHOCYTES 0.3 (L) 0.5 - 4.6 K/UL    ABS. MONOCYTES 0.3 0.1 - 1.3 K/UL    ABS. EOSINOPHILS 0.0 0.0 - 0.8 K/UL    ABS. BASOPHILS 0.0 0.0 - 0.2 K/UL    ABS. IMM. GRANS. 0.1 0.0 - 0.5 K/UL   METABOLIC PANEL, COMPREHENSIVE    Collection Time: 06/16/19  2:54 PM   Result Value Ref Range    Sodium 134 (L) 136 - 145 mmol/L    Potassium 3.5 3.5 - 5.1 mmol/L    Chloride 99 98 - 107 mmol/L    CO2 28 21 - 32 mmol/L    Anion gap 7 7 - 16 mmol/L    Glucose 144 (H) 65 - 100 mg/dL    BUN 20 8 - 23 MG/DL    Creatinine 0.78 0.6 - 1.0 MG/DL    GFR est AA >60 >60 ml/min/1.73m2    GFR est non-AA >60 >60 ml/min/1.73m2    Calcium 8.9 8.3 - 10.4 MG/DL    Bilirubin, total 0.3 0.2 - 1.1 MG/DL    ALT (SGPT) 27 12 - 65 U/L    AST (SGOT) 33 15 - 37 U/L    Alk.  phosphatase 62 50 - 130 U/L    Protein, total 7.1 6.3 - 8.2 g/dL    Albumin 3.0 (L) 3.2 - 4.6 g/dL    Globulin 4.1 (H) 2.3 - 3.5 g/dL    A-G Ratio 0.7 (L) 1.2 - 3.5     URINALYSIS W/ RFLX MICROSCOPIC    Collection Time: 06/16/19  3:23 PM   Result Value Ref Range    Color YELLOW      Appearance CLEAR      Specific gravity 1.021 1.001 - 1.023      pH (UA) 6.0 5.0 - 9.0      Protein 30 (A) NEG mg/dL    Glucose NEGATIVE  mg/dL    Ketone NEGATIVE  NEG mg/dL    Bilirubin NEGATIVE  NEG      Blood SMALL (A) NEG      Urobilinogen 0.2 0.2 - 1.0 EU/dL    Nitrites NEGATIVE  NEG      Leukocyte Esterase NEGATIVE  NEG      WBC 0-3 0 /hpf    RBC 3-5 0 /hpf    Epithelial cells 0-3 0 /hpf    Bacteria 0 0 /hpf     Imaging /Procedures /Studies   XR CHEST PORT   Final Result   Impression: Mild right basilar airspace opacity would raise suspicion for   pneumonia given the provided history. Assessment and Plan: Active Hospital Problems    Diagnosis Date Noted    Pneumonia    - Admit to medical bed    - Vanc/zosyn/tobramycin- pharmacy to dose    - Supplemental oxygen; wean as tolerated    - Consult pulmonology given pneumonia in immunosuppressed state    - Speech therapy for swallowing eval to assess for aspiration   06/16/2019    Acute respiratory failure with hypoxia (HCC)    - Supplemental oxygen    - Due to pneumonia   06/16/2019    Immunocompromised state due to drug therapy    - Treat for healthcare associated pneumonia   06/16/2019    Recurrent major depressive disorder, in partial remission ([facility]    - Hold trazodone; continue other anti-depressant   04/14/2017    Seizure disorder ([facility]    - Continue keppra   12/13/2016    Long term current use of anticoagulant therapy       - Coagulopathy from warfarin    - Due to mechanical aortic valve in place, will hold warfarin and let INR drift down as no bleeding at present   01/17/2013    S/P AVR (aortic valve replacement)    - Coumadin- pharmacy to dose- coumadin on hold for now   02/04/2010    Chronic pain    - Continue acetaminophen and prn norco    Polypharmacy- noted    HTN- hold HCTZ due to mild hyponatremia    Mild hyponatremia- gentle IVF until taking PO and monitor    Hypothyroidism- Check TSH and continue levothyroxine 02/04/2010         Code Status: DNR per paperwork from [id number] note, I spoke with Patty, staff member at [facility]. She states that Ms. Conor Hartley is far from baseline and can usually tell staff her meds and when she takes them.    Estimated LOS:  > 2 midnights    Signed By: Edmond Gamble.  Scar Estrella MD     June 16, 2019

## 2019-06-16 NOTE — PROGRESS NOTES
Due to altered mental status and supratherapeutic INR, will obtain CT head to evaluate for intracranial bleed. Will also check ABG.     Yolie Tafoya MD

## 2019-06-16 NOTE — PROGRESS NOTES
Pharmacokinetic Consult to Pharmacist    Tereza Joe is a 68 y.o. female being treated for HAP with pip-tazo, tobramycin, and vancomycin. Height: 5' 5\" (165.1 cm)  Weight: 68 kg (150 lb)  Lab Results   Component Value Date/Time    BUN 20 06/16/2019 02:54 PM    Creatinine 0.78 06/16/2019 02:54 PM    WBC 9.0 06/16/2019 02:54 PM    Lactic acid 0.8 03/02/2016 08:05 PM    Lactic acid 1.3 03/02/2016 07:00 PM      Estimated Creatinine Clearance: 58.7 mL/min (based on SCr of 0.78 mg/dL). Day 1 of therapy. Will start vancomycin 1.5 grams q18h and tobramycin 475 mg q36h. Will order a 10-hr random tobra level tomorrow morning and reassess dosing interval per nomogram.  Vancomycin levels to be ordered as indicated. Will continue to follow patient.       Thank you,  Cristofer Mar, PharmD  [phone]

## 2019-06-16 NOTE — ED PROVIDER NOTES
Patient is a nursing home resident with dementia. She was recently treated for pneumonia at the nursing home and started on an antibiotic. Per EMS, her respiratory status deteriorated thus EMS was called to bring her here. She is not on oxygen at the nursing home, was hypoxic per EMS. The patient is unable to provide a meaningful history due to her dementia. Elements of this note were created using speech recognition software. As such, errors of speech recognition may be present.            Past Medical History:   Diagnosis Date    A-fib [facility])     Acquired hypothyroidism     Acute CVA (cerebrovascular accident) ([address] 6/21/2014    Acute lacunar infarction ([address]     Anxiety state, unspecified     Aortic valve stenosis, congenital 2/19/2016    CAD (coronary artery disease)     Cancer (HCC)     pre cancerous cells in breast     Candida Esophagitis 2/11/2010    Chronic kidney disease     Chronic pain     Depression     Duodenal ulcer     Dysphagia 2/19/2016    Embolism and thrombosis of unspecified artery ([address] 9/12/2014    Fibromyalgia     History of anemia 03/12/2012    GI blood loss    History of drug overdose 09/2016    narcotics    History of encephalopathy 02/04/2010    History of fracture of hip 03/10/2012    femoral neck    History of mastectomy     bilat    Hypertension     controlled with medication    Immunosuppressed status ([address] 6/20/2014    Long term (current) use of anticoagulants     MCTD (mixed connective tissue disease) ([address]     seeing Dr. Donaldo Gautam, on methotrexate and Plaquenil    Nausea & vomiting     Osteoarthritis     Other ill-defined cerebrovascular disease     Pain in joint, pelvic region and thigh     Personal history of fall     Postmenopausal atrophic vaginitis     Primary localized osteoarthrosis, pelvic region and thigh     PUD (peptic ulcer disease) 2009    PVD (peripheral vascular disease) ([address]     Reflux esophagitis     S/P AVR (aortic valve replacement)     [facility]    Seasonal allergic rhinitis due to pollen     Seizures ([address]     2 years ago     Sleep disturbance     Symptomatic menopausal or female climacteric states     Thromboembolus [facility]) 2007    right arm , right leg    Unspecified disorder of liver     Urticaria     Mild on back       Past Surgical History:   Procedure Laterality Date    ABDOMEN SURGERY [address]    exploratory tubes & ovaries removed    EGD  3/29/2019         HX ACL RECONSTRUCTION Left 1979    HX AORTIC VALVE REPLACEMENT  2004    [facility] Aortic valve Valve    HX AORTIC VALVE REPLACEMENT      HX CARPAL TUNNEL RELEASE Right 1995    HX CATARACT REMOVAL Right 3/20/2014    [facility]    HX CHOLECYSTECTOMY      HX COLONOSCOPY  9/25/13    Internal Hemorrhoids. Bx reveals Lymphocytic colitis. No further colonscopies will be scheduled due to concominant medical problems.  HX COLONOSCOPY  2010    Internal hemorrhoids. Repeated 2013, no further colonoscopies planned. [facility].  HX HYSTERECTOMY  1975    HX MASTECTOMY Bilateral 2000    Precancerous. Kenyatta Brood.  HX OTHER SURGICAL      right brachial  thrombosis    HX TONSIL AND ADENOIDECTOMY      HX TONSILLECTOMY      HX TUMOR REMOVAL  1986    Malignant tumor of colon.     REVISE TOTAL HIP REPLACEMENT Right 1/17/13    TOTAL HIP ARTHROPLASTY Right 12/2012, 3/11/12, 2/2013         Family History:   Problem Relation Age of Onset    Stroke Mother     Heart Disease Father     Hypertension Father     Heart Disease Sister     Seizures Brother        Social History     Socioeconomic History    Marital status:      Spouse name: Norm    Number of children: Not on file    Years of education: Not on file    Highest education level: Not on file   Occupational History    Occupation: retired   Social Needs    Financial resource strain: Not on file    Food insecurity:     Worry: Not on file     Inability: Not on file   [employer] needs: Medical: Not on file     Non-medical: Not on file   Tobacco Use    Smoking status: Former Smoker     Packs/day: 0.25     Years: 10.00     Pack years: 2.50     Last attempt to quit: 3/23/1976     Years since quittin.2    Smokeless tobacco: Never Used    Tobacco comment: in 80s   Substance and Sexual Activity    Alcohol use: No     Alcohol/week: 0.0 oz    Drug use: Yes     Types: Prescription     Comment: Norco    Sexual activity: Never   Lifestyle    Physical activity:     Days per week: Not on file     Minutes per session: Not on file    Stress: Not on file   Relationships    Social connections:     Talks on phone: Not on file     Gets together: Not on file     Attends Anabaptist service: Not on file     Active member of club or organization: Not on file     Attends meetings of clubs or organizations: Not on file     Relationship status: Not on file    Intimate partner violence:     Fear of current or ex partner: Not on file     Emotionally abused: Not on file     Physically abused: Not on file     Forced sexual activity: Not on file   Other Topics Concern    Not on file   Social History Narrative    Patient lives at [facility]. She denies any in-house stairs. 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 Activity reported as brain games, reading, and talking with friends. Exercise is limited, uses walker or WC                         ALLERGIES: Latex; Bee sting [sting, bee]; Adhesive; and Diflucan [fluconazole]    Review of Systems   Constitutional: Negative for chills and fever. Gastrointestinal: Negative for nausea and vomiting. All other systems reviewed and are negative.       Vitals:    19 1436   BP: 117/58   Pulse: 82   Resp: 26   Temp: 98.5 °F (36.9 °C)   SpO2: (!) 88%   Weight: 68 kg (150 lb)   Height: 5' 5\" (1.651 m)            Physical Exam   Constitutional: She appears well-developed and well-nourished. HENT:   Head: Normocephalic and atraumatic. Eyes: Pupils are equal, round, and reactive to light. Conjunctivae are normal.   Neck: Normal range of motion. Neck supple. Pulmonary/Chest: She has no wheezes. She has no rales. Abdominal: Soft. She exhibits no distension. Musculoskeletal: She exhibits no edema or tenderness. Neurological: She is alert. Oriented to person only   Skin: Skin is warm and dry. Psychiatric: She has a normal mood and affect. Her behavior is normal.   Nursing note and vitals reviewed. MDM  Number of Diagnoses or Management Options  Acute respiratory failure with hypoxia [facility]): new and requires workup  Coagulopathy [facility]): new and requires workup  Pneumonia of right lower lobe due to infectious organism [facility]): new and requires workup  Diagnosis management comments: 3:18 PM O2 sat is in the 80s on room air, placed on a nonrebreather  3:30 PM During much better on nonrebreather, O2 sat now in the mid 90s  3:59 PM INR is elevated. Per old records, patient had valve replacement. Will not reverse her coagulopathy  4:29 PM spoke with hospitalist, to see patient for admission    Total critical care time spent on initial evaluation, repeat evaluations, looking up old records, managing her hypoxic respiratory failure and speaking with consultants was 40 minutes.        Amount and/or Complexity of Data Reviewed  Clinical lab tests: ordered and reviewed  Tests in the radiology section of CPT®: ordered and reviewed  Tests in the medicine section of CPT®: ordered and reviewed  Decide to obtain previous medical records or to obtain history from someone other than the patient: yes  Review and summarize past medical records: yes  Discuss the patient with other providers: yes    Risk of Complications, Morbidity, and/or Mortality  Presenting problems: high  Diagnostic procedures: high  Management options: high    Critical Care  Total time providing critical care: 30-74 minutes    Patient Progress  Patient progress: improved         Procedures

## 2019-06-17 ENCOUNTER — APPOINTMENT (OUTPATIENT)
Dept: GENERAL RADIOLOGY | Age: 74
DRG: 291 | End: 2019-06-17
Attending: HOSPITALIST
Payer: MEDICARE

## 2019-06-17 ENCOUNTER — HOSPITAL ENCOUNTER (INPATIENT)
Age: 74
LOS: 4 days | Discharge: HOSPICE | DRG: 291 | End: 2019-06-21
Attending: INTERNAL MEDICINE | Admitting: INTERNAL MEDICINE
Payer: MEDICARE

## 2019-06-17 VITALS
SYSTOLIC BLOOD PRESSURE: 110 MMHG | DIASTOLIC BLOOD PRESSURE: 81 MMHG | HEART RATE: 103 BPM | HEIGHT: 65 IN | BODY MASS INDEX: 24.99 KG/M2 | RESPIRATION RATE: 17 BRPM | OXYGEN SATURATION: 96 % | WEIGHT: 150 LBS | TEMPERATURE: 100 F

## 2019-06-17 DIAGNOSIS — D84.9 IMMUNOSUPPRESSED STATUS (HCC): ICD-10-CM

## 2019-06-17 DIAGNOSIS — Z51.5 ENCOUNTER FOR PALLIATIVE CARE: ICD-10-CM

## 2019-06-17 DIAGNOSIS — J96.01 ACUTE RESPIRATORY FAILURE WITH HYPOXIA (HCC): ICD-10-CM

## 2019-06-17 DIAGNOSIS — Z79.899 IMMUNOCOMPROMISED STATE DUE TO DRUG THERAPY (HCC): ICD-10-CM

## 2019-06-17 DIAGNOSIS — Z71.89 ACP (ADVANCE CARE PLANNING): ICD-10-CM

## 2019-06-17 DIAGNOSIS — R77.8 ELEVATED TROPONIN: ICD-10-CM

## 2019-06-17 DIAGNOSIS — R41.82 ALTERED MENTAL STATUS, UNSPECIFIED ALTERED MENTAL STATUS TYPE: ICD-10-CM

## 2019-06-17 DIAGNOSIS — I50.33 DIASTOLIC CHF, ACUTE ON CHRONIC (HCC): ICD-10-CM

## 2019-06-17 DIAGNOSIS — I50.21 ACUTE SYSTOLIC CONGESTIVE HEART FAILURE (HCC): ICD-10-CM

## 2019-06-17 DIAGNOSIS — R53.81 DEBILITY: ICD-10-CM

## 2019-06-17 DIAGNOSIS — Z95.2 S/P AVR (AORTIC VALVE REPLACEMENT): Chronic | ICD-10-CM

## 2019-06-17 DIAGNOSIS — D84.821 IMMUNOCOMPROMISED STATE DUE TO DRUG THERAPY (HCC): ICD-10-CM

## 2019-06-17 DIAGNOSIS — R10.9 ABDOMINAL PAIN, UNSPECIFIED ABDOMINAL LOCATION: ICD-10-CM

## 2019-06-17 PROBLEM — I25.3 INTERATRIAL SEPTAL ANEURYSM WITH PFO: Status: ACTIVE | Noted: 2019-06-17

## 2019-06-17 PROBLEM — Q21.12 INTERATRIAL SEPTAL ANEURYSM WITH PFO: Status: ACTIVE | Noted: 2019-06-17

## 2019-06-17 LAB
AMMONIA PLAS-SCNC: 21 UMOL/L (ref 24.9–68)
ANION GAP SERPL CALC-SCNC: 10 MMOL/L (ref 7–16)
ARTERIAL PATENCY WRIST A: YES
ATRIAL RATE: 81 BPM
BASE DEFICIT BLD-SCNC: 2 MMOL/L
BASOPHILS # BLD: 0 K/UL (ref 0–0.2)
BASOPHILS NFR BLD: 0 % (ref 0–2)
BDY SITE: ABNORMAL
BNP SERPL-MCNC: 818 PG/ML
BODY TEMPERATURE: 98.6
BUN SERPL-MCNC: 16 MG/DL (ref 8–23)
CALCIUM SERPL-MCNC: 8.2 MG/DL (ref 8.3–10.4)
CALCULATED P AXIS, ECG09: 47 DEGREES
CALCULATED R AXIS, ECG10: 4 DEGREES
CALCULATED T AXIS, ECG11: 85 DEGREES
CHLORIDE SERPL-SCNC: 102 MMOL/L (ref 98–107)
CO2 BLD-SCNC: 23 MMOL/L
CO2 SERPL-SCNC: 24 MMOL/L (ref 21–32)
COLLECT TIME,HTIME: 130
CREAT SERPL-MCNC: 0.65 MG/DL (ref 0.6–1)
DIAGNOSIS, 93000: NORMAL
DIFFERENTIAL METHOD BLD: ABNORMAL
EOSINOPHIL # BLD: 0 K/UL (ref 0–0.8)
EOSINOPHIL NFR BLD: 0 % (ref 0.5–7.8)
ERYTHROCYTE [DISTWIDTH] IN BLOOD BY AUTOMATED COUNT: 16.3 % (ref 11.9–14.6)
GAS FLOW.O2 O2 DELIVERY SYS: ABNORMAL L/MIN
GLUCOSE SERPL-MCNC: 89 MG/DL (ref 65–100)
HCO3 BLD-SCNC: 21.8 MMOL/L (ref 22–26)
HCT VFR BLD AUTO: 36.7 % (ref 35.8–46.3)
HGB BLD-MCNC: 11.6 G/DL (ref 11.7–15.4)
IMM GRANULOCYTES # BLD AUTO: 0.1 K/UL (ref 0–0.5)
IMM GRANULOCYTES NFR BLD AUTO: 1 % (ref 0–5)
INR PPP: >6.8
LYMPHOCYTES # BLD: 0.5 K/UL (ref 0.5–4.6)
LYMPHOCYTES NFR BLD: 7 % (ref 13–44)
MCH RBC QN AUTO: 30.1 PG (ref 26.1–32.9)
MCHC RBC AUTO-ENTMCNC: 31.6 G/DL (ref 31.4–35)
MCV RBC AUTO: 95.1 FL (ref 79.6–97.8)
MONOCYTES # BLD: 0.3 K/UL (ref 0.1–1.3)
MONOCYTES NFR BLD: 4 % (ref 4–12)
NEUTS SEG # BLD: 6.7 K/UL (ref 1.7–8.2)
NEUTS SEG NFR BLD: 87 % (ref 43–78)
NRBC # BLD: 0 K/UL (ref 0–0.2)
O2/TOTAL GAS SETTING VFR VENT: 65 %
P-R INTERVAL, ECG05: 142 MS
PCO2 BLD: 32.1 MMHG (ref 35–45)
PH BLD: 7.44 [PH] (ref 7.35–7.45)
PLATELET # BLD AUTO: 134 K/UL (ref 150–450)
PMV BLD AUTO: 8.8 FL (ref 9.4–12.3)
PO2 BLD: 77 MMHG (ref 75–100)
POTASSIUM SERPL-SCNC: 3 MMOL/L (ref 3.5–5.1)
PROTHROMBIN TIME: 84.3 SEC (ref 11.7–14.5)
Q-T INTERVAL, ECG07: 404 MS
QRS DURATION, ECG06: 126 MS
QTC CALCULATION (BEZET), ECG08: 469 MS
RBC # BLD AUTO: 3.86 M/UL (ref 4.05–5.2)
SAO2 % BLD: 96 % (ref 95–98)
SERVICE CMNT-IMP: ABNORMAL
SODIUM SERPL-SCNC: 136 MMOL/L (ref 136–145)
SPECIMEN TYPE: ABNORMAL
TOBRAMYCIN SERPL-MCNC: 1.5 UG/ML
TROPONIN I SERPL-MCNC: 14.5 NG/ML (ref 0.02–0.05)
TROPONIN I SERPL-MCNC: 16.2 NG/ML (ref 0.02–0.05)
VENTRICULAR RATE, ECG03: 81 BPM
WBC # BLD AUTO: 7.7 K/UL (ref 4.3–11.1)

## 2019-06-17 PROCEDURE — 86580 TB INTRADERMAL TEST: CPT | Performed by: INTERNAL MEDICINE

## 2019-06-17 PROCEDURE — 85610 PROTHROMBIN TIME: CPT

## 2019-06-17 PROCEDURE — 84484 ASSAY OF TROPONIN QUANT: CPT

## 2019-06-17 PROCEDURE — 94760 N-INVAS EAR/PLS OXIMETRY 1: CPT

## 2019-06-17 PROCEDURE — 82140 ASSAY OF AMMONIA: CPT

## 2019-06-17 PROCEDURE — 74011000302 HC RX REV CODE- 302: Performed by: INTERNAL MEDICINE

## 2019-06-17 PROCEDURE — 82803 BLOOD GASES ANY COMBINATION: CPT

## 2019-06-17 PROCEDURE — 36600 WITHDRAWAL OF ARTERIAL BLOOD: CPT

## 2019-06-17 PROCEDURE — 74011000250 HC RX REV CODE- 250: Performed by: INTERNAL MEDICINE

## 2019-06-17 PROCEDURE — 71045 X-RAY EXAM CHEST 1 VIEW: CPT

## 2019-06-17 PROCEDURE — 99222 1ST HOSP IP/OBS MODERATE 55: CPT | Performed by: PHYSICIAN ASSISTANT

## 2019-06-17 PROCEDURE — 85025 COMPLETE CBC W/AUTO DIFF WBC: CPT

## 2019-06-17 PROCEDURE — 74011250636 HC RX REV CODE- 250/636: Performed by: HOSPITALIST

## 2019-06-17 PROCEDURE — 83880 ASSAY OF NATRIURETIC PEPTIDE: CPT

## 2019-06-17 PROCEDURE — 36415 COLL VENOUS BLD VENIPUNCTURE: CPT

## 2019-06-17 PROCEDURE — 74011000258 HC RX REV CODE- 258: Performed by: INTERNAL MEDICINE

## 2019-06-17 PROCEDURE — 80200 ASSAY OF TOBRAMYCIN: CPT

## 2019-06-17 PROCEDURE — 74011250636 HC RX REV CODE- 250/636: Performed by: INTERNAL MEDICINE

## 2019-06-17 PROCEDURE — 80048 BASIC METABOLIC PNL TOTAL CA: CPT

## 2019-06-17 PROCEDURE — 74011250637 HC RX REV CODE- 250/637: Performed by: INTERNAL MEDICINE

## 2019-06-17 PROCEDURE — 74011636637 HC RX REV CODE- 636/637: Performed by: INTERNAL MEDICINE

## 2019-06-17 PROCEDURE — 77030034849

## 2019-06-17 PROCEDURE — 65660000000 HC RM CCU STEPDOWN

## 2019-06-17 PROCEDURE — 77010033711 HC HIGH FLOW OXYGEN

## 2019-06-17 PROCEDURE — 92610 EVALUATE SWALLOWING FUNCTION: CPT

## 2019-06-17 PROCEDURE — C8929 TTE W OR WO FOL WCON,DOPPLER: HCPCS

## 2019-06-17 RX ORDER — NITROGLYCERIN 0.4 MG/1
0.4 TABLET SUBLINGUAL
Status: DISCONTINUED | OUTPATIENT
Start: 2019-06-17 | End: 2019-06-21 | Stop reason: HOSPADM

## 2019-06-17 RX ORDER — ACETAMINOPHEN 500 MG
1000 TABLET ORAL EVERY 8 HOURS
Status: DISCONTINUED | OUTPATIENT
Start: 2019-06-17 | End: 2019-06-19

## 2019-06-17 RX ORDER — SULFASALAZINE 500 MG/1
1000 TABLET ORAL
Status: CANCELLED | OUTPATIENT
Start: 2019-06-17

## 2019-06-17 RX ORDER — NITROGLYCERIN 0.4 MG/1
0.4 TABLET SUBLINGUAL
Status: CANCELLED | OUTPATIENT
Start: 2019-06-17

## 2019-06-17 RX ORDER — FAMOTIDINE 20 MG/1
20 TABLET, FILM COATED ORAL EVERY EVENING
Status: CANCELLED | OUTPATIENT
Start: 2019-06-17

## 2019-06-17 RX ORDER — LEVETIRACETAM 500 MG/1
500 TABLET ORAL 2 TIMES DAILY
Status: DISCONTINUED | OUTPATIENT
Start: 2019-06-17 | End: 2019-06-21 | Stop reason: HOSPADM

## 2019-06-17 RX ORDER — HYDROCODONE BITARTRATE AND ACETAMINOPHEN 5; 325 MG/1; MG/1
1 TABLET ORAL
Status: DISCONTINUED | OUTPATIENT
Start: 2019-06-17 | End: 2019-06-21 | Stop reason: HOSPADM

## 2019-06-17 RX ORDER — ALBUTEROL SULFATE 0.83 MG/ML
2.5 SOLUTION RESPIRATORY (INHALATION)
Status: DISCONTINUED | OUTPATIENT
Start: 2019-06-17 | End: 2019-06-21 | Stop reason: HOSPADM

## 2019-06-17 RX ORDER — SODIUM CHLORIDE 0.9 % (FLUSH) 0.9 %
5-40 SYRINGE (ML) INJECTION AS NEEDED
Status: DISCONTINUED | OUTPATIENT
Start: 2019-06-17 | End: 2019-06-21 | Stop reason: HOSPADM

## 2019-06-17 RX ORDER — FUROSEMIDE 10 MG/ML
20 INJECTION INTRAMUSCULAR; INTRAVENOUS
Status: COMPLETED | OUTPATIENT
Start: 2019-06-17 | End: 2019-06-17

## 2019-06-17 RX ORDER — HYDROCODONE BITARTRATE AND ACETAMINOPHEN 5; 325 MG/1; MG/1
1 TABLET ORAL
Status: CANCELLED | OUTPATIENT
Start: 2019-06-17

## 2019-06-17 RX ORDER — SODIUM CHLORIDE 0.9 % (FLUSH) 0.9 %
5-40 SYRINGE (ML) INJECTION AS NEEDED
Status: CANCELLED | OUTPATIENT
Start: 2019-06-17

## 2019-06-17 RX ORDER — POLYVINYL ALCOHOL 14 MG/ML
1 SOLUTION/ DROPS OPHTHALMIC AS NEEDED
Status: DISCONTINUED | OUTPATIENT
Start: 2019-06-17 | End: 2019-06-21 | Stop reason: HOSPADM

## 2019-06-17 RX ORDER — PANTOPRAZOLE SODIUM 40 MG/1
40 TABLET, DELAYED RELEASE ORAL
Status: DISCONTINUED | OUTPATIENT
Start: 2019-06-18 | End: 2019-06-21 | Stop reason: HOSPADM

## 2019-06-17 RX ORDER — FAMOTIDINE 20 MG/1
20 TABLET, FILM COATED ORAL EVERY EVENING
Status: DISCONTINUED | OUTPATIENT
Start: 2019-06-17 | End: 2019-06-21 | Stop reason: HOSPADM

## 2019-06-17 RX ORDER — PREDNISONE 5 MG/1
5 TABLET ORAL
Status: DISCONTINUED | OUTPATIENT
Start: 2019-06-18 | End: 2019-06-19

## 2019-06-17 RX ORDER — DICYCLOMINE HYDROCHLORIDE 10 MG/1
10 CAPSULE ORAL
Status: DISCONTINUED | OUTPATIENT
Start: 2019-06-17 | End: 2019-06-21 | Stop reason: HOSPADM

## 2019-06-17 RX ORDER — FOLIC ACID 1 MG/1
1 TABLET ORAL DAILY
Status: CANCELLED | OUTPATIENT
Start: 2019-06-18

## 2019-06-17 RX ORDER — LEVETIRACETAM 500 MG/1
500 TABLET ORAL 2 TIMES DAILY
Status: CANCELLED | OUTPATIENT
Start: 2019-06-17

## 2019-06-17 RX ORDER — LEVOTHYROXINE SODIUM 125 UG/1
125 TABLET ORAL
Status: DISCONTINUED | OUTPATIENT
Start: 2019-06-18 | End: 2019-06-21 | Stop reason: HOSPADM

## 2019-06-17 RX ORDER — PREDNISONE 10 MG/1
5 TABLET ORAL
Status: CANCELLED | OUTPATIENT
Start: 2019-06-18

## 2019-06-17 RX ORDER — ACETAMINOPHEN 500 MG
1000 TABLET ORAL EVERY 8 HOURS
Status: CANCELLED | OUTPATIENT
Start: 2019-06-17

## 2019-06-17 RX ORDER — SULFASALAZINE 500 MG/1
1000 TABLET ORAL
Status: DISCONTINUED | OUTPATIENT
Start: 2019-06-17 | End: 2019-06-21 | Stop reason: HOSPADM

## 2019-06-17 RX ORDER — PANTOPRAZOLE SODIUM 40 MG/1
40 TABLET, DELAYED RELEASE ORAL
Status: CANCELLED | OUTPATIENT
Start: 2019-06-18

## 2019-06-17 RX ORDER — SODIUM CHLORIDE 0.9 % (FLUSH) 0.9 %
5-40 SYRINGE (ML) INJECTION EVERY 8 HOURS
Status: CANCELLED | OUTPATIENT
Start: 2019-06-17

## 2019-06-17 RX ORDER — DICYCLOMINE HYDROCHLORIDE 10 MG/1
10 CAPSULE ORAL
Status: CANCELLED | OUTPATIENT
Start: 2019-06-17

## 2019-06-17 RX ORDER — ALBUTEROL SULFATE 0.83 MG/ML
2.5 SOLUTION RESPIRATORY (INHALATION)
Status: CANCELLED | OUTPATIENT
Start: 2019-06-17

## 2019-06-17 RX ORDER — FUROSEMIDE 10 MG/ML
INJECTION INTRAMUSCULAR; INTRAVENOUS
Status: DISCONTINUED
Start: 2019-06-17 | End: 2019-06-17 | Stop reason: HOSPADM

## 2019-06-17 RX ORDER — POTASSIUM CHLORIDE 14.9 MG/ML
20 INJECTION INTRAVENOUS
Status: COMPLETED | OUTPATIENT
Start: 2019-06-17 | End: 2019-06-17

## 2019-06-17 RX ORDER — LEVOTHYROXINE SODIUM 125 UG/1
125 TABLET ORAL
Status: CANCELLED | OUTPATIENT
Start: 2019-06-18

## 2019-06-17 RX ORDER — SODIUM CHLORIDE 0.9 % (FLUSH) 0.9 %
5-40 SYRINGE (ML) INJECTION EVERY 8 HOURS
Status: DISCONTINUED | OUTPATIENT
Start: 2019-06-17 | End: 2019-06-21 | Stop reason: HOSPADM

## 2019-06-17 RX ORDER — HYDROXYCHLOROQUINE SULFATE 200 MG/1
300 TABLET, FILM COATED ORAL DAILY
Status: DISCONTINUED | OUTPATIENT
Start: 2019-06-18 | End: 2019-06-21 | Stop reason: HOSPADM

## 2019-06-17 RX ORDER — HYDROXYCHLOROQUINE SULFATE 200 MG/1
300 TABLET, FILM COATED ORAL DAILY
Status: CANCELLED | OUTPATIENT
Start: 2019-06-18

## 2019-06-17 RX ORDER — POLYVINYL ALCOHOL 14 MG/ML
1 SOLUTION/ DROPS OPHTHALMIC AS NEEDED
Status: CANCELLED | OUTPATIENT
Start: 2019-06-17

## 2019-06-17 RX ORDER — FOLIC ACID 1 MG/1
1 TABLET ORAL DAILY
Status: DISCONTINUED | OUTPATIENT
Start: 2019-06-18 | End: 2019-06-21 | Stop reason: HOSPADM

## 2019-06-17 RX ADMIN — HYDROXYCHLOROQUINE SULFATE 300 MG: 200 TABLET, FILM COATED ENTERAL at 08:32

## 2019-06-17 RX ADMIN — ACETAMINOPHEN 1000 MG: 500 TABLET, FILM COATED ORAL at 21:39

## 2019-06-17 RX ADMIN — PIPERACILLIN SODIUM,TAZOBACTAM SODIUM 4.5 G: 4; .5 INJECTION, POWDER, FOR SOLUTION INTRAVENOUS at 00:05

## 2019-06-17 RX ADMIN — FUROSEMIDE 20 MG: 10 INJECTION, SOLUTION INTRAMUSCULAR; INTRAVENOUS at 02:00

## 2019-06-17 RX ADMIN — FOLIC ACID 1 MG: 1 TABLET ORAL at 08:20

## 2019-06-17 RX ADMIN — FAMOTIDINE 20 MG: 20 TABLET ORAL at 17:13

## 2019-06-17 RX ADMIN — ACETAMINOPHEN 1000 MG: 500 TABLET, FILM COATED ORAL at 13:43

## 2019-06-17 RX ADMIN — SULFASALAZINE 1000 MG: 500 TABLET ORAL at 08:31

## 2019-06-17 RX ADMIN — TUBERCULIN PURIFIED PROTEIN DERIVATIVE 5 UNITS: 5 INJECTION, SOLUTION INTRADERMAL at 05:10

## 2019-06-17 RX ADMIN — PERFLUTREN 1 ML: 6.52 INJECTION, SUSPENSION INTRAVENOUS at 09:00

## 2019-06-17 RX ADMIN — HYDROCODONE BITARTRATE AND ACETAMINOPHEN 1 TABLET: 5; 325 TABLET ORAL at 18:49

## 2019-06-17 RX ADMIN — LEVETIRACETAM 500 MG: 500 TABLET ORAL at 17:13

## 2019-06-17 RX ADMIN — PANTOPRAZOLE SODIUM 40 MG: 40 TABLET, DELAYED RELEASE ORAL at 08:20

## 2019-06-17 RX ADMIN — POTASSIUM CHLORIDE 20 MEQ: 200 INJECTION, SOLUTION INTRAVENOUS at 08:15

## 2019-06-17 RX ADMIN — PREDNISONE 5 MG: 10 TABLET ORAL at 08:21

## 2019-06-17 RX ADMIN — LEVETIRACETAM 500 MG: 500 TABLET, FILM COATED ORAL at 08:32

## 2019-06-17 RX ADMIN — POTASSIUM CHLORIDE 20 MEQ: 200 INJECTION, SOLUTION INTRAVENOUS at 11:40

## 2019-06-17 RX ADMIN — FUROSEMIDE 10 MG/HR: 10 INJECTION, SOLUTION INTRAMUSCULAR; INTRAVENOUS at 12:14

## 2019-06-17 RX ADMIN — VENLAFAXINE HYDROCHLORIDE 225 MG: 150 CAPSULE, EXTENDED RELEASE ORAL at 08:31

## 2019-06-17 RX ADMIN — Medication 5 ML: at 13:45

## 2019-06-17 RX ADMIN — LEVOTHYROXINE SODIUM 125 MCG: 125 TABLET ORAL at 08:20

## 2019-06-17 RX ADMIN — SULFASALAZINE 1000 MG: 500 TABLET ORAL at 17:13

## 2019-06-17 NOTE — PROGRESS NOTES
Problem: Falls - Risk of  Goal: *Absence of Falls  Description  Document Davonte Hernandez Fall Risk and appropriate interventions in the flowsheet. Outcome: Progressing Towards Goal     Problem: Pressure Injury - Risk of  Goal: *Prevention of pressure injury  Description  Document Shadi Scale and appropriate interventions in the flowsheet.   Outcome: Progressing Towards Goal     Problem: Pneumonia: Day 2  Goal: Activity/Safety  Outcome: Progressing Towards Goal  Goal: Consults, if ordered  Outcome: Progressing Towards Goal  Goal: Diagnostic Test/Procedures  Outcome: Progressing Towards Goal  Goal: Nutrition/Diet  Outcome: Progressing Towards Goal  Goal: Discharge Planning  Outcome: Progressing Towards Goal  Goal: Medications  Outcome: Progressing Towards Goal  Goal: Respiratory  Outcome: Progressing Towards Goal  Goal: Treatments/Interventions/Procedures  Outcome: Progressing Towards Goal  Goal: Psychosocial  Outcome: Progressing Towards Goal  Goal: *Oxygen saturation within defined limits  Outcome: Progressing Towards Goal  Goal: *Hemodynamically stable  Outcome: Progressing Towards Goal  Goal: *Demonstrates progressive activity  Outcome: Progressing Towards Goal  Goal: *Tolerating diet  Outcome: Progressing Towards Goal  Goal: *Optimal pain control at patient's stated goal  Outcome: Progressing Towards Goal

## 2019-06-17 NOTE — PROGRESS NOTES
Shift assessment complete. Pt resting in bed. Alert to person, disoriented to time, situation, place. Lung sounds diminished on auscultation. Pt on Airveo. Odom in place, draining yellow, clear urine. Pt denies pain at this time. IVF infusing without difficulty. Bed in lowest position, call light within reach, side rails x3, bed alarm in place.

## 2019-06-17 NOTE — PROGRESS NOTES
OT/PT note: orders received chart reviewed, Pts BP 92/56 sleeping on Airvo. Spoke with Nursing  Will re attempt eval at another time.  Delonte Wilson OTR/L

## 2019-06-17 NOTE — PROGRESS NOTES
SPEECH PATHOLOGY NOTE:      New speech therapy orders received. Patient seen this date at [facility] prior to transfer to [facility]. Recommend puree diet/thin liquids via single cup sip, no straws per initial evaluation. Will continue to follow up for dysphagia treatment per plan of care.        Montrell Chung, INST MEDICO DEL [facility], [facility] EVERARDO ARANGO, CF-SLP

## 2019-06-17 NOTE — PROGRESS NOTES
Warfarin dosing per pharmacist    Bel Vigil is a 68 y.o. female. Indication:  AVR    Goal INR:  2-3 per anticoag notes    Home dose:  2 mg M, F; 4 mg all other days    Risk factors or significant drug interactions:  none    Other anticoagulants:  none    Daily Monitoring  Date  INR     Warfarin dose HGB              Notes  6/16  >6.8  Hold  11.9  6/17  >6.8  Hold  11.6      Pharmacy consulted to manage warfarin for Ms. Kalina Proctor during this admission. She presents with supratherapeutic INR and warfarin has been held. Continue to hold warfarin until INR trends down toward therapeutic range. Daily INR. Pharmacy will continue to follow. Please call with any questions.     Thank you,  Marcellus Peguero, PharmD  Clinical Pharmacist  [phone]

## 2019-06-17 NOTE — PROGRESS NOTES
No BM. Arouses easily. Remains confused. Resp rate improved at 28 bpm with O2 intact, O2 sat 97%, pulse 86, BP 94/61. Catheter emptied of 550 ml clear, yellow urine. Head of bed elevated. No distress noted. Bed alarm set.

## 2019-06-17 NOTE — PROGRESS NOTES
Incontinent of urine, brief saturated, care given, repositioned. Head of bed elevated, O2 intact. Pt informed that a urinary catheter will be placed.

## 2019-06-17 NOTE — PROGRESS NOTES
Resting quietly, arousing briefly during bedside report given to Mindy Rey RN. O2 intact, head of bed elevated. No BM after second attempt on bedpan. Calll light in reach, bed alarm set, door open. No distress noted.

## 2019-06-17 NOTE — PROGRESS NOTES
Spiritual Care Visit, initial visit. Attempted to visit with:  Ultrasound in progress. .    Will try again later. Visit by Sherrilyn Alexandria Tobin Osgood, Staff .  Noé., Th.B., B.A.

## 2019-06-17 NOTE — H&P
Patient being transferred to [facility]. See discharge summary from today.     Macey Tolbert MD

## 2019-06-17 NOTE — PROGRESS NOTES
Pt is not unwilling to go downtown [facility]. There is no legal next of kin to get to sign EMTALA form.

## 2019-06-17 NOTE — CONSULTS
CONSULT NOTE    Claudean Prose    6/17/2019    Date of Admission:  6/16/2019    The patient's chart is reviewed and the patient is discussed with the staff. Subjective:     Patient is a 68 y.o.  female seen and evaluated at the request of Dr. Shawn Leone. Pt has a history of mixed connective tissue disease on Reclast, sulfasalazine, methotrexate, plaquenil, and prednisone 5mg daily, mechanical aortic valve on chronic coumadin, dementia, and chronic pain. She resides at [facility] and was recently treated for pneumonia. She presented to the ER at [facility] on 6/16/19 with acute respiratory failure with hypoxemia. She was admitted and we were consulted to assist. She was initially treated for pneumonia but her BNP was 818 this AM and abx discontinued and she was given IV lasix early this AM.   Pt lying in bed on 40L at 65%. Pt only awakes briefly and moans. She was unable to answer any questions. Per report, at [facility], she is alert and able to converse. PCO2 this AM was 32. She is unable to assist with any history. Review of Systems  Review of systems not obtained due to patient factors.     Patient Active Problem List   Diagnosis Code    Chronic pain G89.29    S/P AVR (aortic valve replacement) Z95.2    Fracture of femoral neck, right ([address] S72.001A    HTN (hypertension)--Dr. Crowder Aw I10    GERD (gastroesophageal reflux disease) K21.9    PUD (peptic ulcer disease) K27.9    S/P total hip arthroplasty Z96.649    Prosthetic hip implant failure ([address] T84.018A, Z96.649    Immunosuppressed status ([address] D89.9    Dermatomyositis ([address] M33.90    Embolism and thrombosis of unspecified artery ([address] I74.9    Primary localized osteoarthrosis, pelvic region and thigh M16.10    Long term current use of anticoagulant therapy Z79.01    Fibromyalgia M79.7    Acquired absence of breast and nipple Z90.10    Pain in joint involving pelvic region and thigh M25.559    Postmenopausal atrophic vaginitis N95.2    Osteoporosis M81.0    Chronic anxiety F41.9    Dysphagia R13.10    Expressive aphasia R47.01    Acquired hypothyroidism E03.9    Muscle spasticity M62.838    Facial hematoma S00.83XA    Neck pain M54.2    Right ankle pain M25.571    Seizure disorder (HCC) G40.909    Fungal dermatitis B36.9    Moderate episode of recurrent major depressive disorder ([facility]) F33.1    Seasonal allergic rhinitis due to pollen J30.1    History of fracture of hip Z87.81    Encounter for medication management Z79.899    Postural imbalance R29.3    Polyneuropathy G62.9    Shortness of breath R06.02    Sleep disturbance G47.9    Dyspnea on exertion R06.09    Recurrent major depressive disorder, in partial remission ([facility]) F33.41    Hypokalemia E87.6    Hypomagnesemia E83.42    UTI (urinary tract infection) N39.0    Familial tremor G25.0    Neuropathic pain M79.2    Long term (current) use of anticoagulants M22.56    Diastolic CHF, chronic (HCC) I50.32    Fall W19. Piter Roll Pre-op examination Z01.818    Pneumonia J18.9    Acute respiratory failure with hypoxia ([address] J96.01    Immunocompromised state due to drug therapy Z79.899    Mixed connective tissue disease ([address] M35.1    Medication induced coagulopathy ([address] D68.9, T50.905A    Polypharmacy Z79.899       [employer] company none. Prior to Admission Medications   Prescriptions Last Dose Informant Patient Reported? Taking?   acetaminophen (TYLENOL EXTRA STRENGTH) 500 mg tablet   Yes Yes   Sig: Take 1,000 mg by mouth every eight (8) hours. amitriptyline (ELAVIL) 10 mg tablet   Yes Yes   Sig: Take  by mouth nightly. baclofen (LIORESAL) 20 mg tablet   No Yes   Sig: Take 1 Tab by mouth four (4) times daily for 30 days. Patient taking differently: Take 20 mg by mouth three (3) times daily. carvedilol (COREG) 12.5 mg tablet   Yes Yes   Sig: Take  by mouth two (2) times daily (with meals). cholecalciferol, vitamin D3, (VITAMIN D3) 2,000 unit tab   Yes Yes   Sig: Take  by mouth daily. cyanocobalamin 1,000 mcg tablet   Yes Yes   Sig: Take 1,000 mcg by mouth daily. dicyclomine (BENTYL) 10 mg capsule   No Yes   Sig: Take 1 Cap by mouth daily as needed. diphenoxylate-atropine (LOMOTIL) 2.5-0.025 mg per tablet   Yes Yes   Sig: Take  by mouth four (4) times daily as needed for Diarrhea. fluticasone (FLONASE ALLERGY RELIEF) 50 mcg/actuation nasal spray   Yes Yes   Si Sprays by Both Nostrils route daily as needed for Rhinitis. folic acid (FOLVITE) 1 mg tablet   No Yes   Sig: Take 1 Tab by mouth daily. hydroCHLOROthiazide (HYDRODIURIL) 25 mg tablet   No Yes   Sig: Take 1 Tab by mouth daily. hydrocodone/acetaminophen (NORCO PO)   Yes Yes   Sig: Take 5 mg by mouth three (3) times daily as needed for Pain.   hydroxychloroquine (PLAQUENIL) 200 mg tablet   No Yes   Sig: Take 1.5 Tabs by mouth daily. levETIRAcetam (KEPPRA) 500 mg tablet   Yes Yes   Sig: Take 500 mg by mouth two (2) times a day. levothyroxine (SYNTHROID) 125 mcg tablet   Yes Yes   Sig: Take  by mouth Daily (before breakfast). methotrexate (RHEUMATREX) 2.5 mg tablet   No Yes   Sig: Take 8 tablets once a week. Must have labs done before refills can be given. nitroglycerin (NITROSTAT) 0.4 mg SL tablet   No Yes   Si Tab by SubLINGual route every five (5) minutes as needed for Chest Pain. ondansetron hcl (ZOFRAN, AS HYDROCHLORIDE,) 4 mg tablet   No Yes   Sig: Take 1 Tab by mouth every eight (8) hours as needed for Nausea. pantoprazole (PROTONIX) 40 mg tablet   No Yes   Sig: Take 1 Tab by mouth daily. polyvinyl alcohol (LIQUIFILM TEARS) 1.4 % ophthalmic solution   Yes Yes   Sig: Administer 1 Drop to both eyes as needed. Indications: dry eye   predniSONE (DELTASONE) 5 mg tablet   Yes Yes   Sig: Take 5 mg by mouth daily. raNITIdine (ZANTAC) 150 mg tablet   Yes Yes   Sig: Take 150 mg by mouth nightly.    spironolactone (ALDACTONE) 25 mg tablet   No Yes   Sig: Take 1 Tab by mouth two (2) times a day. sulfaSALAzine (AZULFIDINE) 500 mg tablet   Yes Yes   Sig: Take 1,000 mg by mouth three (3) times daily as needed. traZODone (DESYREL) 100 mg tablet   No Yes   Sig: Take 2 hs prn sleep   venlafaxine (EFFEXOR) 75 mg tablet   Yes Yes   Sig: Take 225 mg by mouth daily. warfarin (COUMADIN) 2 mg tablet   No Yes   Si mg Tues, Thurs, Sat and 4 mg all other days. Starting    Patient taking differently: 2 mg on  and ; 4 mg all other days  Indications: Blood Clot caused by Artificial Heart Valve   zoledronic acid/mannitol-water (RECLAST IV)   Yes No   Sig: by IntraVENous route Once a year.  Last infusion was 10/16/18      Facility-Administered Medications: None       Past Medical History:   Diagnosis Date    A-fib [facility])     Acquired hypothyroidism     Acute CVA (cerebrovascular accident) ([facility] 2014    Acute lacunar infarction ([facility]     Anxiety state, unspecified     Aortic valve stenosis, congenital 2016    CAD (coronary artery disease)     Cancer (HCC)     pre cancerous cells in breast     Candida Esophagitis 2010    Chronic kidney disease     Chronic pain     Depression     Duodenal ulcer     Dysphagia 2016    Embolism and thrombosis of unspecified artery ([address] 2014    Fibromyalgia     History of anemia 2012    GI blood loss    History of drug overdose 2016    narcotics    History of encephalopathy 2010    History of fracture of hip 03/10/2012    femoral neck    History of mastectomy     bilat    Hypertension     controlled with medication    Immunosuppressed status ([address] 2014    Long term (current) use of anticoagulants     MCTD (mixed connective tissue disease) ([facility]     seeing Dr. Mike Obregon, on methotrexate and Plaquenil    Nausea & vomiting     Osteoarthritis     Other ill-defined cerebrovascular disease     Pain in joint, pelvic region and thigh     Personal history of fall     Postmenopausal atrophic vaginitis     Primary localized osteoarthrosis, pelvic region and thigh     PUD (peptic ulcer disease) 2009    PVD (peripheral vascular disease) ([facility]     Reflux esophagitis     S/P AVR (aortic valve replacement)     [facility]    Seasonal allergic rhinitis due to pollen     Seizures ([facility]     2 years ago     Sleep disturbance     Symptomatic menopausal or female climacteric states     Thromboembolus [facility]) 2007    right arm , right leg    Unspecified disorder of liver     Urticaria     Mild on back     Past Surgical History:   Procedure Laterality Date    ABDOMEN SURGERY [address]    exploratory tubes & ovaries removed    EGD  3/29/2019         HX ACL RECONSTRUCTION Left 1979    HX AORTIC VALVE REPLACEMENT  2004    [facility] [place] Aortic valve Valve    HX AORTIC VALVE REPLACEMENT      HX CARPAL TUNNEL RELEASE Right 1995    HX CATARACT REMOVAL Right 3/20/2014    [facility]    HX CHOLECYSTECTOMY      HX COLONOSCOPY  9/25/13    Internal Hemorrhoids. Bx reveals Lymphocytic colitis. No further colonscopies will be scheduled due to concominant medical problems.  HX COLONOSCOPY  2010    Internal hemorrhoids. Repeated 2013, no further colonoscopies planned. Nuria.  HX HYSTERECTOMY  1975    HX MASTECTOMY Bilateral 2000    Precancerous. Mat Boyer.  HX OTHER SURGICAL      right brachial  thrombosis    HX TONSIL AND ADENOIDECTOMY      HX TONSILLECTOMY      HX TUMOR REMOVAL  1986    Malignant tumor of colon.     REVISE TOTAL HIP REPLACEMENT Right 1/17/13    TOTAL HIP ARTHROPLASTY Right 12/2012, 3/11/12, 2/2013     Social History     Socioeconomic History    Marital status:      Spouse name: Norm    Number of children: Not on file    Years of education: Not on file    Highest education level: Not on file   Occupational History    Occupation: retired   Social Needs    Financial resource strain: Not on file  Food insecurity:     Worry: Not on file     Inability: Not on file    Transportation needs:     Medical: Not on file     Non-medical: Not on file   Tobacco Use    Smoking status: Former Smoker     Packs/day: 0.25     Years: 10.00     Pack years: 2.50     Last attempt to quit: 3/23/1976     Years since quittin.2    Smokeless tobacco: Never Used    Tobacco comment: in 80s   Substance and Sexual Activity    Alcohol use: No     Alcohol/week: 0.0 oz    Drug use: Yes     Types: Prescription     Comment: Norco    Sexual activity: Never   Lifestyle    Physical activity:     Days per week: Not on file     Minutes per session: Not on file    Stress: Not on file   Relationships    Social connections:     Talks on phone: Not on file     Gets together: Not on file     Attends Restoration service: Not on file     Active member of club or organization: Not on file     Attends meetings of clubs or organizations: Not on file     Relationship status: Not on file    Intimate partner violence:     Fear of current or ex partner: Not on file     Emotionally abused: Not on file     Physically abused: Not on file     Forced sexual activity: Not on file   Other Topics Concern    Not on file   Social History Narrative    Patient lives at [facility]. She denies any in-house stairs. 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 Activity reported as brain games, reading, and talking with friends.  Exercise is limited, uses walker or WC                     Family History   Problem Relation Age of Onset    Stroke Mother     Heart Disease Father     Hypertension Father     Heart Disease Sister     Seizures Brother      Allergies   Allergen Reactions    Latex Anaphylaxis    Bee Sting [Sting, Bee] Shortness of Breath     anaphylatics    Adhesive Rash     Including Coban wrap    Diflucan [Fluconazole] Other (comments)     Increased INR per pt Current Facility-Administered Medications   Medication Dose Route Frequency    potassium chloride 20 mEq in 100 ml IVPB  20 mEq IntraVENous Q2H    acetaminophen (TYLENOL) tablet 1,000 mg  1,000 mg Oral Q8H    carvedilol (COREG) tablet 12.5 mg  12.5 mg Oral BID WITH MEALS    dicyclomine (BENTYL) capsule 10 mg  10 mg Oral DAILY PRN    folic acid (FOLVITE) tablet 1 mg  1 mg Oral DAILY    HYDROcodone-acetaminophen (NORCO) 5-325 mg per tablet 1 Tab  1 Tab Oral TID PRN    hydroxychloroquine (PLAQUENIL) tablet 300 mg  300 mg Oral DAILY    levETIRAcetam (KEPPRA) tablet 500 mg  500 mg Oral BID    levothyroxine (SYNTHROID) tablet 125 mcg  125 mcg Oral ACB    nitroglycerin (NITROSTAT) tablet 0.4 mg  0.4 mg SubLINGual Q5MIN PRN    pantoprazole (PROTONIX) tablet 40 mg  40 mg Oral ACB    polyvinyl alcohol (LIQUIFILM TEARS) 1.4 % ophthalmic solution 1 Drop  1 Drop Both Eyes PRN    predniSONE (DELTASONE) tablet 5 mg  5 mg Oral DAILY WITH BREAKFAST    famotidine (PEPCID) tablet 20 mg  20 mg Oral QPM    sulfaSALAzine (AZULFIDINE) tablet 1,000 mg  1,000 mg Oral TID WITH MEALS    sodium chloride (NS) flush 5-40 mL  5-40 mL IntraVENous Q8H    sodium chloride (NS) flush 5-40 mL  5-40 mL IntraVENous PRN    tuberculin injection 5 Units  5 Units IntraDERMal ONCE    venlafaxine-SR (EFFEXOR-XR) capsule 225 mg  225 mg Oral DAILY WITH BREAKFAST    albuterol (PROVENTIL VENTOLIN) nebulizer solution 2.5 mg  2.5 mg Nebulization Q6H PRN         Objective:     Vitals:    06/17/19 0044 06/17/19 0155 06/17/19 0533 06/17/19 0812   BP: 108/71 98/66 94/61    Pulse: (!) 124 (!) 106 86    Resp: (!) 40  28    Temp: 99.1 °F (37.3 °C)  98.7 °F (37.1 °C)    SpO2: (!) 86%  97% 96%   Weight:       Height:           PHYSICAL EXAM     Constitutional:  the patient is well developed and in no acute distress.  On 40L at 65%  EENMT:  Sclera clear, pupils equal, oral mucosa moist  Respiratory: fairly clear anteriorly   Cardiovascular:  RRR +systolic click  Gastrointestinal: soft and non-tender; with positive bowel sounds. Musculoskeletal: warm without cyanosis. There is no lower extremity edema. Skin: gray in color  Neurologic: asleep    Psychiatric:  Asleep, awakes briefly     CXR:        Recent Labs     06/17/19  0607 06/16/19  1454 06/16/19  1453   WBC 7.7 9.0  --    HGB 11.6* 11.9  --    HCT 36.7 37.5  --    * 158  --    INR  --   --  >6.8*     Recent Labs     06/17/19  0607 06/16/19  1454    134*   K 3.0* 3.5    99   GLU 89 144*   CO2 24 28   BUN 16 20   CREA 0.65 0.78   CA 8.2* 8.9   ALB  --  3.0*   TBILI  --  0.3   ALT  --  27   SGOT  --  33     No results for input(s): PH, PCO2, PO2, HCO3 in the last 72 hours. No results for input(s): LCAD, LAC in the last 72 hours. Assessment:  (Medical Decision Making)     Hospital Problems  Date Reviewed: 6/17/2019          Codes Class Noted POA    Interatrial septal aneurysm with PFO ICD-10-CM: Q21.1  ICD-9-CM: 745.5  6/17/2019 Unknown    Overview Signed 6/17/2019  8:47 AM by Bonney Babinski, PA     10/2016: echo from [address]:   · The left ventricle is small. · There is mild concentric left ventricular hypertrophy present. · The left ventricular systolic function is normal (55-65%). · Normal left ventricular diastolic function. · There is a patent foramen ovale present with right to left shunting   indicated by bubble study. · There is a bileaflet tilting disc mechanical aortic valve present. The   prosthetic valve function is normal.  · Mild tricuspid valve regurgitation. · Saline contrast was given to evaluate for intracardiac shunt.  Right to   left shunt seen at rest.  · There is an interatrial septal aneurysm present         Seen on prior echo at [address], recommend echo with bubble study today     * (Principal) Pneumonia ICD-10-CM: J18.9  ICD-9-CM: 486  6/16/2019 Yes    Unlikely, procalcitonin normal, likely pulmonary edema with acute on chronic diastolic CHF    Acute respiratory failure with hypoxia ([place] [address] ICD-10-CM: J96.01  ICD-9-CM: 518.81  6/16/2019 Yes    Wean FiO2 as tolerated     Immunocompromised state due to drug therapy ICD-10-CM: Z79.899  ICD-9-CM: V58.69  6/16/2019 Yes    Chronic     Mixed connective tissue disease ([place] [address] ICD-10-CM: M35.1  ICD-9-CM: 710.8  6/16/2019 Yes    Chronic     Medication induced coagulopathy ([place] [address] ICD-10-CM: D68.9, T50.905A  ICD-9-CM: 286.9, E947.9  6/16/2019 Yes    Secondary to mechanical valve     Polypharmacy ICD-10-CM: Z79.899  ICD-9-CM: V58.69  6/16/2019 Yes    Chronic     Diastolic CHF, acute on chronic [facility]) ICD-10-CM: I50.33  ICD-9-CM: 428.33, 428.0  5/9/2018 Yes    Given IV lasix this AM, will likely require more later today     Recurrent major depressive disorder, in partial remission ([place] [address] ICD-10-CM: F33.41  ICD-9-CM: 296.35  4/14/2017 Yes        Seizure disorder ([place] [address] ICD-10-CM: G40.909  ICD-9-CM: 345.90  12/13/2016 Yes        Acquired hypothyroidism ICD-10-CM: E03.9  ICD-9-CM: 244.9  9/12/2016 Yes    Continue home dose medication     Long term current use of anticoagulant therapy ICD-10-CM: Z79.01  ICD-9-CM: V58.61  Unknown Yes    For mechanical valve     HTN (hypertension)--Dr. Daphne Rae ICD-10-CM: I10  ICD-9-CM: 401.9  1/17/2013 Yes        S/P total hip arthroplasty ICD-10-CM: J53.034  ICD-9-CM: V43.64  1/17/2013 Yes    Prior history     Chronic pain (Chronic) ICD-10-CM: H15.34  ICD-9-CM: 338.29  2/4/2010 Yes    Chronic     S/P AVR (aortic valve replacement) (Chronic) ICD-10-CM: Z95.2  ICD-9-CM: V43.3  2/4/2010 Yes    With mechanical valve, on coumadin           Plan:  (Medical Decision Making)     --wean FiO2 as tolerated  --continue diuresis  --await echo results.  Recommend echo with bubble study given prior history of PFO and intraseptal aneurysm   --procalcitonin 0.4, not likely pneumonia given normal WBC, afebrile  --will check ammonia level, ABG this AM with pCO2 32  --does not appear to be atypical pneumonia at this time. We will follow along    More than 50% of the time documented was spent in face-to-face contact with the patient and in the care of the patient on the floor/unit where the patient is located. Thank you very much for this referral.  We appreciate the opportunity to participate in this patient's care. Will follow along with above stated plan.     ABRAHAM Solitario

## 2019-06-17 NOTE — PROGRESS NOTES
Interdisciplinary team rounds were held 6/17/2019 with the following team members:Care Management, Nursing, Nutrition, Pharmacy, Physical Therapy and Physician. Plan of care discussed. See clinical pathway and/or care plan for interventions and desired outcomes.

## 2019-06-17 NOTE — PROGRESS NOTES
Night coverage:    Called by nursing that patient hs decreased O2, increased RR and HR. Stat chest xray shows pulmonary congestion. Her procalcitonin negative. Doubt PNA. Suspect more CHF. Stop IVF. Give lasix. Check BNP in am. place benny, ins and outs.

## 2019-06-17 NOTE — PROGRESS NOTES
Care Management Interventions  Mode of Transport at Discharge: BLS  Transition of Care Consult (CM Consult): Discharge Planning, SNF  Discharge Durable Medical Equipment: No  Physical Therapy Consult: Yes  Occupational Therapy Consult: Yes  Current Support Network: Assisted Living  Discharge Location  Discharge Placement: Rehab Unit Subacute    Pt transferred from [facility]. Agree with previous CM assessment. CM to continue to follow for dc to SNF.

## 2019-06-17 NOTE — PROGRESS NOTES
#44R Silicone, Non-Latex zapata catheter placed with Yogesh Heck RN asst with immediate return of 250 ml clear, yellow urine.

## 2019-06-17 NOTE — PROGRESS NOTES
Aware of elevated mews score. Primary RN has made MD aware of patients current status. Primary RN will continue to monitor.

## 2019-06-17 NOTE — PROGRESS NOTES
Reported to Dr. Shana Pichardo, pt's temp 100.4, pulse 124, resp at 40 bpm, O2 sat 90% on O2 Airvo 40 L at 50%, pt more alert than earlier, with congestive, non-productive cough, Tylenol was given and RT made aware. No orders received at this time with plan to continue to monitor and notify Md, as needed. Hyacinth Thakkar RN Charge and Vitaly Norwood RN Supervisor aware.

## 2019-06-17 NOTE — PROGRESS NOTES
Reported to Dr. Marguerite Mancilla pt's continued tachycardia, tachypnea,  RT called for asst for O2 sat 86%, O2 increased with O2 sat 92%, neuro status changed, pt lethargic, /71, orders received.

## 2019-06-17 NOTE — PROGRESS NOTES
TRANSFER - IN REPORT:    Verbal report received from [facility], RN on [employer] being received from [facility] for routine progression of care. Report consisted of patients Situation, Background, Assessment and Recommendations(SBAR). Information from the following report(s) SBAR, ED Summary and MAR was reviewed. Opportunity for questions and clarification was provided. Assessment completed upon patients arrival to unit and care assumed. Patient received to room 323. Patient connected to monitor and assessment completed. Plan of care reviewed. Patient oriented to room and call light. Patient aware to use call light to communicate any chest pain or needs. Admission skin assessment completed with second RN and reveals the following: left great toe dried blood, bilateral lower legs and feet mottled, dark and cool, sacrum intact, no redness noted.

## 2019-06-17 NOTE — DISCHARGE SUMMARY
Hospitalist Discharge Summary     Patient ID:  Kaylin Thompson  [id number]  89 y.o.  1945  Admit date: 6/16/2019  2:35 PM  Discharge date and time: 6/17/2019  Attending: Laisha Jordan MD  PCP:  Rosy Dean MD  Treatment Team: Attending Provider: Laisha Jordan MD; Consulting Provider: Indio Carrillo MD; Consulting Provider: Sergio Elliott MD; Speech Language Pathologist: Perez Quiroz, REAGAN; Utilization Review: Max Escobar RN; : Gino Young; Consulting Provider: Mc Ramírez MD    Principal Diagnosis Acute systolic congestive heart failure [facility])   Hospital Problems as of 6/17/2019 Date Reviewed: 6/17/2019          Codes Class Noted - Resolved POA    Interatrial septal aneurysm with PFO ICD-10-CM: Q21.1  ICD-9-CM: 745.5  6/17/2019 - Present Unknown    Overview Signed 6/17/2019  8:47 AM by ABRAHAM Jc     10/2016: echo from [facility]:   · The left ventricle is small. · There is mild concentric left ventricular hypertrophy present. · The left ventricular systolic function is normal (55-65%). · Normal left ventricular diastolic function. · There is a patent foramen ovale present with right to left shunting   indicated by bubble study. · There is a bileaflet tilting disc mechanical aortic valve present. The   prosthetic valve function is normal.  · Mild tricuspid valve regurgitation. · Saline contrast was given to evaluate for intracardiac shunt.  Right to   left shunt seen at rest.  · There is an interatrial septal aneurysm present             * (Principal) Acute systolic congestive heart failure (HCC) ICD-10-CM: I50.21  ICD-9-CM: 428.21, 428.0  6/17/2019 - Present Yes        Elevated troponin I level ICD-10-CM: R74.8  ICD-9-CM: 790.6  6/17/2019 - Present Yes        Acute respiratory failure with hypoxia [facility]) ICD-10-CM: J96.01  ICD-9-CM: 518.81  6/16/2019 - Present Yes        Immunocompromised state due to drug therapy ICD-10-CM: P09.127  ICD-9-CM: V58.69  6/16/2019 - Present Yes        Mixed connective tissue disease ([facility] ICD-10-CM: M35.1  ICD-9-CM: 710.8  6/16/2019 - Present Yes        Medication induced coagulopathy ([facility] ICD-10-CM: D68.9, T50.905A  ICD-9-CM: 286.9, E947.9  6/16/2019 - Present Yes        Polypharmacy ICD-10-CM: R49.938  ICD-9-CM: V58.69  6/16/2019 - Present Yes        Diastolic CHF, acute on chronic (HCC) ICD-10-CM: I50.33  ICD-9-CM: 428.33, 428.0  5/9/2018 - Present Yes        Recurrent major depressive disorder, in partial remission ([facility] ICD-10-CM: F33.41  ICD-9-CM: 296.35  4/14/2017 - Present Yes        Seizure disorder ([facility] ICD-10-CM: G40.909  ICD-9-CM: 345.90  12/13/2016 - Present Yes        Acquired hypothyroidism ICD-10-CM: E03.9  ICD-9-CM: 244.9  9/12/2016 - Present Yes        Long term current use of anticoagulant therapy ICD-10-CM: Z79.01  ICD-9-CM: V58.61  Unknown - Present Yes        HTN (hypertension)--Dr. Govind Cosme ICD-10-CM: I10  ICD-9-CM: 401.9  1/17/2013 - Present Yes        S/P total hip arthroplasty ICD-10-CM: I51.360  ICD-9-CM: V43.64  1/17/2013 - Present Yes        Chronic pain (Chronic) ICD-10-CM: P39.92  ICD-9-CM: 338.29  2/4/2010 - Present Yes        S/P AVR (aortic valve replacement) (Chronic) ICD-10-CM: Z95.2  ICD-9-CM: V43.3  2/4/2010 - Present Yes                 HPI:  Jerome Kearney is a 69 yo F who resides at [facility], who was sent via EMS for hypoxic respiratory failure in setting of pneumonia and failing outpatient antibiotics (amoxicillin). Pertinent PMH:  Mixed connective tissue disease on several immunosuppressive agents (sulfasalazine, methotrexate, plaquenil, prednisone), chronic warfarin therapy for history of mechanical aortic valve replacement, and chronic pain  She is unable to provide history, but upon speaking with ER staff and staff at [facility], she started having worsened mental status and was found to be hypoxic with oxygen saturation in the low 80s. CXR showed R base pneumonia. Started on supplemental oxygen and given tobra/vanc/zosyn. She is confused and unable to provide history. Able to state her name; otherwise, answers 'yeah' to other questions and will not answer open ended questions.'      Hospital Course:  Please refer to the admission H&P for details of presentation. In summary, the patient is 69 yo F with multiple medical problems (as above), who was admitted 6/16 for presumed pneumonia. She was treated with broad spectrum IV abx, but her respiratory status worsened and repeat CXR more consistent with pulmonary edema. She was placed on Airvo 40L/65%. Antibiotics were stopped as procal 0.4 and WBC count normal.    She remained confused. Troponin checked today was elevated at 14.5 and echo showed new LV systolic dysfunction with LVEF of 20-25% and concern for Takostubo cardiomyopathy. Patient was not placed on heparin due to INR being 6.8. INR not reversed due to her having a mechanical aortic valve. Case discussed with cardiology and they will evaluate her when she arrives at [facility]. She is being transferred due to requiring a higher level of care. Significant Diagnostic Studies:       Labs: Results:       Chemistry Recent Labs     06/17/19  0607 06/16/19  1454   GLU 89 144*    134*   K 3.0* 3.5    99   CO2 24 28   BUN 16 20   CREA 0.65 0.78   CA 8.2* 8.9   AGAP 10 7   AP  --  62   TP  --  7.1   ALB  --  3.0*   GLOB  --  4.1*   AGRAT  --  0.7*      CBC w/Diff Recent Labs     06/17/19  0607 06/16/19  1454   WBC 7.7 9.0   RBC 3.86* 3.95*   HGB 11.6* 11.9   HCT 36.7 37.5   * 158   GRANS 87* 92*   LYMPH 7* 3*   EOS 0* 0*      Cardiac Enzymes No results for input(s): CPK, CKND1, AGNES in the last 72 hours.     No lab exists for component: CKRMB, TROIP   Coagulation Recent Labs     06/17/19  1011 06/16/19  1453   PTP 84.3* 76.2*   INR >6.8* >6.8*       Lipid Panel Lab Results   Component Value Date/Time Cholesterol, total 193 09/12/2016 09:17 AM    HDL Cholesterol 75 09/12/2016 09:17 AM    LDL, calculated 97 09/12/2016 09:17 AM    VLDL, calculated 21 09/12/2016 09:17 AM    Triglyceride 107 09/12/2016 09:17 AM    CHOL/HDL Ratio 2.5 03/03/2016 03:41 AM      BNP No results for input(s): BNPP in the last 72 hours. Liver Enzymes Recent Labs     06/16/19  1454   TP 7.1   ALB 3.0*   AP 62   SGOT 33      Thyroid Studies Lab Results   Component Value Date/Time    TSH 0.128 06/16/2019 02:54 PM          All Micro Results     Procedure Component Value Units Date/Time    CULTURE, BLOOD [[id number]] Collected:  06/16/19 1454    Order Status:  Completed Specimen:  Blood Updated:  06/17/19 0912     Special Requests: --        RIGHT  FOREARM       Culture result: NO GROWTH AFTER 18 HOURS       CULTURE, BLOOD [[id number]] Collected:  06/16/19 1523    Order Status:  Completed Specimen:  Blood Updated:  06/17/19 0912     Special Requests: --        LEFT  HAND       Culture result: NO GROWTH AFTER 17 HOURS       CULTURE, RESPIRATORY/SPUTUM/BRONCH Claude Levy [[id number]]     Order Status:  Sent Specimen:  Sputum           Imagine:    XR CHEST SNGL V   Final Result   IMPRESSION:      1. Bibasilar opacities, likely atelectasis or pneumonia. 2. No significant change compared to prior. CT HEAD WO CONT   Final Result   IMPRESSION: No CT evidence of acute intracranial abnormality. XR CHEST PORT   Final Result   Impression: Mild right basilar airspace opacity would raise suspicion for   pneumonia given the provided history.                          Discharge Exam:  Visit Vitals  BP 92/56 (BP 1 Location: Right arm, BP Patient Position: At rest)   Pulse 84   Temp 98.2 °F (36.8 °C)   Resp 16   Ht 5' 5\" (1.651 m)   Wt 68 kg (150 lb)   SpO2 96%   BMI 24.96 kg/m²     Patient Vitals for the past 24 hrs:   Temp Pulse Resp BP SpO2   06/17/19 0831 98.2 °F (36.8 °C) 84 16 92/56 96 %   06/17/19 0812     96 %   06/17/19 0533 98.7 °F (37.1 °C) 86 28 94/61 97 %   06/17/19 0155  (!) 106  98/66    06/17/19 0044 99.1 °F (37.3 °C) (!) 124 (!) 40 108/71 (!) 86 %   06/16/19 2344     91 %   06/16/19 2223 100.4 °F (38 °C) (!) 124 (!) 40 140/78 90 %   06/16/19 2024     96 %   06/16/19 1924 99.1 °F (37.3 °C) 78 22 142/77 95 %   06/16/19 1747 98.7 °F (37.1 °C) 73 19 152/72 94 %   06/16/19 1702  73 16  100 %   06/16/19 1633  70 18  100 %   06/16/19 1630  71 19  98 %   06/16/19 1550  75 18 120/59 97 %   06/16/19 1436 98.5 °F (36.9 °C) 82 26 117/58 (!) 88 %       General appearance: alert to voice but confused  Lungs: coarse and rales bilaterally, slight tachypnea  Heart: mechanical valve click, RRR  Abdomen: soft, non-tender. Bowel sounds normal. No masses,  no organomegaly  Extremities: no cyanosis or edema  Neurologic: Grossly normal    Disposition: [address] [place] [address]  Discharge Condition: stable but guarded  Patient Instructions:   Current Discharge Medication List            Follow-up  ·   Hospitalist and Cardiology team at [facility]    Signed:  Iza Winters.  Shanna López MD  6/17/2019  1:21 PM

## 2019-06-17 NOTE — PROGRESS NOTES
Called Anna Carter RT regarding pt's O2 sat 90% with O2 intact, resp at 40 bpm, pulse 124, discussing resp tx would increase pulse.  Nurse to notify Md.

## 2019-06-17 NOTE — PROGRESS NOTES
Problem: Dysphagia (Adult)  Goal: *Acute Goals and Plan of Care (Insert Text)  Description  LTG: Patient will tolerate least restrictive diet without overt signs or symptoms of airway compromise. STG: Patient will tolerate puree diet and thin liquids via cup without overt signs or symptoms of airway compromise. STG: Patient will participate in modified barium swallow study as clinically indicated. Outcome: Progressing Towards Goal    SPEECH LANGUAGE PATHOLOGY: BEDSIDE SWALLOW NOTE: Initial Assessment    NAME/AGE/GENDER: Delvin Goncalves is a 68 y.o. female  DATE: 6/17/2019  PRIMARY DIAGNOSIS: Pneumonia [J18.9]  Acute respiratory failure with hypoxia ([facility] [J96.01]       ICD-10: Treatment Diagnosis: R13.12 Oropharyngeal dysphagia  INTERDISCIPLINARY COLLABORATION: Registered Nurse  PRECAUTIONS/ALLERGIES: Latex; Bee sting [sting, bee]; Adhesive; and Diflucan [fluconazole]   ASSESSMENT:   Based on the objective data described below, Ms. Patsy Cintron presents with mild oropharyngeal dysphagia. Oral mech exam unremarkable. Patient currently NPO and on Airvo. Patient with delayed cough noted with thin liquids via straw. No overt s/sx airway compromise with ice chip, thin via spoon, thin via cup, and puree. Patient with laryngeal elevation present upon palpation with single swallow per bolus. Oral prep time and oral clearance WNL with puree across 3 trials. Patient declined trials of mechanical soft and regular consistency. Recommend puree diet and thin liquids via cup only. NO straws. Recommend 1:1 assistance with meals. Medication can be taken whole with liquid or floated in puree. Will continue to follow for diet tolerance and potential diet advancement. ?????? ? ? This section established at most recent assessment??????????  PROBLEM LIST (Impairments causing functional limitations):  Oropharyngeal dysphagia  REHABILITATION POTENTIAL FOR STATED GOALS: Fair  PLAN OF CARE:   Patient will benefit from skilled intervention to address the following impairments. RECOMMENDATIONS AND PLANNED INTERVENTIONS (Benefits and precautions of therapy have been discussed with the patient.):  PO:  Pureed  Liquids:  regular thin  MEDICATIONS:  With liquid  ASPIRATION PRECAUTIONS:  Slow rate of intake  Small bites/sips  Upright at 90 degrees during meal  COMPENSATORY STRATEGIES/MODIFICATIONS INCLUDING:  None  OTHER RECOMMENDATIONS (including follow up treatment recommendations):   Family training/education  Patient education  RECOMMENDED DIET MODIFICATIONS DISCUSSED WITH:  Nursing  Patient  FREQUENCY/DURATION: Continue to follow patient 3 times a week for duration of hospital stay to address above goals. RECOMMENDED REHABILITATION/EQUIPMENT: (at time of discharge pending progress): Due to the probability of continued deficits (see above) this patient will likely need continued skilled speech therapy after discharge. SUBJECTIVE:   Patient required encouragement to accept PO trials. Only responded to yes/no questions. Suspect poor endurance with PO due to respiratory status.    History of Present Injury/Illness: Ms. Patsy Cintron  has a past medical history of A-fib [facility]), Acquired hypothyroidism, Acute CVA (cerebrovascular accident) ([facility] (6/21/2014), Acute lacunar infarction [facility]), Anxiety state, unspecified, Aortic valve stenosis, congenital (2/19/2016), CAD (coronary artery disease), Cancer ([address] Candida Esophagitis (2/11/2010), Chronic kidney disease, Chronic pain, Depression, Duodenal ulcer, Dysphagia (2/19/2016), Embolism and thrombosis of unspecified artery ([address] (9/12/2014), Fibromyalgia, History of anemia (03/12/2012), History of drug overdose (09/2016), History of encephalopathy (02/04/2010), History of fracture of hip (03/10/2012), History of mastectomy, Hypertension, Immunosuppressed status ([address] (6/20/2014), Long term (current) use of anticoagulants, MCTD (mixed connective tissue disease) ([address] Nausea & vomiting, Osteoarthritis, Other ill-defined cerebrovascular disease, Pain in joint, pelvic region and thigh, Personal history of fall, Postmenopausal atrophic vaginitis, Primary localized osteoarthrosis, pelvic region and thigh, PUD (peptic ulcer disease) (2009), PVD (peripheral vascular disease) ([facility] Reflux esophagitis, S/P AVR (aortic valve replacement), Seasonal allergic rhinitis due to pollen, Seizures ([facility] Sleep disturbance, Symptomatic menopausal or female climacteric states, Thromboembolus ([facility] (2007), Unspecified disorder of liver, and Urticaria. She also has no past medical history of Difficult intubation, Malignant hyperthermia due to anesthesia, or Pseudocholinesterase deficiency. .  She also  has a past surgical history that includes hx hysterectomy (1975); hx tonsillectomy; hx other surgical; hx carpal tunnel release (Right, 1995); pr abdomen surgery proc unlisted (1990); hx colonoscopy (9/25/13); hx colonoscopy (2010); hx cataract removal (Right, 3/20/2014); hx tumor removal (1986); hx mastectomy (Bilateral, 2000); hx tonsil and adenoidectomy; hx aortic valve replacement (2004); hx aortic valve replacement; hx cholecystectomy; pr total hip arthroplasty (Right, 12/2012, 3/11/12, 2/2013); pr revise total hip replacement (Right, 1/17/13); hx acl reconstruction (Left, 1979); and egd (3/29/2019). Present Symptoms:    Pain Scale 1: Numeric (0 - 10)  Pain Intensity 1: 0  Current Medications:   No current facility-administered medications on file prior to encounter. Current Outpatient Medications on File Prior to Encounter   Medication Sig Dispense Refill    polyvinyl alcohol (LIQUIFILM TEARS) 1.4 % ophthalmic solution Administer 1 Drop to both eyes as needed. Indications: dry eye      raNITIdine (ZANTAC) 150 mg tablet Take 150 mg by mouth nightly. folic acid (FOLVITE) 1 mg tablet Take 1 Tab by mouth daily. 30 Tab 11    hydroxychloroquine (PLAQUENIL) 200 mg tablet Take 1.5 Tabs by mouth daily.  60 Tab 3    methotrexate (RHEUMATREX) 2.5 mg tablet Take 8 tablets once a week. Must have labs done before refills can be given. 32 Tab 3    amitriptyline (ELAVIL) 10 mg tablet Take  by mouth nightly. carvedilol (COREG) 12.5 mg tablet Take  by mouth two (2) times daily (with meals). venlafaxine (EFFEXOR) 75 mg tablet Take 225 mg by mouth daily. fluticasone (FLONASE ALLERGY RELIEF) 50 mcg/actuation nasal spray 2 Sprays by Both Nostrils route daily as needed for Rhinitis. baclofen (LIORESAL) 20 mg tablet Take 1 Tab by mouth four (4) times daily for 30 days. (Patient taking differently: Take 20 mg by mouth three (3) times daily.) 120 Tab 12    hydrocodone/acetaminophen (NORCO PO) Take 5 mg by mouth three (3) times daily as needed for Pain. acetaminophen (TYLENOL EXTRA STRENGTH) 500 mg tablet Take 1,000 mg by mouth every eight (8) hours. levothyroxine (SYNTHROID) 125 mcg tablet Take  by mouth Daily (before breakfast). hydroCHLOROthiazide (HYDRODIURIL) 25 mg tablet Take 1 Tab by mouth daily. 90 Tab 3    spironolactone (ALDACTONE) 25 mg tablet Take 1 Tab by mouth two (2) times a day. 180 Tab 3    cyanocobalamin 1,000 mcg tablet Take 1,000 mcg by mouth daily. diphenoxylate-atropine (LOMOTIL) 2.5-0.025 mg per tablet Take  by mouth four (4) times daily as needed for Diarrhea. nitroglycerin (NITROSTAT) 0.4 mg SL tablet 1 Tab by SubLINGual route every five (5) minutes as needed for Chest Pain. 25 Tab 5    cholecalciferol, vitamin D3, (VITAMIN D3) 2,000 unit tab Take  by mouth daily. dicyclomine (BENTYL) 10 mg capsule Take 1 Cap by mouth daily as needed. 30 Cap 0    ondansetron hcl (ZOFRAN, AS HYDROCHLORIDE,) 4 mg tablet Take 1 Tab by mouth every eight (8) hours as needed for Nausea. 60 Tab 0    pantoprazole (PROTONIX) 40 mg tablet Take 1 Tab by mouth daily. 30 Tab 0    warfarin (COUMADIN) 2 mg tablet 2 mg Tues, Thurs, Sat and 4 mg all other days.  Starting 7/4 (Patient taking differently: 2 mg on Mondays and Fridays; 4 mg all other days  Indications: Blood Clot caused by Artificial Heart Valve) 30 Tab 0    levETIRAcetam (KEPPRA) 500 mg tablet Take 500 mg by mouth two (2) times a day. traZODone (DESYREL) 100 mg tablet Take 2 hs prn sleep 60 Tab 5    sulfaSALAzine (AZULFIDINE) 500 mg tablet Take 1,000 mg by mouth three (3) times daily as needed. predniSONE (DELTASONE) 5 mg tablet Take 5 mg by mouth daily. zoledronic acid/mannitol-water (RECLAST IV) by IntraVENous route Once a year. Last infusion was 10/16/18         Current Dietary Status:     NPO  History of reflux:  YES   Reflux medication: Pantoprazole  Social History/Home Situation:       OBJECTIVE:   Respiratory Status:  Heated; Hi flow nasal cannula  40 l/min  CXR Results:bibasilar opacities likely atelectasis or penumonia  MRI/CT Results: NO acute abnormality. Oral Motor Structure/Speech:  Oral-Motor Structure/Motor Speech  Labial: No impairment  Dentition: Upper & lower dentures  Oral Hygiene: dry  Lingual: No impairment  Velum: No impairment    Cognitive and Communication Status:  Neurologic State: Alert;Drowsy  Orientation Level: Oriented to person;Disoriented to place; Disoriented to situation;Disoriented to time  Cognition: Decreased command following  Perception: Appears intact  Perseveration: No perseveration noted  Safety/Judgement: Not assessed    BEDSIDE SWALLOW EVALUATION  Oral Assessment:  Oral Assessment  Labial: No impairment  Dentition: Upper & lower dentures  Oral Hygiene: dry  Lingual: No impairment  Velum: No impairment  P.O. Trials:  Patient Position: upright in bed    The patient was given the following:   Consistency Presented: Puree; Thin liquid  How Presented: SLP-fed/presented;Cup/sip; Self-fed/presented;Straw;Spoon    ORAL PHASE:  Bolus Acceptance: No impairment  Bolus Formation/Control: No impairment  Propulsion: No impairment     Oral Residue: None    PHARYNGEAL PHASE:     Laryngeal Elevation: (Present upon palpation)  Aspiration Signs/Symptoms: Delayed cough/throat clear  Vocal Quality: Low volume           Pharyngeal Phase Characteristics: No impairment, issues, or problems     OTHER OBSERVATIONS:  Rate/bite size: Questionable   Endurance:  Impaired   Comments: Tool Used: Dysphagia Outcome and Severity Scale (MARÍA)    Score Comments   Normal Diet  ? 7 With no strategies or extra time needed   Functional Swallow  ? 6 May have mild oral or pharyngeal delay       Mild Dysphagia    ? 5 Which may require one diet consistency restricted (those who demonstrate penetration which is entirely cleared on MBS would be included)   Mild-Moderate Dysphagia  ? 4 With 1-2 diet consistencies restricted       Moderate Dysphagia  ? 3 With 2 or more diet consistencies restricted       Moderately Severe Dysphagia  ? 2 With partial PO strategies (trials with ST only)       Severe Dysphagia  ? 1 With inability to tolerate any PO safely          Score:  Initial: 4 Most Recent: X (Date: --)   Interpretation of Tool: The Dysphagia Outcome and Severity Scale (MARÍA) is a simple, easy-to-use, 7-point scale developed to systematically rate the functional severity of dysphagia based on objective assessment and make recommendations for diet level, independence level, and type of nutrition.        [place] / [place] / Product Type: Medicare /     TREATMENT:    (In addition to Assessment/Re-Assessment sessions the following treatments were rendered)  Assessment/Reassessment only, no treatment provided today  MODALITIES:                                                                    ORAL MOTOR  EXERCISES:                                                                                                                                                                      LARYNGEAL / PHARYNGEAL EXERCISES: __________________________________________________________________________________________________  Safety:   After treatment position/precautions:  Call light within reach  RN notified  Upright in Bed  Treatment Assessment:  oropharyngeal dysphagia. Progression/Medical Necessity:   Skilled intervention continues to be required due to persistent signs and symptoms of aspiration and patient still consuming a modified diet  . Compliance with Program/Exercises: Will assess as treatment progresses  Reason for Continuation of Services/Other Comments:  Patient continues to require skilled intervention due to medical complications  . Recommendations/Intent for next treatment session: \"Treatment next visit will focus on diet tolerance and potential diet advancement \".     Total Treatment Duration:  Time In: 1331  Time Out: [address]

## 2019-06-17 NOTE — PROGRESS NOTES
Portable chest xray done. Dr. Rosi Gonzalez in room; orders received. Clarified BNP, to be drawn with a.m labs, not 0400. IVF stopped, including remaining antibiotic.

## 2019-06-17 NOTE — PROGRESS NOTES
TRANSFER - OUT REPORT:    Verbal report given to [employer] on [employer]  being transferred to [facility] room number 323  for routine progression of care       Report consisted of patients Situation, Background, Assessment and   Recommendations(SBAR). Information from the following report(s) SBAR, Kardex, ED Summary, Intake/Output, MAR and Recent Results was reviewed with the receiving nurse. Lines:   Peripheral IV 06/16/19 Left Forearm (Active)   Site Assessment Clean, dry, & intact 6/17/2019  8:23 AM   Phlebitis Assessment 0 6/17/2019  8:23 AM   Infiltration Assessment 0 6/17/2019  8:23 AM   Dressing Status Clean, dry, & intact 6/17/2019  8:23 AM   Dressing Type Transparent;Tape 6/17/2019  8:23 AM   Hub Color/Line Status Capped 6/17/2019  8:23 AM       Peripheral IV 06/16/19 Right Forearm (Active)   Site Assessment Clean, dry, & intact 6/17/2019  8:23 AM   Phlebitis Assessment 0 6/17/2019  8:23 AM   Infiltration Assessment 0 6/17/2019  8:23 AM   Dressing Status Clean, dry, & intact 6/17/2019  8:23 AM   Dressing Type Transparent;Tape 6/17/2019  8:23 AM   Hub Color/Line Status Infusing 6/17/2019  8:23 AM        Opportunity for questions and clarification was provided.       Patient transported with:   Monitor  O2 @ 40 liters

## 2019-06-17 NOTE — PROGRESS NOTES
Care Management Interventions  PCP Verified by CM: Yes  Transition of Care Consult (CM Consult): Discharge Planning  Physical Therapy Consult: Yes  Occupational Therapy Consult: Yes  Current Support Network: Assisted Living  Plan discussed with Pt/Family/Caregiver: Yes  Discharge Location  Discharge Placement: Skilled nursing facility      PARMINDER provided update to SW Melchor Krabbe ) at [facility]. PARMINDER also spoke with Jhony Morales at [address] [place] [address] & informed pt was transferred to [facility] & name of SW they can follow up with. Chart reviewed. PTA pt residing at [address] [place] [address]. PARMINDER spoke with Darby Montelongo RN at [facility] who states pt was able to transfer indep from bed to w/c and propel herself around facility. Pt has a son but they are estranged. Per MD pt will likely need STR at d/c.  PPD placed,  PT and OT evaluations are pending. PARMINDER will continue to follow.

## 2019-06-17 NOTE — PROGRESS NOTES
Resting quietly, vitals reassessed, skin warm, dry. Opens eyes at times, lethargic, less interactive verbally with nurse. O2 sat 86%. T 99.1 pulse 124, resp 40 bpm with O2, /71.

## 2019-06-17 NOTE — PROGRESS NOTES
Hospitalist Progress Note    2019  Admit Date: 2019  2:35 PM   NAME: Kaylin Thompson   :  1945   MRN:  [id number]   Attending: Laisha Jordan MD  PCP:  Rosy Dean MD    SUBJECTIVE:   Eliu Anderson is a 69 yo F resident of [facility], with hx of Mixed connective tissue disease on several immunosuppressive agents (sulfasalazine, methotrexate, plaquenil, prednisone), chronic warfarin therapy for history of mechanical aortic valve replacement, LVDD, and chronic pain, who resides at [facility], who was admitted  with acute hypoxic respiratory failure and presumed pneumonia. She was initially started on IV abx. Her respiratory symptoms worsened overnight and CXR with worsening pulmonary edema. She was given 1 dose of IV lasix this AM.  Her WBC count remains normal and procal was not elevated. BNP elevated at 818. At time of my evaluation, she is not as somnolent but is still unable to provide history and is confused. She is on Airvo.           Review of Systems negative with exception of pertinent positives noted above  PHYSICAL EXAM     Visit Vitals  BP 92/56 (BP 1 Location: Right arm, BP Patient Position: At rest)   Pulse 84   Temp 98.2 °F (36.8 °C)   Resp 16   Ht 5' 5\" (1.651 m)   Wt 68 kg (150 lb)   SpO2 96%   BMI 24.96 kg/m²      Temp (24hrs), Av °F (37.2 °C), Min:98.2 °F (36.8 °C), Max:100.4 °F (38 °C)    Patient Vitals for the past 24 hrs:   Temp Pulse Resp BP SpO2   19 0831 98.2 °F (36.8 °C) 84 16 92/56 96 %   19 0812     96 %   19 0533 98.7 °F (37.1 °C) 86 28 94/61 97 %   19 0155  (!) 106  98/66    19 0044 99.1 °F (37.3 °C) (!) 124 (!) 40 108/71 (!) 86 %   19 2344     91 %   19 2223 100.4 °F (38 °C) (!) 124 (!) 40 140/78 90 %   19 2024     96 %   19 1924 99.1 °F (37.3 °C) 78 22 142/77 95 %   19 1747 98.7 °F (37.1 °C) 73 19 152/72 94 %   19 1702  73 16  100 %   19 1633  70 18  100 %   06/16/19 1630  71 19  98 %   06/16/19 1550  75 18 120/59 97 %   06/16/19 1436 98.5 °F (36.9 °C) 82 26 117/58 (!) 88 %       Oxygen Therapy  O2 Sat (%): 96 % (06/17/19 0831)  Pulse via Oximetry: 90 beats per minute (06/17/19 0812)  O2 Device: Heated; Hi flow nasal cannula (06/17/19 0823)  O2 Flow Rate (L/min): 40 l/min (06/17/19 0823)  O2 Temperature: 87.8 °F (31 °C) (06/17/19 0812)  FIO2 (%): 65 % (06/17/19 0823)    Intake/Output Summary (Last 24 hours) at 6/17/2019 1115  Last data filed at 6/17/2019 0545  Gross per 24 hour   Intake    Output 875 ml   Net -875 ml      General: Ill appearing, elderly    Lungs:  Coarse bilaterally. Heart:  Regular rate and rhythm,  No murmur, rub, or gallop  Abdomen: Soft, Non distended, Non tender, Positive bowel sounds  Extremities: No cyanosis, clubbing or edema  Neurologic:  No focal deficits    Recent Results (from the past 24 hour(s))   POC LACTIC ACID    Collection Time: 06/16/19  2:50 PM   Result Value Ref Range    Lactic Acid (POC) 1.17 0.5 - 1.9 mmol/L   PROTHROMBIN TIME + INR    Collection Time: 06/16/19  2:53 PM   Result Value Ref Range    Prothrombin time 76.2 (H) 11.7 - 14.5 sec    INR >6.8 (HH)    CBC WITH AUTOMATED DIFF    Collection Time: 06/16/19  2:54 PM   Result Value Ref Range    WBC 9.0 4.3 - 11.1 K/uL    RBC 3.95 (L) 4.05 - 5.2 M/uL    HGB 11.9 11.7 - 15.4 g/dL    HCT 37.5 35.8 - 46.3 %    MCV 94.9 79.6 - 97.8 FL    MCH 30.1 26.1 - 32.9 PG    MCHC 31.7 31.4 - 35.0 g/dL    RDW 15.9 (H) 11.9 - 14.6 %    PLATELET 185 827 - 847 K/uL    MPV 9.1 (L) 9.4 - 12.3 FL    ABSOLUTE NRBC 0.00 0.0 - 0.2 K/uL    DF AUTOMATED      NEUTROPHILS 92 (H) 43 - 78 %    LYMPHOCYTES 3 (L) 13 - 44 %    MONOCYTES 3 (L) 4.0 - 12.0 %    EOSINOPHILS 0 (L) 0.5 - 7.8 %    BASOPHILS 0 0.0 - 2.0 %    IMMATURE GRANULOCYTES 1 0.0 - 5.0 %    ABS. NEUTROPHILS 8.3 (H) 1.7 - 8.2 K/UL    ABS. LYMPHOCYTES 0.3 (L) 0.5 - 4.6 K/UL    ABS. MONOCYTES 0.3 0.1 - 1.3 K/UL    ABS. EOSINOPHILS 0.0 0.0 - 0.8 K/UL    ABS. BASOPHILS 0.0 0.0 - 0.2 K/UL    ABS. IMM. GRANS. 0.1 0.0 - 0.5 K/UL   METABOLIC PANEL, COMPREHENSIVE    Collection Time: 06/16/19  2:54 PM   Result Value Ref Range    Sodium 134 (L) 136 - 145 mmol/L    Potassium 3.5 3.5 - 5.1 mmol/L    Chloride 99 98 - 107 mmol/L    CO2 28 21 - 32 mmol/L    Anion gap 7 7 - 16 mmol/L    Glucose 144 (H) 65 - 100 mg/dL    BUN 20 8 - 23 MG/DL    Creatinine 0.78 0.6 - 1.0 MG/DL    GFR est AA >60 >60 ml/min/1.73m2    GFR est non-AA >60 >60 ml/min/1.73m2    Calcium 8.9 8.3 - 10.4 MG/DL    Bilirubin, total 0.3 0.2 - 1.1 MG/DL    ALT (SGPT) 27 12 - 65 U/L    AST (SGOT) 33 15 - 37 U/L    Alk.  phosphatase 62 50 - 130 U/L    Protein, total 7.1 6.3 - 8.2 g/dL    Albumin 3.0 (L) 3.2 - 4.6 g/dL    Globulin 4.1 (H) 2.3 - 3.5 g/dL    A-G Ratio 0.7 (L) 1.2 - 3.5     CULTURE, BLOOD    Collection Time: 06/16/19  2:54 PM   Result Value Ref Range    Special Requests: RIGHT  FOREARM        Culture result: NO GROWTH AFTER 18 HOURS     TSH 3RD GENERATION    Collection Time: 06/16/19  2:54 PM   Result Value Ref Range    TSH 0.128 uIU/mL   PROCALCITONIN    Collection Time: 06/16/19  2:54 PM   Result Value Ref Range    Procalcitonin 0.4 ng/mL   EKG, 12 LEAD, INITIAL    Collection Time: 06/16/19  2:55 PM   Result Value Ref Range    Ventricular Rate 81 BPM    Atrial Rate 81 BPM    P-R Interval 142 ms    QRS Duration 126 ms    Q-T Interval 404 ms    QTC Calculation (Bezet) 469 ms    Calculated P Axis 47 degrees    Calculated R Axis 4 degrees    Calculated T Axis 85 degrees    Diagnosis       Normal sinus rhythm  Possible Left atrial enlargement  Left bundle branch block  Abnormal ECG  When compared with ECG of 09-NOV-2016 22:42,  Nonspecific T wave abnormality, worse in Lateral leads  Confirmed by Neda Kirk ([id number]) on 6/17/2019 8:42:29 AM     CULTURE, BLOOD    Collection Time: 06/16/19  3:23 PM   Result Value Ref Range    Special Requests: LEFT  HAND        Culture result: NO GROWTH AFTER 17 HOURS     URINALYSIS W/ RFLX MICROSCOPIC    Collection Time: 06/16/19  3:23 PM   Result Value Ref Range    Color YELLOW      Appearance CLEAR      Specific gravity 1.021 1.001 - 1.023      pH (UA) 6.0 5.0 - 9.0      Protein 30 (A) NEG mg/dL    Glucose NEGATIVE  mg/dL    Ketone NEGATIVE  NEG mg/dL    Bilirubin NEGATIVE  NEG      Blood SMALL (A) NEG      Urobilinogen 0.2 0.2 - 1.0 EU/dL    Nitrites NEGATIVE  NEG      Leukocyte Esterase NEGATIVE  NEG      WBC 0-3 0 /hpf    RBC 3-5 0 /hpf    Epithelial cells 0-3 0 /hpf    Bacteria 0 0 /hpf   POC G3    Collection Time: 06/16/19  6:22 PM   Result Value Ref Range    Device: ROOM AIR      FIO2 (POC) 21 %    pH (POC) 7.410 7.35 - 7.45      pCO2 (POC) 35.7 35 - 45 MMHG    pO2 (POC) 46 (L) 75 - 100 MMHG    HCO3 (POC) 22.6 22 - 26 MMOL/L    sO2 (POC) 82 (L) 95 - 98 %    Base deficit (POC) 2 mmol/L    Allens test (POC) YES      Site RIGHT RADIAL      Patient temp. 98.6      Specimen type (POC) ARTERIAL      Performed by CheloqueRobin     CO2, POC 24 MMOL/L    Critical value read back 00:02     COLLECT TIME 1,818     POC G3    Collection Time: 06/17/19  1:33 AM   Result Value Ref Range    Device: High Flow Nasal Cannula      FIO2 (POC) 65 %    pH (POC) 7.440 7.35 - 7.45      pCO2 (POC) 32.1 (L) 35 - 45 MMHG    pO2 (POC) 77 75 - 100 MMHG    HCO3 (POC) 21.8 (L) 22 - 26 MMOL/L    sO2 (POC) 96 95 - 98 %    Base deficit (POC) 2 mmol/L    Allens test (POC) YES      Site RIGHT RADIAL      Patient temp.  98.6      Specimen type (POC) ARTERIAL      Performed by [email or web address]     CO2, POC 23 MMOL/L    COLLECT TIME 130     TOBRAMYCIN, RANDOM    Collection Time: 06/17/19  1:53 AM   Result Value Ref Range    Tobramycin,random 1.5 ug/ml   METABOLIC PANEL, BASIC    Collection Time: 06/17/19  6:07 AM   Result Value Ref Range    Sodium 136 136 - 145 mmol/L    Potassium 3.0 (L) 3.5 - 5.1 mmol/L    Chloride 102 98 - 107 mmol/L    CO2 24 21 - 32 mmol/L    Anion gap 10 7 - 16 mmol/L    Glucose 89 65 - 100 mg/dL    BUN 16 8 - 23 MG/DL    Creatinine 0.65 0.6 - 1.0 MG/DL    GFR est AA >60 >60 ml/min/1.73m2    GFR est non-AA >60 >60 ml/min/1.73m2    Calcium 8.2 (L) 8.3 - 10.4 MG/DL   CBC WITH AUTOMATED DIFF    Collection Time: 06/17/19  6:07 AM   Result Value Ref Range    WBC 7.7 4.3 - 11.1 K/uL    RBC 3.86 (L) 4.05 - 5.2 M/uL    HGB 11.6 (L) 11.7 - 15.4 g/dL    HCT 36.7 35.8 - 46.3 %    MCV 95.1 79.6 - 97.8 FL    MCH 30.1 26.1 - 32.9 PG    MCHC 31.6 31.4 - 35.0 g/dL    RDW 16.3 (H) 11.9 - 14.6 %    PLATELET 764 (L) 696 - 450 K/uL    MPV 8.8 (L) 9.4 - 12.3 FL    ABSOLUTE NRBC 0.00 0.0 - 0.2 K/uL    DF AUTOMATED      NEUTROPHILS 87 (H) 43 - 78 %    LYMPHOCYTES 7 (L) 13 - 44 %    MONOCYTES 4 4.0 - 12.0 %    EOSINOPHILS 0 (L) 0.5 - 7.8 %    BASOPHILS 0 0.0 - 2.0 %    IMMATURE GRANULOCYTES 1 0.0 - 5.0 %    ABS. NEUTROPHILS 6.7 1.7 - 8.2 K/UL    ABS. LYMPHOCYTES 0.5 0.5 - 4.6 K/UL    ABS. MONOCYTES 0.3 0.1 - 1.3 K/UL    ABS. EOSINOPHILS 0.0 0.0 - 0.8 K/UL    ABS. BASOPHILS 0.0 0.0 - 0.2 K/UL    ABS. IMM.  GRANS. 0.1 0.0 - 0.5 K/UL   BNP    Collection Time: 06/17/19  6:07 AM   Result Value Ref Range     pg/mL   AMMONIA    Collection Time: 06/17/19  9:56 AM   Result Value Ref Range    Ammonia 21 (L) 24.9 - 68 UMOL/L     All Micro Results     Procedure Component Value Units Date/Time    CULTURE, BLOOD [[id number]] Collected:  06/16/19 1454    Order Status:  Completed Specimen:  Blood Updated:  06/17/19 0912     Special Requests: --        RIGHT  FOREARM       Culture result: NO GROWTH AFTER 18 HOURS       CULTURE, BLOOD [[id number]] Collected:  06/16/19 1523    Order Status:  Completed Specimen:  Blood Updated:  06/17/19 0912     Special Requests: --        LEFT  HAND       Culture result: NO GROWTH AFTER 17 HOURS       CULTURE, RESPIRATORY/SPUTUM/BRONCH [place] STAIN [[id number]]     Order Status:  Sent Specimen:  Sputum         Imaging:    XR CHEST SNGL V   Final Result   IMPRESSION: 1. Bibasilar opacities, likely atelectasis or pneumonia. 2. No significant change compared to prior. CT HEAD WO CONT   Final Result   IMPRESSION: No CT evidence of acute intracranial abnormality. XR CHEST PORT   Final Result   Impression: Mild right basilar airspace opacity would raise suspicion for   pneumonia given the provided history. ASSESSMENT      Hospital Problems as of 6/17/2019 Date Reviewed: 6/17/2019          Codes Class Noted - Resolved POA    Interatrial septal aneurysm with PFO ICD-10-CM: Q21.1  ICD-9-CM: 745.5  6/17/2019 - Present Unknown    Overview Signed 6/17/2019  8:47 AM by ABRAHAM Caraballo     10/2016: echo from [address]:   · The left ventricle is small. · There is mild concentric left ventricular hypertrophy present. · The left ventricular systolic function is normal (55-65%). · Normal left ventricular diastolic function. · There is a patent foramen ovale present with right to left shunting   indicated by bubble study. · There is a bileaflet tilting disc mechanical aortic valve present. The   prosthetic valve function is normal.  · Mild tricuspid valve regurgitation. · Saline contrast was given to evaluate for intracardiac shunt.  Right to   left shunt seen at rest.  · There is an interatrial septal aneurysm present             * (Principal) Pneumonia ICD-10-CM: J18.9  ICD-9-CM: 486  6/16/2019 - Present Yes        Acute respiratory failure with hypoxia [facility] ICD-10-CM: J96.01  ICD-9-CM: 518.81  6/16/2019 - Present Yes        Immunocompromised state due to drug therapy ICD-10-CM: Z79.899  ICD-9-CM: V58.69  6/16/2019 - Present Yes        Mixed connective tissue disease ([facility] ICD-10-CM: M35.1  ICD-9-CM: 710.8  6/16/2019 - Present Yes        Medication induced coagulopathy ([facility] ICD-10-CM: D68.9, T50.905A  ICD-9-CM: 286.9, E947.9  6/16/2019 - Present Yes        Polypharmacy ICD-10-CM: W13.891  ICD-9-CM: V58.69  6/16/2019 - Present Yes Diastolic CHF, acute on chronic [facility]) ICD-10-CM: I50.33  ICD-9-CM: 428.33, 428.0  5/9/2018 - Present Yes        Recurrent major depressive disorder, in partial remission ([facility] ICD-10-CM: F33.41  ICD-9-CM: 296.35  4/14/2017 - Present Yes        Seizure disorder ([facility] ICD-10-CM: G40.909  ICD-9-CM: 345.90  12/13/2016 - Present Yes        Acquired hypothyroidism ICD-10-CM: E03.9  ICD-9-CM: 244.9  9/12/2016 - Present Yes        Long term current use of anticoagulant therapy ICD-10-CM: Z79.01  ICD-9-CM: V58.61  Unknown - Present Yes        HTN (hypertension)--Dr. Arias Nash ICD-10-CM: I10  ICD-9-CM: 401.9  1/17/2013 - Present Yes        S/P total hip arthroplasty ICD-10-CM: V90.089  ICD-9-CM: V43.64  1/17/2013 - Present Yes        Chronic pain (Chronic) ICD-10-CM: U86.14  ICD-9-CM: 338.29  2/4/2010 - Present Yes        S/P AVR (aortic valve replacement) (Chronic) ICD-10-CM: Z95.2  ICD-9-CM: V43.3  2/4/2010 - Present Yes            Plan:  · Acute hypoxic respiratory failure with history of LV diastolic dysfuncion  · Start lasix gtt as bp low normal  · BNP elevated  · Recheck echo  · Strict I/O  · Stop abx- suspect more diastolic CHF than pneumonia at this point  · Pulmonology consulted due to immunosuppression and ? Of pneumonia on admission  · Check troponin    · Altered mental status/encephalopathy  · ? If related to significant hypoxia prior to admission  · Monitor  · If not improved over 24-36 hours, consult neurology    · Aortic valve replacement-  · Resume coumadin when INR within range- pharmacy following    · Supratherapeutic INR- monitor    · Mixed Connective Tissue Disease- continue immunosuppresive treatment    DVT Prophylaxis: Coumadin    Signed By: Amisha Loera MD     June 17, 2019

## 2019-06-18 ENCOUNTER — PATIENT OUTREACH (OUTPATIENT)
Dept: CASE MANAGEMENT | Age: 74
End: 2019-06-18

## 2019-06-18 ENCOUNTER — APPOINTMENT (OUTPATIENT)
Dept: GENERAL RADIOLOGY | Age: 74
DRG: 291 | End: 2019-06-18
Attending: INTERNAL MEDICINE
Payer: MEDICARE

## 2019-06-18 LAB
ANION GAP SERPL CALC-SCNC: 10 MMOL/L (ref 7–16)
BASOPHILS # BLD: 0 K/UL (ref 0–0.2)
BASOPHILS NFR BLD: 0 % (ref 0–2)
BUN SERPL-MCNC: 23 MG/DL (ref 8–23)
CALCIUM SERPL-MCNC: 8.8 MG/DL (ref 8.3–10.4)
CHLORIDE SERPL-SCNC: 99 MMOL/L (ref 98–107)
CO2 SERPL-SCNC: 27 MMOL/L (ref 21–32)
CREAT SERPL-MCNC: 0.61 MG/DL (ref 0.6–1)
DIFFERENTIAL METHOD BLD: ABNORMAL
EOSINOPHIL # BLD: 0 K/UL (ref 0–0.8)
EOSINOPHIL NFR BLD: 0 % (ref 0.5–7.8)
ERYTHROCYTE [DISTWIDTH] IN BLOOD BY AUTOMATED COUNT: 16 % (ref 11.9–14.6)
GLUCOSE SERPL-MCNC: 125 MG/DL (ref 65–100)
HCT VFR BLD AUTO: 36.7 % (ref 35.8–46.3)
HGB BLD-MCNC: 11.7 G/DL (ref 11.7–15.4)
IMM GRANULOCYTES # BLD AUTO: 0 K/UL (ref 0–0.5)
IMM GRANULOCYTES NFR BLD AUTO: 1 % (ref 0–5)
INR PPP: 6.5
LYMPHOCYTES # BLD: 0.7 K/UL (ref 0.5–4.6)
LYMPHOCYTES NFR BLD: 11 % (ref 13–44)
MCH RBC QN AUTO: 30.3 PG (ref 26.1–32.9)
MCHC RBC AUTO-ENTMCNC: 31.9 G/DL (ref 31.4–35)
MCV RBC AUTO: 95.1 FL (ref 79.6–97.8)
MM INDURATION POC: 0 MM (ref 0–5)
MONOCYTES # BLD: 0.3 K/UL (ref 0.1–1.3)
MONOCYTES NFR BLD: 5 % (ref 4–12)
NEUTS SEG # BLD: 5.3 K/UL (ref 1.7–8.2)
NEUTS SEG NFR BLD: 83 % (ref 43–78)
NRBC # BLD: 0 K/UL (ref 0–0.2)
PLATELET # BLD AUTO: 157 K/UL (ref 150–450)
PMV BLD AUTO: 9.2 FL (ref 9.4–12.3)
POTASSIUM SERPL-SCNC: 3.3 MMOL/L (ref 3.5–5.1)
PPD POC: NEGATIVE NEGATIVE
PROTHROMBIN TIME: 56.1 SEC (ref 11.7–14.5)
RBC # BLD AUTO: 3.86 M/UL (ref 4.05–5.2)
SODIUM SERPL-SCNC: 136 MMOL/L (ref 136–145)
WBC # BLD AUTO: 6.4 K/UL (ref 4.3–11.1)

## 2019-06-18 PROCEDURE — 94760 N-INVAS EAR/PLS OXIMETRY 1: CPT

## 2019-06-18 PROCEDURE — 99223 1ST HOSP IP/OBS HIGH 75: CPT | Performed by: NURSE PRACTITIONER

## 2019-06-18 PROCEDURE — 74011250637 HC RX REV CODE- 250/637: Performed by: INTERNAL MEDICINE

## 2019-06-18 PROCEDURE — P9047 ALBUMIN (HUMAN), 25%, 50ML: HCPCS | Performed by: INTERNAL MEDICINE

## 2019-06-18 PROCEDURE — 85610 PROTHROMBIN TIME: CPT

## 2019-06-18 PROCEDURE — 36415 COLL VENOUS BLD VENIPUNCTURE: CPT

## 2019-06-18 PROCEDURE — 85025 COMPLETE CBC W/AUTO DIFF WBC: CPT

## 2019-06-18 PROCEDURE — 94640 AIRWAY INHALATION TREATMENT: CPT

## 2019-06-18 PROCEDURE — 99232 SBSQ HOSP IP/OBS MODERATE 35: CPT | Performed by: INTERNAL MEDICINE

## 2019-06-18 PROCEDURE — 74011636637 HC RX REV CODE- 636/637: Performed by: INTERNAL MEDICINE

## 2019-06-18 PROCEDURE — 97162 PT EVAL MOD COMPLEX 30 MIN: CPT

## 2019-06-18 PROCEDURE — 71045 X-RAY EXAM CHEST 1 VIEW: CPT

## 2019-06-18 PROCEDURE — 74011250636 HC RX REV CODE- 250/636: Performed by: INTERNAL MEDICINE

## 2019-06-18 PROCEDURE — 80048 BASIC METABOLIC PNL TOTAL CA: CPT

## 2019-06-18 PROCEDURE — 97530 THERAPEUTIC ACTIVITIES: CPT

## 2019-06-18 PROCEDURE — 77010033711 HC HIGH FLOW OXYGEN

## 2019-06-18 PROCEDURE — 74011000250 HC RX REV CODE- 250: Performed by: INTERNAL MEDICINE

## 2019-06-18 PROCEDURE — 76450000000

## 2019-06-18 PROCEDURE — 92526 ORAL FUNCTION THERAPY: CPT

## 2019-06-18 PROCEDURE — 65660000000 HC RM CCU STEPDOWN

## 2019-06-18 RX ORDER — POTASSIUM CHLORIDE 1.5 G/1.77G
20 POWDER, FOR SOLUTION ORAL DAILY
Status: DISCONTINUED | OUTPATIENT
Start: 2019-06-19 | End: 2019-06-18

## 2019-06-18 RX ORDER — LEVALBUTEROL INHALATION SOLUTION 0.63 MG/3ML
0.63 SOLUTION RESPIRATORY (INHALATION)
Status: DISCONTINUED | OUTPATIENT
Start: 2019-06-18 | End: 2019-06-21 | Stop reason: HOSPADM

## 2019-06-18 RX ORDER — POTASSIUM CHLORIDE 1.5 G/1.77G
20 POWDER, FOR SOLUTION ORAL DAILY
Status: DISCONTINUED | OUTPATIENT
Start: 2019-06-18 | End: 2019-06-21 | Stop reason: HOSPADM

## 2019-06-18 RX ORDER — ALBUMIN HUMAN 250 G/1000ML
12.5 SOLUTION INTRAVENOUS
Status: COMPLETED | OUTPATIENT
Start: 2019-06-18 | End: 2019-06-18

## 2019-06-18 RX ORDER — BUDESONIDE 0.5 MG/2ML
500 INHALANT ORAL
Status: DISCONTINUED | OUTPATIENT
Start: 2019-06-18 | End: 2019-06-21 | Stop reason: HOSPADM

## 2019-06-18 RX ADMIN — LEVETIRACETAM 500 MG: 500 TABLET ORAL at 17:08

## 2019-06-18 RX ADMIN — LEVOTHYROXINE SODIUM 125 MCG: 125 TABLET ORAL at 08:15

## 2019-06-18 RX ADMIN — Medication 5 ML: at 22:37

## 2019-06-18 RX ADMIN — VENLAFAXINE HYDROCHLORIDE 225 MG: 75 CAPSULE, EXTENDED RELEASE ORAL at 08:15

## 2019-06-18 RX ADMIN — FOLIC ACID 1 MG: 1 TABLET ORAL at 08:15

## 2019-06-18 RX ADMIN — LEVETIRACETAM 500 MG: 500 TABLET ORAL at 08:15

## 2019-06-18 RX ADMIN — PREDNISONE 5 MG: 5 TABLET ORAL at 08:15

## 2019-06-18 RX ADMIN — HYDROXYCHLOROQUINE SULFATE 300 MG: 200 TABLET, FILM COATED ORAL at 08:15

## 2019-06-18 RX ADMIN — HYDROCODONE BITARTRATE AND ACETAMINOPHEN 1 TABLET: 5; 325 TABLET ORAL at 17:11

## 2019-06-18 RX ADMIN — POTASSIUM CHLORIDE 20 MEQ: 1.5 POWDER, FOR SOLUTION ORAL at 17:08

## 2019-06-18 RX ADMIN — SULFASALAZINE 1000 MG: 500 TABLET ORAL at 11:58

## 2019-06-18 RX ADMIN — SULFASALAZINE 1000 MG: 500 TABLET ORAL at 17:08

## 2019-06-18 RX ADMIN — ACETAMINOPHEN 1000 MG: 500 TABLET, FILM COATED ORAL at 13:54

## 2019-06-18 RX ADMIN — SODIUM CHLORIDE 250 ML: 900 INJECTION, SOLUTION INTRAVENOUS at 18:32

## 2019-06-18 RX ADMIN — ALBUMIN (HUMAN) 12.5 G: 0.25 INJECTION, SOLUTION INTRAVENOUS at 20:12

## 2019-06-18 RX ADMIN — LEVALBUTEROL HYDROCHLORIDE 0.63 MG: 0.63 SOLUTION RESPIRATORY (INHALATION) at 19:56

## 2019-06-18 RX ADMIN — PANTOPRAZOLE SODIUM 40 MG: 40 TABLET, DELAYED RELEASE ORAL at 08:15

## 2019-06-18 RX ADMIN — ACETAMINOPHEN 1000 MG: 500 TABLET, FILM COATED ORAL at 06:24

## 2019-06-18 RX ADMIN — ACETAMINOPHEN 1000 MG: 500 TABLET, FILM COATED ORAL at 22:36

## 2019-06-18 RX ADMIN — Medication 10 ML: at 06:27

## 2019-06-18 RX ADMIN — HYDROCODONE BITARTRATE AND ACETAMINOPHEN 1 TABLET: 5; 325 TABLET ORAL at 03:39

## 2019-06-18 RX ADMIN — SULFASALAZINE 1000 MG: 500 TABLET ORAL at 08:15

## 2019-06-18 RX ADMIN — FAMOTIDINE 20 MG: 20 TABLET ORAL at 17:08

## 2019-06-18 NOTE — PROGRESS NOTES
Julee Hodgesu  Admission Date: 6/17/2019             Daily Progress Note: 6/18/2019    The patient's chart is reviewed and the patient is discussed with the staff. Patient is a 68 y.o.  female seen and evaluated at the request of Dr. Lilia Majano. Pt has a history of mixed connective tissue disease on Reclast, sulfasalazine, methotrexate, plaquenil, and prednisone 5mg daily, mechanical aortic valve on chronic coumadin, dementia, and chronic pain. She resides at [facility] and was recently treated for pneumonia. She presented to the ER at [facility] on 6/16/19 with acute respiratory failure with hypoxemia. She was admitted and we were consulted to assist. She was initially treated for pneumonia but her BNP was 818 this AM and abx discontinued and she was given IV lasix early this AM.   Pt lying in bed on 40L at 65%. Pt only awakes briefly and moans. She was unable to answer any questions. Per report, at [facility], she is alert and able to converse. PCO2 this AM was 32. She is unable to assist with any history.          Subjective:     Moved from [place] side  On optiflow  Feels breathing is better ,answers but the falls back to sleep    Current Facility-Administered Medications   Medication Dose Route Frequency    sodium chloride (NS) flush 5-40 mL  5-40 mL IntraVENous Q8H    sodium chloride (NS) flush 5-40 mL  5-40 mL IntraVENous PRN    acetaminophen (TYLENOL) tablet 1,000 mg  1,000 mg Oral Q8H    albuterol (PROVENTIL VENTOLIN) nebulizer solution 2.5 mg  2.5 mg Nebulization Q6H PRN    dicyclomine (BENTYL) capsule 10 mg  10 mg Oral DAILY PRN    famotidine (PEPCID) tablet 20 mg  20 mg Oral QPM    folic acid (FOLVITE) tablet 1 mg  1 mg Oral DAILY    furosemide (LASIX) 100 mg in 0.9% sodium chloride 100 mL infusion  10 mg/hr IntraVENous CONTINUOUS    HYDROcodone-acetaminophen (NORCO) 5-325 mg per tablet 1 Tab  1 Tab Oral TID PRN    hydroxychloroquine (PLAQUENIL) tablet 300 mg  300 mg Oral DAILY    levETIRAcetam (KEPPRA) tablet 500 mg  500 mg Oral BID    levothyroxine (SYNTHROID) tablet 125 mcg  125 mcg Oral ACB    nitroglycerin (NITROSTAT) tablet 0.4 mg  0.4 mg SubLINGual Q5MIN PRN    pantoprazole (PROTONIX) tablet 40 mg  40 mg Oral ACB    polyvinyl alcohol (LIQUIFILM TEARS) 1.4 % ophthalmic solution 1 Drop  1 Drop Both Eyes PRN    predniSONE (DELTASONE) tablet 5 mg  5 mg Oral DAILY WITH BREAKFAST    sulfaSALAzine (AZULFIDINE) tablet 1,000 mg  1,000 mg Oral TID WITH MEALS    venlafaxine-SR (EFFEXOR-XR) capsule 225 mg  225 mg Oral DAILY WITH BREAKFAST    Warfarin on hold - Rx dosing   Other QPM       Review of Systems    Constitutional: negative for fever, chills, sweats  Cardiovascular: negative for chest pain, palpitations, syncope, edema  Gastrointestinal:  negative for dysphagia, reflux, vomiting, diarrhea, abdominal pain, or melena  Neurologic:  negative for focal weakness, numbness, headache    Objective:     Vitals:    06/18/19 0130 06/18/19 0447 06/18/19 0751 06/18/19 0857   BP: (!) 87/48 110/78  100/69   Pulse: 95 96  (!) 113   Resp: 15 16  18   Temp: 97.6 °F (36.4 °C) 97.3 °F (36.3 °C)  97.4 °F (36.3 °C)   SpO2: 97% 97% 91% 97%     Intake and Output:   06/16 1901 - 06/18 0700  In: -   Out: 950 [Urine:950]  No intake/output data recorded. Physical Exam:   Constitution:  the patient is well developed and in no acute distress  EENMT:  Sclera clear, pupils equal, oral mucosa moist  Respiratory: crackles  Cardiovascular:  RRR without M,G,R  Gastrointestinal: soft and non-tender; with positive bowel sounds. Musculoskeletal: warm without cyanosis. There is plus 1 lower extremity edema.   Skin:  no jaundice or rashes, no wounds   Neurologic: no gross neuro deficits     Psychiatric:  Awake     CXR:          Recent Labs     06/18/19  0616 06/17/19  1011 06/17/19  0607 06/16/19  1454 06/16/19  1453   WBC 6.4  --  7.7 9.0  --    HGB 11.7  --  11.6* 11.9  --    HCT 36.7 --  36.7 37.5  --      --  134* 158  --    INR 6.5* >6.8*  --   --  >6.8*     Recent Labs     06/18/19  0616 06/17/19  1628 06/17/19  0607 06/16/19  1454     --  136 134*   K 3.3*  --  3.0* 3.5   CL 99  --  102 99   CO2 27  --  24 28   *  --  89 144*   BUN 23  --  16 20   CREA 0.61  --  0.65 0.78   CA 8.8  --  8.2* 8.9   TROIQ  --  16.20* 14.50*  --    ALB  --   --   --  3.0*   TBILI  --   --   --  0.3   ALT  --   --   --  27   SGOT  --   --   --  33     Recent Labs     06/17/19  0133 06/16/19  1822   PHI 7.440 7.410   PCO2I 32.1* 35.7   PO2I 77 46*   HCO3I 21.8* 22.6         Assessment:  (Medical Decision Making)     Hospital Problems  Date Reviewed: 6/17/2019          Codes Class Noted POA    Acute systolic congestive heart failure (HCC) ICD-10-CM: I50.21  ICD-9-CM: 428.21, 428.0  6/17/2019 Yes        Elevated troponin ICD-10-CM: R74.8  ICD-9-CM: 790.6  6/17/2019 Yes        * (Principal) Acute respiratory failure with hypoxia (HCC) on optiflow , most likely related to cardiomyopathy, ICD-10-CM: J96.01  ICD-9-CM: 518.81  6/16/2019 Yes        Immunosuppressed status ([facility] ICD-10-CM: D89.9  ICD-9-CM: 279.9  6/20/2014 Yes        HTN (hypertension)--Dr. Gilbert Cabrera ICD-10-CM: I10  ICD-9-CM: 401.9  1/17/2013 Yes        GERD (gastroesophageal reflux disease) ICD-10-CM: K21.9  ICD-9-CM: 530.81  1/17/2013 Yes        Chronic pain (Chronic) ICD-10-CM: N85.48  ICD-9-CM: 338.29  2/4/2010 Yes        S/P AVR (aortic valve replacement) (Chronic) ICD-10-CM: Z95.2  ICD-9-CM: V43.3  2/4/2010 Yes              Plan:  (Medical Decision Making)     -- continue diuresis- improved  - normal WBC, procal -this does not appear primarily infectious right now  - wean optiflow  - follow up procal in AM, CXR    More than 50% of the time documented was spent in face-to-face contact with the patient and in the care of the patient on the floor/unit where the patient is located.     Adriana Costa MD

## 2019-06-18 NOTE — PROGRESS NOTES
Bed weight not functioning at this time. Patient unable to safely stand for weight at this time, lethargic and SOB.

## 2019-06-18 NOTE — PROGRESS NOTES
Problem: Mobility Impaired (Adult and Pediatric)  Goal: *Acute Goals and Plan of Care (Insert Text)  Description  LTG:  (1.)Ms. Mara Jeronimo will move from supine to sit and sit to supine , scoot up and down and roll side to side in bed with STAND BY ASSIST within 7 treatment day(s). (2.)Ms. Mara Jeronimo will transfer from bed to chair and chair to bed with CONTACT GUARD ASSIST using the least restrictive device within 7 treatment day(s). (3.)Ms. Mara Jeronimo will ambulate with CONTACT GUARD ASSIST for 75 feet with the least restrictive device within 7 treatment day(s). (4.)Ms. Mara Jeronimo will participate in therapeutic activity/exercises x 23 minutes for increased strength within 7 treatment days. (5.)Ms. Mara Jeronimo will maintain static/dynamic sitting x 12 minutes with SUPERVISION for improved balance within 7 treatment days. ________________________________________________________________________________________________     Outcome: Progressing Towards Goal     PHYSICAL THERAPY: Initial Assessment and AM 6/18/2019  INPATIENT: PT Visit Days : 1  [place] / [place] / Product Type: Medicare /       NAME/AGE/GENDER: Gregorio Hernandez is a 68 y.o. female   PRIMARY DIAGNOSIS: Elevated troponin [R74.8] Acute respiratory failure with hypoxia ([facility]   Acute respiratory failure with hypoxia ([facility]          ICD-10: Treatment Diagnosis:    Generalized Muscle Weakness (M62.81)  Difficulty in walking, Not elsewhere classified (R26.2)   Precaution/Allergies:  Latex; Bee sting [sting, bee]; Adhesive; and Diflucan [fluconazole]      ASSESSMENT:     Ms. Mara Jeronimo is a 68 y.o. female in the hospital for the above who was supine in bed upon arrival.  Per chart pt resides at [facility]. Pt presents confused and very lethargic. She was oriented to aline but unable to provide much other information due to drowsiness. Pt did report that she was ambulatory PTA with use of AD (unable to expand).   Pt on opti-flow and denied reports of pain. Ms. Armida Soliz presents to PT with decreased AROM and strength in B LEs. During evaluation pt performed bed mobility with min-modA and fair sitting balance. Pt's WOB appeared to increase as well as HR. Ms. Armida Soliz could benefit from skilled PT as she is currently functioning below her baseline. Pt received treatment of therapeutic activity. She was able to stand with RW/min-modA demonstrating poor standing balance. Pt transferred to chair with modA and noted to have decreased command following and attention as she became fatigued. Pt's HR noted to increase to 130s briefly. She then performed some seated TE. Post treatment SpO2 recorded at 91-92%. This section established at most recent assessment   PROBLEM LIST (Impairments causing functional limitations):  Decreased Strength  Decreased ADL/Functional Activities  Decreased Transfer Abilities  Decreased Ambulation Ability/Technique  Decreased Balance  Decreased Activity Tolerance  Increased Fatigue  Increased Shortness of Breath  Decreased Cognition   INTERVENTIONS PLANNED: (Benefits and precautions of physical therapy have been discussed with the patient.)  Balance Exercise  Bed Mobility  Family Education  Gait Training  Neuromuscular Re-education/Strengthening  Therapeutic Activites  Therapeutic Exercise/Strengthening  Transfer Training     TREATMENT PLAN: Frequency/Duration: 3 times a week for duration of hospital stay  Rehabilitation Potential For Stated Goals: [facility] (at time of discharge pending progress):    Placement: It is my opinion, based on this patient's performance to date, that Ms. Armida Soliz may benefit from intensive therapy at a [address] [place] [address] after discharge due to the functional deficits listed above that are likely to improve with skilled rehabilitation and concerns that he/she may be unsafe to be unsupervised at home due to decreased functional mobility and activity tolerance .  Equipment:   None at this time              HISTORY:   History of Present Injury/Illness (Reason for Referral):  See H&P  Past Medical History/Comorbidities:   Ms. Allie Winslow  has a past medical history of A-fib [facility]), Acquired hypothyroidism, Acute CVA (cerebrovascular accident) ([facility] (6/21/2014), Acute lacunar infarction [facility]), Anxiety state, unspecified, Aortic valve stenosis, congenital (2/19/2016), CAD (coronary artery disease), Cancer ([address] Candida Esophagitis (2/11/2010), Chronic kidney disease, Chronic pain, Depression, Duodenal ulcer, Dysphagia (2/19/2016), Embolism and thrombosis of unspecified artery ([address] (9/12/2014), Fibromyalgia, History of anemia (03/12/2012), History of drug overdose (09/2016), History of encephalopathy (02/04/2010), History of fracture of hip (03/10/2012), History of mastectomy, Hypertension, Immunosuppressed status ([address] (6/20/2014), Long term (current) use of anticoagulants, MCTD (mixed connective tissue disease) ([address] Nausea & vomiting, Osteoarthritis, Other ill-defined cerebrovascular disease, Pain in joint, pelvic region and thigh, Personal history of fall, Postmenopausal atrophic vaginitis, Primary localized osteoarthrosis, pelvic region and thigh, PUD (peptic ulcer disease) (2009), PVD (peripheral vascular disease) ([address] Reflux esophagitis, S/P AVR (aortic valve replacement), Seasonal allergic rhinitis due to pollen, Seizures ([address] Sleep disturbance, Symptomatic menopausal or female climacteric states, Thromboembolus ([address] (2007), Unspecified disorder of liver, and Urticaria. She also has no past medical history of Difficult intubation, Malignant hyperthermia due to anesthesia, or Pseudocholinesterase deficiency.   Ms. Allie Winslow  has a past surgical history that includes hx hysterectomy (1975); hx tonsillectomy; hx other surgical; hx carpal tunnel release (Right, 1995); pr abdomen surgery proc unlisted (1990); hx colonoscopy (9/25/13); hx colonoscopy (2010); hx cataract removal (Right, 3/20/2014); hx tumor removal (1986); hx mastectomy (Bilateral, 2000); hx tonsil and adenoidectomy; hx aortic valve replacement (2004); hx aortic valve replacement; hx cholecystectomy; pr total hip arthroplasty (Right, 12/2012, 3/11/12, 2/2013); pr revise total hip replacement (Right, 1/17/13); hx acl reconstruction (Left, 1979); and egd (3/29/2019). Social History/Living Environment:      Prior Level of Function/Work/Activity:  Not sure due to confusion and lethargy but she did state she was ambulatory PTA. Number of Personal Factors/Comorbidities that affect the Plan of Care: 3+: HIGH COMPLEXITY   EXAMINATION:   Most Recent Physical Functioning:   Gross Assessment:  AROM: Generally decreased, functional  Strength: Generally decreased, functional               Posture:  Posture (WDL): Exceptions to WDL  Posture Assessment: Rounded shoulders  Balance:  Sitting: Impaired  Sitting - Static: Fair (occasional)  Sitting - Dynamic: Poor (constant support)  Standing: Impaired  Standing - Static: Poor  Standing - Dynamic : Poor Bed Mobility:  Supine to Sit: Minimum assistance  Scooting: Moderate assistance  Wheelchair Mobility:     Transfers:  Sit to Stand: Minimum assistance; Moderate assistance  Stand to Sit: Minimum assistance  Bed to Chair: Moderate assistance  Gait:     Speed/Julia: Slow;Shuffled  Step Length: Left shortened;Right lengthened  Gait Abnormalities: Decreased step clearance;Trunk sway increased; Path deviations; Shuffling gait  Distance (ft): 2 Feet (ft)  Assistive Device: Walker, rolling  Ambulation - Level of Assistance:  Moderate assistance      Body Structures Involved:  Heart  Lungs  Muscles Body Functions Affected:  Cardio  Respiratory  Neuromusculoskeletal  Movement Related Activities and Participation Affected:  Learning and Applying Knowledge  General Tasks and Demands  Mobility  [address] [place] [address] Social and [place]   Number of elements that affect the Plan of Care: 4+: HIGH COMPLEXITY   CLINICAL PRESENTATION:   Presentation: Evolving clinical presentation with changing clinical characteristics: MODERATE COMPLEXITY   CLINICAL DECISION MA[facility]IN [facility] [address] [facility]? ?6 Clicks? Basic Mobility Inpatient Short Form  How much difficulty does the patient currently have. .. Unable A Lot A Little None   1. Turning over in bed (including adjusting bedclothes, sheets and blankets)? ? 1   ? 2   ? 3   ? 4   2. Sitting down on and standing up from a chair with arms ( e.g., wheelchair, bedside commode, etc.)   ? 1   ? 2   ? 3   ? 4   3. Moving from lying on back to sitting on the side of the bed?   ? 1   ? 2   ? 3   ? 4   How much help from another person does the patient currently need. .. Total A Lot A Little None   4. Moving to and from a bed to a chair (including a wheelchair)? ? 1   ? 2   ? 3   ? 4   5. Need to walk in hospital room? ? 1   ? 2   ? 3   ? 4   6. Climbing 3-5 steps with a railing? ? 1   ? 2   ? 3   ? 4   © , Trustees of [facility] [employer], under license to [employer]. All rights reserved      Score:  Initial: 15 Most Recent: X (Date: -- )    Interpretation of Tool:  Represents activities that are increasingly more difficult (i.e. Bed mobility, Transfers, Gait). Medical Necessity:     Patient demonstrates fair   rehab potential due to higher previous functional level. Reason for Services/Other Comments:  Patient continues to require skilled intervention due to decreased functional mobility, balance, and activity tolerance   .    Use of outcome tool(s) and clinical judgement create a POC that gives a: Questionable prediction of patient's progress: MODERATE COMPLEXITY            TREATMENT:   (In addition to Assessment/Re-Assessment sessions the following treatments were rendered)   Pre-treatment Symptoms/Complaints:  Lethargic  Pain: Initial:   Pain Intensity 1: 0  Post Session:  2 (Adult Nonverbal scale)     Therapeutic Activity: (    8 minutes): Therapeutic activities including Chair transfers, Ambulation on level ground and seated exercises  to improve mobility, strength, balance, coordination and activity tolerance . Required moderate   to promote static and dynamic balance in standing and promote coordination of bilateral, upper extremity(s), lower extremity(s). Date:  6/18/19 Date:   Date:     Activity/Exercise Parameters Parameters Parameters   APs 2 x 20 B     SAQ 1 x 15 B     Hip abd/add 1 x 10 B                                 Braces/Orthotics/Lines/Etc:   zapata catheter  O2 Device: Heated, Hi flow nasal cannula  Treatment/Session Assessment:    Response to Treatment:  Poorly given lethargy and decreased activity tolerance. Interdisciplinary Collaboration:   Physical Therapist  Registered Nurse  Certified Nursing Assistant/Patient Care Technician  After treatment position/precautions:   Up in chair  Bed alarm/tab alert on  Bed/Chair-wheels locked  Bed in low position  Call light within reach  RN notified   Compliance with Program/Exercises: Will assess as treatment progresses  Recommendations/Intent for next treatment session: \"Next visit will focus on advancements to more challenging activities and reduction in assistance provided\".   Total Treatment Duration:  PT Patient Time In/Time Out  Time In: 1032  Time Out: 5980 Leonard Anderson, PT, DPT

## 2019-06-18 NOTE — CONSULTS
Palliative Care    Patient: Kearney Primrose MRN: [id number]  SSN: [id number]    YOB: 1945  Age: 68 y.o. Sex: female       Date of Request: 6/18/2019  Date of Consult:  6/18/2019  Reason for Consult:  goals of care  Requesting Physician: Dr Enriqueta Cervantes     Assessment/Plan:     Principal Diagnosis:    Debility, Unspecified  R53.81    Additional Diagnoses:   · Altered Mental Status R41.82  · Failure to Thrive  R62.7  · Fatigue, Lethargy  R53.83  · Frailty  R54  · Encounter for Palliative Care  Z51.5    Palliative Performance Scale (PPS):  PPS: 40    Medical Decision Making:   Reviewed and summarized notes from admission to present   Discussed case with appropriate providersAbbey Harris CM  Reviewed laboratory and x-ray data from admission to present     Pt sitting in chair, no distress noted. No family at bedside. Pt is oriented to self and hospital, but unable to tell me why she is here. When I asked her if she had any family, she stated \"No.  Yes. No.\"  Chart review reveals she has a son, but her SW note, she is estranged from him. She has a HCPOA on file, naming her son, Catracho Yeh, as agent. She has friends, Raechel Spurling and Madhu Templeton, listed as contacts in the chart. Left message with Raechel Spurling, asking for return call. The HCPOA on file is from 2012. Unsure if there are more current documents listing someone else as HCPOA. Hopefully Raechel Spurling can provide insight as to who helps pt with decisions. Will continue to follow. Will discuss findings with members of the interdisciplinary team.      Thank you for this referral.          .    Subjective:     History obtained from:  Care Provider and Chart    Chief Complaint: CHF  History of Present Illness:  Ms Shy Randall is a 67 yo  female with PMH of CVA, CAD, CKD, HTN, HLD, PUD, PVD, and other conditions listed below, who presented to the ER from her nursing home on 6/16/2019 with c/o AMS and hypoxia.   She apparently was placed on amoxil for PNA, but continued to worsen. Work up revealed RLL PNA and hypoxia. Pt was treated with broad spectrum antibiotics, but her respiratory status continued to worsen. Repeat CXR was more consistent with pulmonary edema. Echo revealed new LV dysfunction. Her INR was elevated to 6.8. She was transferred to [facility] on 6/17, and cardiology evaluated her. They have recommended hospice support. Pt is currently confused. Advance Directive: Yes       Code Status:  DNR            Health Care Power of : Yes - Copy of [address] on file.     Past Medical History:   Diagnosis Date    A-fib 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     Candida Esophagitis 2/11/2010    Chronic kidney disease     Chronic pain     Depression     Duodenal ulcer     Dysphagia 2/19/2016    Embolism and thrombosis of unspecified artery ([address] 9/12/2014    Fibromyalgia     History of anemia 03/12/2012    GI blood loss    History of drug overdose 09/2016    narcotics    History of encephalopathy 02/04/2010    History of fracture of hip 03/10/2012    femoral neck    History of mastectomy     bilat    Hypertension     controlled with medication    Immunosuppressed status ([address] 6/20/2014    Long term (current) use of anticoagulants     MCTD (mixed connective tissue disease) ([address]     seeing Dr. Karlee Cheung, on methotrexate and Plaquenil    Nausea & vomiting     Osteoarthritis     Other ill-defined cerebrovascular disease     Pain in joint, pelvic region and thigh     Personal history of fall     Postmenopausal atrophic vaginitis     Primary localized osteoarthrosis, pelvic region and thigh     PUD (peptic ulcer disease) 2009    PVD (peripheral vascular disease) ([address]     Reflux esophagitis     S/P AVR (aortic valve replacement)     [place]    Seasonal allergic rhinitis due to pollen     Seizures ([address]     2 years ago     Sleep disturbance     Symptomatic menopausal or female climacteric states     Thromboembolus [facility]) 2007    right arm , right leg    Unspecified disorder of liver     Urticaria     Mild on back      Past Surgical History:   Procedure Laterality Date    ABDOMEN SURGERY [address]    exploratory tubes & ovaries removed    EGD  3/29/2019         HX ACL RECONSTRUCTION Left     HX AORTIC VALVE REPLACEMENT      [place] Aortic valve Valve    HX AORTIC VALVE REPLACEMENT      HX CARPAL TUNNEL RELEASE Right     HX CATARACT REMOVAL Right 3/20/2014    [employer]    HX CHOLECYSTECTOMY      HX COLONOSCOPY  13    Internal Hemorrhoids. Bx reveals Lymphocytic colitis. No further colonscopies will be scheduled due to concominant medical problems.  HX COLONOSCOPY      Internal hemorrhoids. Repeated , no further colonoscopies planned. [place].  HX HYSTERECTOMY  1975    HX MASTECTOMY Bilateral     Precancerous. Kenyatta Brood.  HX OTHER SURGICAL      right brachial  thrombosis    HX TONSIL AND ADENOIDECTOMY      HX TONSILLECTOMY      HX TUMOR REMOVAL  1986    Malignant tumor of colon.     REVISE TOTAL HIP REPLACEMENT Right 13    TOTAL HIP ARTHROPLASTY Right 2012, 3/11/12, 2013     Family History   Problem Relation Age of Onset    Stroke Mother     Heart Disease Father     Hypertension Father     Heart Disease Sister     Seizures Brother       Social History     Tobacco Use    Smoking status: Former Smoker     Packs/day: 0.25     Years: 10.00     Pack years: 2.50     Last attempt to quit: 3/23/1976     Years since quittin.2    Smokeless tobacco: Never Used    Tobacco comment: in    Substance Use Topics    Alcohol use: No     Alcohol/week: 0.0 oz     Prior to Admission medications Medication Sig Start Date End Date Taking? Authorizing Provider   polyvinyl alcohol (LIQUIFILM TEARS) 1.4 % ophthalmic solution Administer 1 Drop to both eyes as needed. Indications: dry eye    Provider, Historical   raNITIdine (ZANTAC) 150 mg tablet Take 150 mg by mouth nightly. Provider, Historical   folic acid (FOLVITE) 1 mg tablet Take 1 Tab by mouth daily. 4/11/19   Stephen Simmons MD   hydroxychloroquine (PLAQUENIL) 200 mg tablet Take 1.5 Tabs by mouth daily. 4/11/19   Stephen Simmons MD   methotrexate (RHEUMATREX) 2.5 mg tablet Take 8 tablets once a week. Must have labs done before refills can be given. 4/11/19   Stephen Simmons MD   amitriptyline (ELAVIL) 10 mg tablet Take  by mouth nightly. Provider, Historical   zoledronic acid/mannitol-water (RECLAST IV) by IntraVENous route Once a year. Last infusion was 10/16/18    Provider, Historical   carvedilol (COREG) 12.5 mg tablet Take  by mouth two (2) times daily (with meals). Provider, Historical   venlafaxine (EFFEXOR) 75 mg tablet Take 225 mg by mouth daily. Provider, Historical   fluticasone (FLONASE ALLERGY RELIEF) 50 mcg/actuation nasal spray 2 Sprays by Both Nostrils route daily as needed for Rhinitis. Provider, Historical   baclofen (LIORESAL) 20 mg tablet Take 1 Tab by mouth four (4) times daily for 30 days. Patient taking differently: Take 20 mg by mouth three (3) times daily. 10/23/18 6/16/19  Jacqui Garrison MD   hydrocodone/acetaminophen (NORCO PO) Take 5 mg by mouth three (3) times daily as needed for Pain. Provider, Historical   acetaminophen (TYLENOL EXTRA STRENGTH) 500 mg tablet Take 1,000 mg by mouth every eight (8) hours. Provider, Historical   levothyroxine (SYNTHROID) 125 mcg tablet Take  by mouth Daily (before breakfast). Provider, Historical   hydroCHLOROthiazide (HYDRODIURIL) 25 mg tablet Take 1 Tab by mouth daily.  5/9/18   Maribeth James DO   spironolactone (ALDACTONE) 25 mg tablet Take 1 Tab by mouth two (2) times a day. 5/9/18   Ladoris People, DO   cyanocobalamin 1,000 mcg tablet Take 1,000 mcg by mouth daily. Provider, Historical   diphenoxylate-atropine (LOMOTIL) 2.5-0.025 mg per tablet Take  by mouth four (4) times daily as needed for Diarrhea. Provider, Historical   nitroglycerin (NITROSTAT) 0.4 mg SL tablet 1 Tab by SubLINGual route every five (5) minutes as needed for Chest Pain. 11/10/17   Ladoris People, DO   cholecalciferol, vitamin D3, (VITAMIN D3) 2,000 unit tab Take  by mouth daily. Provider, Historical   dicyclomine (BENTYL) 10 mg capsule Take 1 Cap by mouth daily as needed. 8/28/17   Jerrod Terry PA   ondansetron hcl (ZOFRAN, AS HYDROCHLORIDE,) 4 mg tablet Take 1 Tab by mouth every eight (8) hours as needed for Nausea. 8/28/17   Jerrod Terry PA   pantoprazole (PROTONIX) 40 mg tablet Take 1 Tab by mouth daily. 7/3/17   Zafar Patterson MD   warfarin (COUMADIN) 2 mg tablet 2 mg Tues, Thurs, Sat and 4 mg all other days. Starting 7/4  Patient taking differently: 2 mg on Mondays and Fridays; 4 mg all other days  Indications: Blood Clot caused by Artificial Heart Valve 7/4/17   Zafar Patterson MD   levETIRAcetam (KEPPRA) 500 mg tablet Take 500 mg by mouth two (2) times a day. 4/22/17   Provider, Historical   traZODone (DESYREL) 100 mg tablet Take 2 hs prn sleep 5/23/17   Ugo Brownlee NP   sulfaSALAzine (AZULFIDINE) 500 mg tablet Take 1,000 mg by mouth three (3) times daily as needed. 10/6/15   Provider, Historical   predniSONE (DELTASONE) 5 mg tablet Take 5 mg by mouth daily.  2/5/10   Provider, Historical       Allergies   Allergen Reactions    Latex Anaphylaxis    Bee Sting [Sting, Bee] Shortness of Breath     anaphylatics    Adhesive Rash     Including Coban wrap    Diflucan [Fluconazole] Other (comments)     Increased INR per pt         Review of Systems:  Review of systems not obtained due to patient factors- AMS     Objective:     Visit Vitals  /68   Pulse (!) 110   Temp 97.7 °F (36.5 °C)   Resp 18   SpO2 90%        Physical Exam:    General:  Cooperative. Debilitated and frail. No acute distress. Eyes:  Conjunctivae/corneas clear    Nose: Nares normal. Septum midline.  Airvo   Neck: Supple, symmetrical, trachea midline   Lungs:   Crackles bilaterally, unlabored   Heart:  Regular rate and rhythm    Abdomen:   Soft, non-tender, non-distended   Extremities: Normal, atraumatic, no cyanosis or edema   Skin: Skin color, texture, turgor normal.    Neurologic: Nonfocal   Psych: Alert and oriented to person and place      Assessment:     Hospital Problems  Date Reviewed: 6/17/2019          Codes Class Noted POA    Acute systolic congestive heart failure ([facility] ICD-10-CM: I50.21  ICD-9-CM: 428.21, 428.0  6/17/2019 Yes        Elevated troponin ICD-10-CM: R74.8  ICD-9-CM: 790.6  6/17/2019 Yes        * (Principal) Acute respiratory failure with hypoxia (HCC) ICD-10-CM: J96.01  ICD-9-CM: 518.81  6/16/2019 Yes        Immunosuppressed status ([facility] ICD-10-CM: D89.9  ICD-9-CM: 279.9  6/20/2014 Yes        HTN (hypertension)--Dr. Daphne Rae ICD-10-CM: I10  ICD-9-CM: 401.9  1/17/2013 Yes        GERD (gastroesophageal reflux disease) ICD-10-CM: K21.9  ICD-9-CM: 530.81  1/17/2013 Yes        Chronic pain (Chronic) ICD-10-CM: C12.37  ICD-9-CM: 338.29  2/4/2010 Yes        S/P AVR (aortic valve replacement) (Chronic) ICD-10-CM: Z95.2  ICD-9-CM: V43.3  2/4/2010 Yes              Signed By: Juan Zapata NP     June 18, 2019

## 2019-06-18 NOTE — PROGRESS NOTES
Dr. Devon Aguilar notified of lasix gtt still on hold and BP continues to be in 39A systolic. No new order received.

## 2019-06-18 NOTE — PROGRESS NOTES
This note will not be viewable in [address] RINKU outreach postponed for 7 days due to discharge to [address] [place] [address].

## 2019-06-18 NOTE — PROGRESS NOTES
Progress Note    Patient: Julee Braun MRN: [id number]  SSN: [id number]    YOB: 1945  Age: 68 y.o. Sex: female      Admit Date: 6/17/2019    LOS: 1 day     Subjective:     Patient was seen at bedside. Alert. Still short of breath. Decreased breath sounds in the R lung base. Will get a chest x ray today. Not on antibiotics. Not on lasix on steroids. Objective:     Vitals:    06/18/19 1213 06/18/19 1438 06/18/19 1443 06/18/19 1636   BP: 106/68   101/65   Pulse: (!) 110   98   Resp: 18   16   Temp: 97.7 °F (36.5 °C)   97.6 °F (36.4 °C)   SpO2: 90% 96%  96%   Weight:   68.2 kg (150 lb 6.4 oz)         Intake and Output:  Current Shift: No intake/output data recorded. Last three shifts: 06/16 1901 - 06/18 0700  In: -   Out: 950 [Urine:950]    Physical Exam:   GENERAL: alert, appears stated age  EYE: conjunctivae/corneas clear. LYMPHATIC: Cervical, supraclavicular, and axillary nodes normal.   THROAT & NECK: normal and no erythema or exudates noted. LUNG: decreased breath sound in the R lung base   HEART: regular rate and rhythm, S1, S2 normal, no murmur, click, rub or gallop  ABDOMEN: soft, non-tender. Bowel sounds normal. No masses,  no organomegaly  EXTREMITIES:  extremities normal, atraumatic, no cyanosis or edema  SKIN: Normal.  NEUROLOGIC: No focal deficits   PSYCHIATRIC: non focal    Lab/Data Review: All lab results for the last 24 hours reviewed.          Assessment:     Principal Problem:    Acute respiratory failure with hypoxia ([facility] [address] (6/16/2019)    Active Problems:    Chronic pain (2/4/2010)      S/P AVR (aortic valve replacement) (2/4/2010)      HTN (hypertension)--Dr. Oren Nice (1/17/2013)      GERD (gastroesophageal reflux disease) (1/17/2013)      Immunosuppressed status ([address] (6/20/2014)      Acute systolic congestive heart failure ([facility] [address] (6/17/2019)      Elevated troponin (6/17/2019)        Plan:     - continue immunosuppressive drugs   -chest x ray today -holding lasix   -not on antibiotics   -monitor clinical progress     Signed By: Troy Hernández MD     June 18, 2019

## 2019-06-18 NOTE — PROGRESS NOTES
Warfarin dosing per pharmacist    Tereza Joe is a 68 y.o. female. Weight: (unable to obtain. RN notified. )    Indication:  AVR    Goal INR:  2-3 per anticoag notes    Home dose:  2 mg M, F; 4 mg all other days    Risk factors or significant drug interactions:  none    Other anticoagulants:  none    Daily Monitoring  Date  INR     Warfarin dose HGB              Notes  6/16  >6.8  Hold  11.9  6/17  >6.8  Hold  11.6   6/18  6.5  Hold  ---      Pharmacy consulted to manage warfarin for Ms. Moffett Dean during this admission. She presented with supratherapeutic INR and warfarin held. INR trending down slightly, but still supratherapeutic at 6.5 today. Continue to hold warfarin until INR trends down toward therapeutic range. Daily INR. Pharmacy will continue to follow. Please call with any questions.     Thank you,  Juliet Heck, PharmD  Clinical Pharmacist  [phone]

## 2019-06-18 NOTE — PROGRESS NOTES
am  6/18/2019 6:48 AM    Admit Date: 6/17/2019    Admit Diagnosis: Elevated troponin [R74.8]      Subjective:    Patient in bed, confused but responds to voice. On Airvo with sats high 90's. No complaints of chest pain. Appears frail. BPs remain low in 80's/40's. INR >6.8 yesterday. Coumadin on hold; mechanical valve. Crackles throughout right lobes.      Objective:      Visit Vitals  /78 (BP 1 Location: Left arm, BP Patient Position: At rest)   Pulse 96   Temp 97.3 °F (36.3 °C)   Resp 16   SpO2 97%       ROS:  General ROS: negative for - chills  Hematological and Lymphatic ROS: negative for - blood clots or jaundice  Respiratory ROS: positive for - cough, shortness of breath and crackles  Cardiovascular ROS: positive for - dyspnea on exertion  negative for - chest pain  Gastrointestinal ROS: no abdominal pain, change in bowel habits, or black or bloody stools  Neurological ROS: no TIA or stroke symptoms    Physical Exam:    Physical Examination: General appearance - alert, well appearing, and in no distress  Mental status - alert, oriented to person, place, and time  Eyes - pupils equal and reactive, extraocular eye movements intact  Neck/lymph - supple, no significant adenopathy  Chest/CV - crackles in right lobes  Heart - normal rate, regular rhythm, normal S1, S2, no murmurs, rubs, clicks or gallops  Abdomen/GI - soft, nontender, nondistended, no masses or organomegaly  Musculoskeletal - no joint tenderness, deformity or swelling  Extremities - peripheral pulses normal, no pedal edema, no clubbing or cyanosis  Skin - normal coloration and turgor, no rashes, no suspicious skin lesions noted    Current Facility-Administered Medications   Medication Dose Route Frequency    sodium chloride (NS) flush 5-40 mL  5-40 mL IntraVENous Q8H    sodium chloride (NS) flush 5-40 mL  5-40 mL IntraVENous PRN    acetaminophen (TYLENOL) tablet 1,000 mg  1,000 mg Oral Q8H    albuterol (PROVENTIL VENTOLIN) nebulizer solution 2.5 mg  2.5 mg Nebulization Q6H PRN    dicyclomine (BENTYL) capsule 10 mg  10 mg Oral DAILY PRN    famotidine (PEPCID) tablet 20 mg  20 mg Oral QPM    folic acid (FOLVITE) tablet 1 mg  1 mg Oral DAILY    furosemide (LASIX) 100 mg in 0.9% sodium chloride 100 mL infusion  10 mg/hr IntraVENous CONTINUOUS    HYDROcodone-acetaminophen (NORCO) 5-325 mg per tablet 1 Tab  1 Tab Oral TID PRN    hydroxychloroquine (PLAQUENIL) tablet 300 mg  300 mg Oral DAILY    levETIRAcetam (KEPPRA) tablet 500 mg  500 mg Oral BID    levothyroxine (SYNTHROID) tablet 125 mcg  125 mcg Oral ACB    nitroglycerin (NITROSTAT) tablet 0.4 mg  0.4 mg SubLINGual Q5MIN PRN    pantoprazole (PROTONIX) tablet 40 mg  40 mg Oral ACB    polyvinyl alcohol (LIQUIFILM TEARS) 1.4 % ophthalmic solution 1 Drop  1 Drop Both Eyes PRN    predniSONE (DELTASONE) tablet 5 mg  5 mg Oral DAILY WITH BREAKFAST    sulfaSALAzine (AZULFIDINE) tablet 1,000 mg  1,000 mg Oral TID WITH MEALS    venlafaxine-SR (EFFEXOR-XR) capsule 225 mg  225 mg Oral DAILY WITH BREAKFAST    Warfarin on hold - Rx dosing   Other QPM       Data Review:   @LABRCNT(Na,K,BUN,CREA,WBC,HGB,HCT,PLT,INR,TRP,TCHOL*,Triglyceride*,LDL*,LDLCPOC HDL*,HDL])@    TELEMETRY: SR/ST    Assessment/Plan:     Principal Problem:    Acute respiratory failure with hypoxia (HCC) (6/16/2019)- sats improved on Airvo    Active Problems:    Chronic pain (2/4/2010)      S/P AVR (aortic valve replacement) (2/4/2010)- coumadin on hold INR supratherapeutic; today INR pending      HTN (hypertension)--Dr. Se Marlow (1/17/2013)- been hypotensive; will not tolerate BB and ACE       GERD (gastroesophageal reflux disease) (1/17/2013)      Immunosuppressed status ([facility] (6/20/2014)- failed therapy for PNA recently; on several immunosuppressive agents; hospitalist following      Acute systolic congestive heart failure ([facility] (6/17/2019)- patient does not appear fluid overloaded.  Caution advised with diuretics due to decompensation of stress induced cardiomyopathy.        Elevated troponin (6/17/2019)- takotsubo cardiomyopathy; no plan for invasive ischemic work up.      need palliative care and hospice for multiple comorbidities    Lavinia Corley MD        [address], RN   [address] Student

## 2019-06-18 NOTE — PROGRESS NOTES
Problem: Dysphagia (Adult)  Goal: *Acute Goals and Plan of Care (Insert Text)  Description  LTG: Patient will tolerate least restrictive diet without overt signs or symptoms of airway compromise. STG: Patient will tolerate puree diet and thin liquids via cup without overt signs or symptoms of airway compromise. STG: Patient will participate in modified barium swallow study as clinically indicated. Outcome: Progressing Towards Goal    SPEECH LANGUAGE PATHOLOGY: BEDSIDE SWALLOW NOTE: Daily Note 1    NAME/AGE/GENDER: Antonia Echeverria is a 68 y.o. female  DATE: 6/18/2019  PRIMARY DIAGNOSIS: Elevated troponin [R74.8]      ICD-10: Treatment Diagnosis: R13.12 Oropharyngeal dysphagia  INTERDISCIPLINARY COLLABORATION: Registered Nurse  PRECAUTIONS/ALLERGIES: Latex; Bee sting [sting, bee]; Adhesive; and Diflucan [fluconazole]   ASSESSMENT:   Patient seen for diet tolerance with puree/thin liquids and po trials for potential diet advancement. Per RN report, patient is not fond of puree textures and has required encouragement to eat. She was seen sitting upright in bedside chair following PT session. She was drowsy and had delayed responses to all verbal questions. Strong cough on first sip of thin via cup, but no s/sx airway compromise on remaining 8 trials. Puree tolerated without difficulty. Labored mastication and reports of fatigue with single bite of mechanical soft. Increased shortness of breath and rest break during mastication of 2 pieces of pear. Good awareness of fatigue with chewables as she stated \"this is just too hard\". Recommend continue puree diet/thin liquids by cup only. Speech to continue following for diet tolerance and potential diet upgrade as endurance improves. ?????? ? ? This section established at most recent assessment??????????  PROBLEM LIST (Impairments causing functional limitations):  1.  Oropharyngeal dysphagia  REHABILITATION POTENTIAL FOR STATED GOALS: Fair  PLAN OF CARE: Patient will benefit from skilled intervention to address the following impairments. RECOMMENDATIONS AND PLANNED INTERVENTIONS (Benefits and precautions of therapy have been discussed with the patient.):  · PO:  Pureed  · Liquids:  regular thin  MEDICATIONS:  · With liquid  ASPIRATION PRECAUTIONS:  · Slow rate of intake  · Small bites/sips  · Upright at 90 degrees during meal  COMPENSATORY STRATEGIES/MODIFICATIONS INCLUDING:  · None  OTHER RECOMMENDATIONS (including follow up treatment recommendations): · Family training/education  · Patient education  RECOMMENDED DIET MODIFICATIONS DISCUSSED WITH:  · Nursing  · Patient  FREQUENCY/DURATION: Continue to follow patient 3 times a week for duration of hospital stay to address above goals. RECOMMENDED REHABILITATION/EQUIPMENT: (at time of discharge pending progress): Due to the probability of continued deficits (see above) this patient will likely need continued skilled speech therapy after discharge. SUBJECTIVE:   Drowsy/fatiuged after PT session. Requesting to get back in bed.    History of Present Injury/Illness: Ms. Veta Dandy  has a past medical history of A-fib [facility]), Acquired hypothyroidism, Acute CVA (cerebrovascular accident) ([facility] [address] (6/21/2014), Acute lacunar infarction [facility]), Anxiety state, unspecified, Aortic valve stenosis, congenital (2/19/2016), CAD (coronary artery disease), Cancer ([address] Candida Esophagitis (2/11/2010), Chronic kidney disease, Chronic pain, Depression, Duodenal ulcer, Dysphagia (2/19/2016), Embolism and thrombosis of unspecified artery ([address] (9/12/2014), Fibromyalgia, History of anemia (03/12/2012), History of drug overdose (09/2016), History of encephalopathy (02/04/2010), History of fracture of hip (03/10/2012), History of mastectomy, Hypertension, Immunosuppressed status ([address] (6/20/2014), Long term (current) use of anticoagulants, MCTD (mixed connective tissue disease) ([address] Nausea & vomiting, Osteoarthritis, Other ill-defined cerebrovascular disease, Pain in joint, pelvic region and thigh, Personal history of fall, Postmenopausal atrophic vaginitis, Primary localized osteoarthrosis, pelvic region and thigh, PUD (peptic ulcer disease) (), PVD (peripheral vascular disease) ([facility] [address] Reflux esophagitis, S/P AVR (aortic valve replacement), Seasonal allergic rhinitis due to pollen, Seizures ([facility] [address] Sleep disturbance, Symptomatic menopausal or female climacteric states, Thromboembolus ([facility] (), Unspecified disorder of liver, and Urticaria. She also has no past medical history of Difficult intubation, Malignant hyperthermia due to anesthesia, or Pseudocholinesterase deficiency. .  She also  has a past surgical history that includes hx hysterectomy (); hx tonsillectomy; hx other surgical; hx carpal tunnel release (Right, ); pr abdomen surgery proc unlisted (); hx colonoscopy (13); hx colonoscopy (); hx cataract removal (Right, 3/20/2014); hx tumor removal (); hx mastectomy (Bilateral, ); hx tonsil and adenoidectomy; hx aortic valve replacement (); hx aortic valve replacement; hx cholecystectomy; pr total hip arthroplasty (Right, 2012, 3/11/12, 2013); pr revise total hip replacement (Right, 13); hx acl reconstruction (Left, ); and egd (3/29/2019).    Present Symptoms:    Pain Scale 1: Numeric (0 - 10)  Pain Intensity 1: 0  Pain Location 1: Back  Pain Intervention(s) 1: Medication (see MAR)  Current Medications:   Current Facility-Administered Medications on File Prior to Encounter   Medication Dose Route Frequency Provider Last Rate Last Dose    [COMPLETED] potassium chloride 20 mEq in 100 ml IVPB  20 mEq IntraVENous Q2H Dyllan Elias MD 50 mL/hr at 19 1140 20 mEq at 19 1140    [] perflutren lipid microspheres (DEFINITY) in NS bolus IV  1 mL IntraVENous PRN Dyllan Elias MD   1 mL at 19 0900    [DISCONTINUED] WARFARIN (COUMADIN) - Pharmacy to Dose   Other QPM Ressie Fulling., MD        [DISCONTINUED] furosemide (LASIX) 100 mg in 0.9% sodium chloride 100 mL infusion  10 mg/hr IntraVENous CONTINUOUS Ressie Fulling., MD 10 mL/hr at 06/17/19 1214 10 mg/hr at 06/17/19 1214    [DISCONTINUED] acetaminophen (TYLENOL) tablet 1,000 mg  1,000 mg Oral Q8H Ressie Fulling., MD   1,000 mg at 06/17/19 1343    [DISCONTINUED] dicyclomine (BENTYL) capsule 10 mg  10 mg Oral DAILY PRN Ressie Fulling., MD        [DISCONTINUED] folic acid (FOLVITE) tablet 1 mg  1 mg Oral DAILY Ressie Fulling., MD   1 mg at 06/17/19 0820    [DISCONTINUED] HYDROcodone-acetaminophen ([address] [place] [address]) 5-325 mg per tablet 1 Tab  1 Tab Oral TID PRN Ressie Fulling., MD        [DISCONTINUED] hydroxychloroquine (PLAQUENIL) tablet 300 mg  300 mg Oral DAILY Ressie Fulling., MD   300 mg at 06/17/19 5037    [DISCONTINUED] levETIRAcetam (KEPPRA) tablet 500 mg  500 mg Oral BID Ressie Fulling., MD   500 mg at 06/17/19 0372    [DISCONTINUED] levothyroxine (SYNTHROID) tablet 125 mcg  125 mcg Oral ACB Ressie Fulling., MD   125 mcg at 06/17/19 0820    [DISCONTINUED] nitroglycerin (NITROSTAT) tablet 0.4 mg  0.4 mg SubLINGual Q5MIN PRN Ressie Fulling., MD        [DISCONTINUED] pantoprazole (PROTONIX) tablet 40 mg  40 mg Oral ACB Ressie Fulling., MD   40 mg at 06/17/19 0820    [DISCONTINUED] polyvinyl alcohol (LIQUIFILM TEARS) 1.4 % ophthalmic solution 1 Drop  1 Drop Both Eyes PRN Ressie Fulling., MD        [DISCONTINUED] predniSONE (DELTASONE) tablet 5 mg  5 mg Oral DAILY WITH BREAKFAST Ressie Fulling., MD   5 mg at 06/17/19 3816    [DISCONTINUED] famotidine (PEPCID) tablet 20 mg  20 mg Oral QPM Ressie Fulling., MD   20 mg at 06/16/19 2219    [DISCONTINUED] sulfaSALAzine (AZULFIDINE) tablet 1,000 mg  1,000 mg Oral TID WITH MEALS Junito Jones MD   1,000 mg at 06/17/19 0831    [DISCONTINUED] sodium chloride (NS) flush 5-40 mL  5-40 mL IntraVENous Q8H Junito Jones MD   5 mL at 06/17/19 9702    [DISCONTINUED] sodium chloride (NS) flush 5-40 mL  5-40 mL IntraVENous PRN Jocelyn Cedeno MD        [COMPLETED] tuberculin injection 5 Units  5 Units IntraDERMal ONCE Jocelyn Cedeno MD   5 Units at 06/17/19 0510    [DISCONTINUED] venlafaxine-SR (EFFEXOR-XR) capsule 225 mg  225 mg Oral DAILY WITH BREAKFAST Jocelyn Cedeno MD   225 mg at 06/17/19 0831    [DISCONTINUED] albuterol (PROVENTIL VENTOLIN) nebulizer solution 2.5 mg  2.5 mg Nebulization Q6H PRN Jocelyn Cedeno MD         Current Outpatient Medications on File Prior to Encounter   Medication Sig Dispense Refill    polyvinyl alcohol (LIQUIFILM TEARS) 1.4 % ophthalmic solution Administer 1 Drop to both eyes as needed. Indications: dry eye      raNITIdine (ZANTAC) 150 mg tablet Take 150 mg by mouth nightly.  folic acid (FOLVITE) 1 mg tablet Take 1 Tab by mouth daily. 30 Tab 11    hydroxychloroquine (PLAQUENIL) 200 mg tablet Take 1.5 Tabs by mouth daily. 60 Tab 3    methotrexate (RHEUMATREX) 2.5 mg tablet Take 8 tablets once a week. Must have labs done before refills can be given. 32 Tab 3    amitriptyline (ELAVIL) 10 mg tablet Take  by mouth nightly.  zoledronic acid/mannitol-water (RECLAST IV) by IntraVENous route Once a year. Last infusion was 10/16/18      carvedilol (COREG) 12.5 mg tablet Take  by mouth two (2) times daily (with meals).  venlafaxine (EFFEXOR) 75 mg tablet Take 225 mg by mouth daily.  fluticasone (FLONASE ALLERGY RELIEF) 50 mcg/actuation nasal spray 2 Sprays by Both Nostrils route daily as needed for Rhinitis.  baclofen (LIORESAL) 20 mg tablet Take 1 Tab by mouth four (4) times daily for 30 days. (Patient taking differently: Take 20 mg by mouth three (3) times daily.) 120 Tab 12    hydrocodone/acetaminophen (NORCO PO) Take 5 mg by mouth three (3) times daily as needed for Pain.  acetaminophen (TYLENOL EXTRA STRENGTH) 500 mg tablet Take 1,000 mg by mouth every eight (8) hours.       levothyroxine (SYNTHROID) 125 mcg tablet Take  by mouth Daily (before breakfast).  hydroCHLOROthiazide (HYDRODIURIL) 25 mg tablet Take 1 Tab by mouth daily. 90 Tab 3    spironolactone (ALDACTONE) 25 mg tablet Take 1 Tab by mouth two (2) times a day. 180 Tab 3    cyanocobalamin 1,000 mcg tablet Take 1,000 mcg by mouth daily.  diphenoxylate-atropine (LOMOTIL) 2.5-0.025 mg per tablet Take  by mouth four (4) times daily as needed for Diarrhea.  nitroglycerin (NITROSTAT) 0.4 mg SL tablet 1 Tab by SubLINGual route every five (5) minutes as needed for Chest Pain. 25 Tab 5    cholecalciferol, vitamin D3, (VITAMIN D3) 2,000 unit tab Take  by mouth daily.  dicyclomine (BENTYL) 10 mg capsule Take 1 Cap by mouth daily as needed. 30 Cap 0    ondansetron hcl (ZOFRAN, AS HYDROCHLORIDE,) 4 mg tablet Take 1 Tab by mouth every eight (8) hours as needed for Nausea. 60 Tab 0    pantoprazole (PROTONIX) 40 mg tablet Take 1 Tab by mouth daily. 30 Tab 0    warfarin (COUMADIN) 2 mg tablet 2 mg Tues, Thurs, Sat and 4 mg all other days. Starting 7/4 (Patient taking differently: 2 mg on Mondays and Fridays; 4 mg all other days  Indications: Blood Clot caused by Artificial Heart Valve) 30 Tab 0    levETIRAcetam (KEPPRA) 500 mg tablet Take 500 mg by mouth two (2) times a day.  traZODone (DESYREL) 100 mg tablet Take 2 hs prn sleep 60 Tab 5    sulfaSALAzine (AZULFIDINE) 500 mg tablet Take 1,000 mg by mouth three (3) times daily as needed.  predniSONE (DELTASONE) 5 mg tablet Take 5 mg by mouth daily. Current Dietary Status:     NPO  History of reflux:  YES    Reflux medication: Pantoprazole  Social History/Home Situation:       OBJECTIVE:   Respiratory Status:  Heated; Hi flow nasal cannula  45 l/min  CXR Results:bibasilar opacities likely atelectasis or penumonia  MRI/CT Results: NO acute abnormality.    Oral Motor Structure/Speech:  Oral-Motor Structure/Motor Speech  Labial: No impairment  Dentition: Upper & lower dentures  Oral Hygiene: Adequate  Lingual: No impairment    Cognitive and Communication Status:  Neurologic State: Drowsy  Orientation Level: Oriented to person;Disoriented to place  Cognition: Decreased attention/concentration  Perception: Appears intact  Perseveration: No perseveration noted  Safety/Judgement: Fall prevention    BEDSIDE SWALLOW EVALUATION  Oral Assessment:  Oral Assessment  Labial: No impairment  Dentition: Upper & lower dentures  Oral Hygiene: Adequate  Lingual: No impairment  P.O. Trials:  Patient Position: Up in bedside chair    The patient was given the following:   Consistency Presented: Puree;Mechanical soft; Thin liquid  How Presented: Self-fed/presented;SLP-fed/presented;Cup/sip;Spoon    ORAL PHASE:  Bolus Acceptance: No impairment  Bolus Formation/Control: Impaired  Propulsion: Delayed (# of seconds)  Type of Impairment: Mastication  Oral Residue: None    PHARYNGEAL PHASE:  Initiation of Swallow: Delayed (# of seconds)  Laryngeal Elevation: Functional  Aspiration Signs/Symptoms: Strong cough  Vocal Quality: Low volume           Pharyngeal Phase Characteristics: Poor endurance;Easily fatigued     OTHER OBSERVATIONS:  Rate/bite size: Questionable   Endurance:  Impaired   Comments: Tool Used: Dysphagia Outcome and Severity Scale (MARÍA)    Score Comments   Normal Diet  ? 7 With no strategies or extra time needed   Functional Swallow  ? 6 May have mild oral or pharyngeal delay       Mild Dysphagia    ? 5 Which may require one diet consistency restricted (those who demonstrate penetration which is entirely cleared on MBS would be included)   Mild-Moderate Dysphagia  ? 4 With 1-2 diet consistencies restricted       Moderate Dysphagia  ? 3 With 2 or more diet consistencies restricted       Moderately Severe Dysphagia  ? 2 With partial PO strategies (trials with ST only)       Severe Dysphagia  ?  1 With inability to tolerate any PO safely          Score:  Initial: 4 Most Recent: X (Date: --)   Interpretation of Tool: The Dysphagia Outcome and Severity Scale (MARÍA) is a simple, easy-to-use, 7-point scale developed to systematically rate the functional severity of dysphagia based on objective assessment and make recommendations for diet level, independence level, and type of nutrition. [place] / [place] / Product Type: Medicare /     TREATMENT:    (In addition to Assessment/Re-Assessment sessions the following treatments were rendered)  Assessment/Reassessment only, no treatment provided today  MODALITIES:                                                                    ORAL MOTOR  EXERCISES:                                                                                                                                                                      LARYNGEAL / PHARYNGEAL EXERCISES:                                                                                                                                     __________________________________________________________________________________________________  Safety:   After treatment position/precautions:  · Call light within reach  · RN notified  · Upright in Bed  Treatment Assessment:  oropharyngeal dysphagia. Progression/Medical Necessity:   · Skilled intervention continues to be required due to persistent signs and symptoms of aspiration and patient still consuming a modified diet  · . Compliance with Program/Exercises: Will assess as treatment progresses  Reason for Continuation of Services/Other Comments:  · Patient continues to require skilled intervention due to medical complications  · . Recommendations/Intent for next treatment session: \"Treatment next visit will focus on diet tolerance and potential diet advancement \".     Total Treatment Duration:  Time In: 1127  Time Out: [address] [facility] [address] CCC-SLP

## 2019-06-18 NOTE — PROGRESS NOTES
Care Management Interventions  Mode of Transport at Discharge: S  Transition of Care Consult (CM Consult): Discharge Planning, Home Hospice(vs Hospice House)  Discharge Durable Medical Equipment: No  Physical Therapy Consult: Yes  Occupational Therapy Consult: Yes  Current Support Network: Assisted Living  Plan discussed with Pt/Family/Caregiver: No  Discharge Location  Discharge Placement: Home with hospice    CM unable to reach friend Nataly. CM spoke with admin at [address] [place] [address]. Pt has no other family. Pt was alert & oriented until hospital admission. Pt is listed as her own POA. AL does have a  Angelika Boyd listed, unsure of relationship, likely daughter in law, from note in 2016- [phone]. AL would be able to provide hospice care at facility. CM to continue to follow for dc needs.

## 2019-06-19 ENCOUNTER — APPOINTMENT (OUTPATIENT)
Dept: GENERAL RADIOLOGY | Age: 74
DRG: 291 | End: 2019-06-19
Attending: INTERNAL MEDICINE
Payer: MEDICARE

## 2019-06-19 LAB
ANION GAP SERPL CALC-SCNC: 10 MMOL/L (ref 7–16)
ATRIAL RATE: 115 BPM
BASOPHILS # BLD: 0 K/UL (ref 0–0.2)
BASOPHILS NFR BLD: 0 % (ref 0–2)
BUN SERPL-MCNC: 25 MG/DL (ref 8–23)
CALCIUM SERPL-MCNC: 9.2 MG/DL (ref 8.3–10.4)
CALCULATED P AXIS, ECG09: 49 DEGREES
CALCULATED R AXIS, ECG10: -2 DEGREES
CALCULATED T AXIS, ECG11: 166 DEGREES
CHLORIDE SERPL-SCNC: 101 MMOL/L (ref 98–107)
CO2 SERPL-SCNC: 27 MMOL/L (ref 21–32)
CREAT SERPL-MCNC: 0.52 MG/DL (ref 0.6–1)
DIAGNOSIS, 93000: NORMAL
DIFFERENTIAL METHOD BLD: ABNORMAL
EOSINOPHIL # BLD: 0 K/UL (ref 0–0.8)
EOSINOPHIL NFR BLD: 0 % (ref 0.5–7.8)
ERYTHROCYTE [DISTWIDTH] IN BLOOD BY AUTOMATED COUNT: 15.9 % (ref 11.9–14.6)
GLUCOSE SERPL-MCNC: 102 MG/DL (ref 65–100)
HCT VFR BLD AUTO: 36.5 % (ref 35.8–46.3)
HGB BLD-MCNC: 11.6 G/DL (ref 11.7–15.4)
IMM GRANULOCYTES # BLD AUTO: 0.1 K/UL (ref 0–0.5)
IMM GRANULOCYTES NFR BLD AUTO: 1 % (ref 0–5)
INR PPP: 5.5
LYMPHOCYTES # BLD: 0.9 K/UL (ref 0.5–4.6)
LYMPHOCYTES NFR BLD: 13 % (ref 13–44)
MCH RBC QN AUTO: 30.4 PG (ref 26.1–32.9)
MCHC RBC AUTO-ENTMCNC: 31.8 G/DL (ref 31.4–35)
MCV RBC AUTO: 95.5 FL (ref 79.6–97.8)
MM INDURATION POC: NORMAL MM (ref 0–5)
MONOCYTES # BLD: 0.6 K/UL (ref 0.1–1.3)
MONOCYTES NFR BLD: 8 % (ref 4–12)
NEUTS SEG # BLD: 5.5 K/UL (ref 1.7–8.2)
NEUTS SEG NFR BLD: 78 % (ref 43–78)
NRBC # BLD: 0 K/UL (ref 0–0.2)
P-R INTERVAL, ECG05: 142 MS
PLATELET # BLD AUTO: 161 K/UL (ref 150–450)
PMV BLD AUTO: 10 FL (ref 9.4–12.3)
POTASSIUM SERPL-SCNC: 3.5 MMOL/L (ref 3.5–5.1)
PPD POC: NEGATIVE NEGATIVE
PROCALCITONIN SERPL-MCNC: 1.1 NG/ML
PROTHROMBIN TIME: 49.1 SEC (ref 11.7–14.5)
Q-T INTERVAL, ECG07: 366 MS
QRS DURATION, ECG06: 136 MS
QTC CALCULATION (BEZET), ECG08: 506 MS
RBC # BLD AUTO: 3.82 M/UL (ref 4.05–5.2)
SODIUM SERPL-SCNC: 138 MMOL/L (ref 136–145)
VENTRICULAR RATE, ECG03: 115 BPM
WBC # BLD AUTO: 7 K/UL (ref 4.3–11.1)

## 2019-06-19 PROCEDURE — 74011250637 HC RX REV CODE- 250/637: Performed by: INTERNAL MEDICINE

## 2019-06-19 PROCEDURE — 99231 SBSQ HOSP IP/OBS SF/LOW 25: CPT | Performed by: NURSE PRACTITIONER

## 2019-06-19 PROCEDURE — 71045 X-RAY EXAM CHEST 1 VIEW: CPT

## 2019-06-19 PROCEDURE — 80048 BASIC METABOLIC PNL TOTAL CA: CPT

## 2019-06-19 PROCEDURE — 36415 COLL VENOUS BLD VENIPUNCTURE: CPT

## 2019-06-19 PROCEDURE — 74011636637 HC RX REV CODE- 636/637: Performed by: INTERNAL MEDICINE

## 2019-06-19 PROCEDURE — 92526 ORAL FUNCTION THERAPY: CPT

## 2019-06-19 PROCEDURE — 94760 N-INVAS EAR/PLS OXIMETRY 1: CPT

## 2019-06-19 PROCEDURE — 93005 ELECTROCARDIOGRAM TRACING: CPT | Performed by: INTERNAL MEDICINE

## 2019-06-19 PROCEDURE — 77010033711 HC HIGH FLOW OXYGEN

## 2019-06-19 PROCEDURE — 99497 ADVNCD CARE PLAN 30 MIN: CPT | Performed by: NURSE PRACTITIONER

## 2019-06-19 PROCEDURE — 97110 THERAPEUTIC EXERCISES: CPT

## 2019-06-19 PROCEDURE — 97166 OT EVAL MOD COMPLEX 45 MIN: CPT

## 2019-06-19 PROCEDURE — 65660000000 HC RM CCU STEPDOWN

## 2019-06-19 PROCEDURE — 74011000250 HC RX REV CODE- 250: Performed by: INTERNAL MEDICINE

## 2019-06-19 PROCEDURE — 74011250636 HC RX REV CODE- 250/636: Performed by: INTERNAL MEDICINE

## 2019-06-19 PROCEDURE — 99232 SBSQ HOSP IP/OBS MODERATE 35: CPT | Performed by: INTERNAL MEDICINE

## 2019-06-19 PROCEDURE — 84145 PROCALCITONIN (PCT): CPT

## 2019-06-19 PROCEDURE — 97530 THERAPEUTIC ACTIVITIES: CPT

## 2019-06-19 PROCEDURE — 85025 COMPLETE CBC W/AUTO DIFF WBC: CPT

## 2019-06-19 PROCEDURE — 85610 PROTHROMBIN TIME: CPT

## 2019-06-19 PROCEDURE — 94640 AIRWAY INHALATION TREATMENT: CPT

## 2019-06-19 RX ORDER — AMOXICILLIN AND CLAVULANATE POTASSIUM 600; 42.9 MG/5ML; MG/5ML
600 POWDER, FOR SUSPENSION ORAL EVERY 12 HOURS
Status: DISCONTINUED | OUTPATIENT
Start: 2019-06-19 | End: 2019-06-21 | Stop reason: HOSPADM

## 2019-06-19 RX ORDER — PREDNISONE 10 MG/1
40 TABLET ORAL
Status: DISCONTINUED | OUTPATIENT
Start: 2019-06-20 | End: 2019-06-21 | Stop reason: HOSPADM

## 2019-06-19 RX ORDER — FUROSEMIDE 10 MG/ML
20 INJECTION INTRAMUSCULAR; INTRAVENOUS DAILY
Status: DISCONTINUED | OUTPATIENT
Start: 2019-06-19 | End: 2019-06-21 | Stop reason: HOSPADM

## 2019-06-19 RX ORDER — ACETAMINOPHEN 500 MG
500 TABLET ORAL
Status: DISCONTINUED | OUTPATIENT
Start: 2019-06-19 | End: 2019-06-21 | Stop reason: HOSPADM

## 2019-06-19 RX ORDER — PREDNISONE 10 MG/1
40 TABLET ORAL ONCE
Status: COMPLETED | OUTPATIENT
Start: 2019-06-19 | End: 2019-06-19

## 2019-06-19 RX ADMIN — PREDNISONE 5 MG: 5 TABLET ORAL at 08:45

## 2019-06-19 RX ADMIN — LEVALBUTEROL HYDROCHLORIDE 0.63 MG: 0.63 SOLUTION RESPIRATORY (INHALATION) at 14:10

## 2019-06-19 RX ADMIN — PANTOPRAZOLE SODIUM 40 MG: 40 TABLET, DELAYED RELEASE ORAL at 08:42

## 2019-06-19 RX ADMIN — LEVALBUTEROL HYDROCHLORIDE 0.63 MG: 0.63 SOLUTION RESPIRATORY (INHALATION) at 19:10

## 2019-06-19 RX ADMIN — ACETAMINOPHEN 1000 MG: 500 TABLET, FILM COATED ORAL at 06:32

## 2019-06-19 RX ADMIN — LEVOTHYROXINE SODIUM 125 MCG: 125 TABLET ORAL at 08:45

## 2019-06-19 RX ADMIN — HYDROXYCHLOROQUINE SULFATE 300 MG: 200 TABLET, FILM COATED ORAL at 08:42

## 2019-06-19 RX ADMIN — FUROSEMIDE 20 MG: 10 INJECTION, SOLUTION INTRAMUSCULAR; INTRAVENOUS at 15:25

## 2019-06-19 RX ADMIN — FAMOTIDINE 20 MG: 20 TABLET ORAL at 17:02

## 2019-06-19 RX ADMIN — LEVETIRACETAM 500 MG: 500 TABLET ORAL at 08:45

## 2019-06-19 RX ADMIN — POTASSIUM CHLORIDE 20 MEQ: 1.5 POWDER, FOR SOLUTION ORAL at 08:41

## 2019-06-19 RX ADMIN — SULFASALAZINE 1000 MG: 500 TABLET ORAL at 08:45

## 2019-06-19 RX ADMIN — LEVALBUTEROL HYDROCHLORIDE 0.63 MG: 0.63 SOLUTION RESPIRATORY (INHALATION) at 07:31

## 2019-06-19 RX ADMIN — VENLAFAXINE HYDROCHLORIDE 225 MG: 75 CAPSULE, EXTENDED RELEASE ORAL at 08:45

## 2019-06-19 RX ADMIN — PREDNISONE 40 MG: 10 TABLET ORAL at 15:25

## 2019-06-19 RX ADMIN — BUDESONIDE 500 MCG: 0.5 INHALANT RESPIRATORY (INHALATION) at 07:31

## 2019-06-19 RX ADMIN — LEVETIRACETAM 500 MG: 500 TABLET ORAL at 17:02

## 2019-06-19 RX ADMIN — BUDESONIDE 500 MCG: 0.5 INHALANT RESPIRATORY (INHALATION) at 19:10

## 2019-06-19 RX ADMIN — SULFASALAZINE 1000 MG: 500 TABLET ORAL at 17:02

## 2019-06-19 RX ADMIN — Medication 10 ML: at 21:54

## 2019-06-19 RX ADMIN — SULFASALAZINE 1000 MG: 500 TABLET ORAL at 12:22

## 2019-06-19 RX ADMIN — HYDROCODONE BITARTRATE AND ACETAMINOPHEN 1 TABLET: 5; 325 TABLET ORAL at 08:40

## 2019-06-19 RX ADMIN — AMOXICILLIN AND CLAVULANATE POTASSIUM 600 MG: 600; 42.9 POWDER, FOR SUSPENSION ORAL at 18:46

## 2019-06-19 RX ADMIN — HYDROCODONE BITARTRATE AND ACETAMINOPHEN 1 TABLET: 5; 325 TABLET ORAL at 17:20

## 2019-06-19 RX ADMIN — LEVALBUTEROL HYDROCHLORIDE 0.63 MG: 0.63 SOLUTION RESPIRATORY (INHALATION) at 01:17

## 2019-06-19 RX ADMIN — FOLIC ACID 1 MG: 1 TABLET ORAL at 08:45

## 2019-06-19 RX ADMIN — Medication 5 ML: at 13:35

## 2019-06-19 NOTE — PROGRESS NOTES
Verbal bedside report given to jose armando Hand RN. Patient's situation, background, assessment and recommendations provided. Opportunity for questions provided. Oncoming RN assumed care of patient.

## 2019-06-19 NOTE — PROGRESS NOTES
Warfarin dosing per pharmacist    Rosangela Raman is a 68 y.o. female. Weight: 68.1 kg (150 lb 1.6 oz)    Indication:  AVR    Goal INR:  2-3 per anticoag notes    Home dose:  2 mg M, F; 4 mg all other days    Risk factors or significant drug interactions:  none    Other anticoagulants:  none    Daily Monitoring  Date  INR     Warfarin dose HGB              Notes  6/16  >6.8  Hold  11.9  6/17  >6.8  Hold  11.6   6/18  6.5  Hold  ---  6/19  5.5  Hold  11.6      Pharmacy consulted to manage warfarin for Ms. Mnauel Zavala during this admission. She presented with supratherapeutic INR and warfarin held. INR trending down, but still supratherapeutic at 5.5 today. Continue to hold warfarin until INR trends down toward therapeutic range. Daily INR. Pharmacy will continue to follow. Please call with any questions. Thank you,  Anabell Grullon.  Lorenzo Ramos, PharmD  Pharmacist   [phone]

## 2019-06-19 NOTE — PROGRESS NOTES
Problem: Dysphagia (Adult)  Goal: *Acute Goals and Plan of Care (Insert Text)  Description  LTG: Patient will tolerate least restrictive diet without overt signs or symptoms of airway compromise. MET 06/19/19  STG: Patient will tolerate puree diet and thin liquids via cup without overt signs or symptoms of airway compromise. MET 06/19/19  STG: Patient will participate in modified barium swallow study as clinically indicated. NOT INDICATED     Outcome: Progressing Towards Goal    SPEECH LANGUAGE PATHOLOGY: BEDSIDE SWALLOW NOTE: Daily Note and Discharge     NAME/AGE/GENDER: Amy Clifton is a 68 y.o. female  DATE: 6/19/2019  PRIMARY DIAGNOSIS: Elevated troponin [R74.8]      ICD-10: Treatment Diagnosis: R13.12 Oropharyngeal dysphagia  INTERDISCIPLINARY COLLABORATION: Registered Nurse  PRECAUTIONS/ALLERGIES: Latex; Bee sting [sting, bee]; Adhesive; and Diflucan [fluconazole]   ASSESSMENT:   Patient seen for continued diet tolerance and po trials for potential advancement. Patient agreeable to puree and thin liquids only. Chewable textures deferred. Easily fatigued and increased shortness of breath with puree. She requested rest breaks between every 2-3 bites. She consumed approximately 2 ounces before refusing additional intake due to fatigue. Sips of thin via straw tolerated without overt signs/symptoms of airway compromise. Recommend continue with current diet recommendations to improve safety and due to patients refusal for additional intake. Per chart review and RN report, plan for patient to transition to hospice care. Speech therapy to sign off. No further treatment indicated as she is on safest oral diet at this time. ?????? ? ? This section established at most recent assessment??????????  PROBLEM LIST (Impairments causing functional limitations):  1.  Oropharyngeal dysphagia  REHABILITATION POTENTIAL FOR STATED GOALS: Fair  PLAN OF CARE:   Patient will benefit from skilled intervention to address the following impairments. RECOMMENDATIONS AND PLANNED INTERVENTIONS (Benefits and precautions of therapy have been discussed with the patient.):  · PO:  Pureed  · Liquids:  regular thin  MEDICATIONS:  · With liquid  ASPIRATION PRECAUTIONS:  · Slow rate of intake  · Small bites/sips  · Upright at 90 degrees during meal  COMPENSATORY STRATEGIES/MODIFICATIONS INCLUDING:  · None  OTHER RECOMMENDATIONS (including follow up treatment recommendations): · Family training/education  · Patient education  RECOMMENDED DIET MODIFICATIONS DISCUSSED WITH:  · Nursing  · Patient  FREQUENCY/DURATION: Speech therapy goals met. RECOMMENDED REHABILITATION/EQUIPMENT: (at time of discharge pending progress): Due to the probability of continued deficits (see above) this patient will likely need continued skilled speech therapy after discharge. SUBJECTIVE:   More alert today. Intermittent confusion- requesting to \"go back to my room\".    History of Present Injury/Illness: Ms. Anamaria Partida  has a past medical history of A-fib [facility]), Acquired hypothyroidism, Acute CVA (cerebrovascular accident) ([facility] [address] (6/21/2014), Acute lacunar infarction [facility]), Anxiety state, unspecified, Aortic valve stenosis, congenital (2/19/2016), CAD (coronary artery disease), Cancer ([address] Candida Esophagitis (2/11/2010), Chronic kidney disease, Chronic pain, Depression, Duodenal ulcer, Dysphagia (2/19/2016), Embolism and thrombosis of unspecified artery ([address] (9/12/2014), Fibromyalgia, History of anemia (03/12/2012), History of drug overdose (09/2016), History of encephalopathy (02/04/2010), History of fracture of hip (03/10/2012), History of mastectomy, Hypertension, Immunosuppressed status ([address] (6/20/2014), Long term (current) use of anticoagulants, MCTD (mixed connective tissue disease) ([address] Nausea & vomiting, Osteoarthritis, Other ill-defined cerebrovascular disease, Pain in joint, pelvic region and thigh, Personal history of fall, Postmenopausal atrophic vaginitis, Primary localized osteoarthrosis, pelvic region and thigh, PUD (peptic ulcer disease) (2009), PVD (peripheral vascular disease) ([facility] [address] Reflux esophagitis, S/P AVR (aortic valve replacement), Seasonal allergic rhinitis due to pollen, Seizures ([facility] [address] Sleep disturbance, Symptomatic menopausal or female climacteric states, Thromboembolus ([facility] (2007), Unspecified disorder of liver, and Urticaria. She also has no past medical history of Difficult intubation, Malignant hyperthermia due to anesthesia, or Pseudocholinesterase deficiency. .  She also  has a past surgical history that includes hx hysterectomy (1975); hx tonsillectomy; hx other surgical; hx carpal tunnel release (Right, 1995); pr abdomen surgery proc unlisted (1990); hx colonoscopy (9/25/13); hx colonoscopy (2010); hx cataract removal (Right, 3/20/2014); hx tumor removal (1986); hx mastectomy (Bilateral, 2000); hx tonsil and adenoidectomy; hx aortic valve replacement (2004); hx aortic valve replacement; hx cholecystectomy; pr total hip arthroplasty (Right, 12/2012, 3/11/12, 2/2013); pr revise total hip replacement (Right, 1/17/13); hx acl reconstruction (Left, 1979); and egd (3/29/2019). Present Symptoms:    Pain Scale 1: Numeric (0 - 10)  Pain Intensity 1: 3  Pain Location 1: Back  Pain Intervention(s) 1: Medication (see MAR)  Current Medications:   No current facility-administered medications on file prior to encounter. Current Outpatient Medications on File Prior to Encounter   Medication Sig Dispense Refill    polyvinyl alcohol (LIQUIFILM TEARS) 1.4 % ophthalmic solution Administer 1 Drop to both eyes as needed. Indications: dry eye      raNITIdine (ZANTAC) 150 mg tablet Take 150 mg by mouth nightly.  folic acid (FOLVITE) 1 mg tablet Take 1 Tab by mouth daily. 30 Tab 11    hydroxychloroquine (PLAQUENIL) 200 mg tablet Take 1.5 Tabs by mouth daily. 60 Tab 3    methotrexate (RHEUMATREX) 2.5 mg tablet Take 8 tablets once a week.   Must have labs done before refills can be given. 32 Tab 3    amitriptyline (ELAVIL) 10 mg tablet Take  by mouth nightly.  zoledronic acid/mannitol-water (RECLAST IV) by IntraVENous route Once a year. Last infusion was 10/16/18      carvedilol (COREG) 12.5 mg tablet Take  by mouth two (2) times daily (with meals).  venlafaxine (EFFEXOR) 75 mg tablet Take 225 mg by mouth daily.  fluticasone (FLONASE ALLERGY RELIEF) 50 mcg/actuation nasal spray 2 Sprays by Both Nostrils route daily as needed for Rhinitis.  baclofen (LIORESAL) 20 mg tablet Take 1 Tab by mouth four (4) times daily for 30 days. (Patient taking differently: Take 20 mg by mouth three (3) times daily.) 120 Tab 12    hydrocodone/acetaminophen (NORCO PO) Take 5 mg by mouth three (3) times daily as needed for Pain.  acetaminophen (TYLENOL EXTRA STRENGTH) 500 mg tablet Take 1,000 mg by mouth every eight (8) hours.  levothyroxine (SYNTHROID) 125 mcg tablet Take  by mouth Daily (before breakfast).  hydroCHLOROthiazide (HYDRODIURIL) 25 mg tablet Take 1 Tab by mouth daily. 90 Tab 3    spironolactone (ALDACTONE) 25 mg tablet Take 1 Tab by mouth two (2) times a day. 180 Tab 3    cyanocobalamin 1,000 mcg tablet Take 1,000 mcg by mouth daily.  diphenoxylate-atropine (LOMOTIL) 2.5-0.025 mg per tablet Take  by mouth four (4) times daily as needed for Diarrhea.  nitroglycerin (NITROSTAT) 0.4 mg SL tablet 1 Tab by SubLINGual route every five (5) minutes as needed for Chest Pain. 25 Tab 5    cholecalciferol, vitamin D3, (VITAMIN D3) 2,000 unit tab Take  by mouth daily.  dicyclomine (BENTYL) 10 mg capsule Take 1 Cap by mouth daily as needed. 30 Cap 0    ondansetron hcl (ZOFRAN, AS HYDROCHLORIDE,) 4 mg tablet Take 1 Tab by mouth every eight (8) hours as needed for Nausea. 60 Tab 0    pantoprazole (PROTONIX) 40 mg tablet Take 1 Tab by mouth daily.  30 Tab 0    warfarin (COUMADIN) 2 mg tablet 2 mg Tues, Thurs, Sat and 4 mg all other days. Starting 7/4 (Patient taking differently: 2 mg on Mondays and Fridays; 4 mg all other days  Indications: Blood Clot caused by Artificial Heart Valve) 30 Tab 0    levETIRAcetam (KEPPRA) 500 mg tablet Take 500 mg by mouth two (2) times a day.  traZODone (DESYREL) 100 mg tablet Take 2 hs prn sleep 60 Tab 5    sulfaSALAzine (AZULFIDINE) 500 mg tablet Take 1,000 mg by mouth three (3) times daily as needed.  predniSONE (DELTASONE) 5 mg tablet Take 5 mg by mouth daily. Current Dietary Status:     NPO  History of reflux:  YES    Reflux medication: Pantoprazole  Social History/Home Situation:       OBJECTIVE:   Respiratory Status:  Nasal cannula  3 l/min  CXR Results:bibasilar opacities likely atelectasis or penumonia  MRI/CT Results: NO acute abnormality. Oral Motor Structure/Speech:  Oral-Motor Structure/Motor Speech  Labial: No impairment  Dentition: Upper dentures  Oral Hygiene: Adequate  Lingual: No impairment    Cognitive and Communication Status:  Neurologic State: Alert  Orientation Level: Oriented to person;Disoriented to place; Disoriented to situation  Cognition: Follows commands  Perception: Appears intact  Perseveration: No perseveration noted  Safety/Judgement: Fall prevention    BEDSIDE SWALLOW EVALUATION  Oral Assessment:  Oral Assessment  Labial: No impairment  Dentition: Upper dentures  Lingual: No impairment  P.O. Trials:  Patient Position: Upright in bed    The patient was given the following:   Consistency Presented: Puree; Thin liquid  How Presented: SLP-fed/presented;Self-fed/presented;Cup/sip;Spoon;Straw;Successive swallows    ORAL PHASE:  Bolus Acceptance: No impairment  Bolus Formation/Control: Impaired  Propulsion: No impairment  Type of Impairment: Delayed  Oral Residue: None    PHARYNGEAL PHASE:  Initiation of Swallow: No impairment  Laryngeal Elevation: Functional  Aspiration Signs/Symptoms: None  Vocal Quality: Low volume     Effective Modifications: None     Pharyngeal Phase Characteristics: Easily fatigued ; Poor endurance    OTHER OBSERVATIONS:  Rate/bite size: Questionable   Endurance:  Impaired   Comments: Tool Used: Dysphagia Outcome and Severity Scale (MARÍA)    Score Comments   Normal Diet  ? 7 With no strategies or extra time needed   Functional Swallow  ? 6 May have mild oral or pharyngeal delay       Mild Dysphagia    ? 5 Which may require one diet consistency restricted (those who demonstrate penetration which is entirely cleared on MBS would be included)   Mild-Moderate Dysphagia  ? 4 With 1-2 diet consistencies restricted       Moderate Dysphagia  ? 3 With 2 or more diet consistencies restricted       Moderately Severe Dysphagia  ? 2 With partial PO strategies (trials with ST only)       Severe Dysphagia  ? 1 With inability to tolerate any PO safely          Score:  Initial: 4 Most Recent: X (Date: --)   Interpretation of Tool: The Dysphagia Outcome and Severity Scale (MARÍA) is a simple, easy-to-use, 7-point scale developed to systematically rate the functional severity of dysphagia based on objective assessment and make recommendations for diet level, independence level, and type of nutrition.        [place] / [place] / Product Type: Medicare /     TREATMENT:    (In addition to Assessment/Re-Assessment sessions the following treatments were rendered)  Assessment/Reassessment only, no treatment provided today  MODALITIES:                                                                    ORAL MOTOR  EXERCISES:                                                                                                                                                                      LARYNGEAL / PHARYNGEAL EXERCISES: __________________________________________________________________________________________________  Safety:   After treatment position/precautions:  · Call light within reach  · RN notified  · Upright in Bed  ·   Recommendations/Intent for next treatment session: Speech therapy goals met. No additional treatment indicated at this time.    Total Treatment Duration:  Time In: 1235  Time Out: [address] CCC-SLP

## 2019-06-19 NOTE — PROGRESS NOTES
Progress Note    Patient: Christiano Sagastume MRN: [id number]  SSN: [id number]    YOB: 1945  Age: 68 y.o. Sex: female      Admit Date: 6/17/2019    LOS: 2 days     Subjective:     Patient was seen at bedside. Alert. Clinically better. Will resume oral antibiotics ( augmentin.). Has wheezing on the R hemithorax: dose of prednisone increased to 40 mg po daily. Continue nebs. Has sinus tachycardia. Chest x ray with lung congestion. IV lasix ordered. Objective:     Vitals:    06/19/19 1410 06/19/19 1525 06/19/19 1552 06/19/19 1714   BP:  105/74  114/78   Pulse:  (!) 116  (!) 116   Resp:    19   Temp:    97.5 °F (36.4 °C)   SpO2: 94%  91% 94%   Weight:            Intake and Output:  Current Shift: 06/19 0701 - 06/19 1900  In: 580 [P.O.:580]  Out: 526 [Urine:525]  Last three shifts: 06/17 1901 - 06/19 0700  In: -   Out: 1128 [Urine:1475]    Physical Exam:   GENERAL: alert, appears stated age  EYE: conjunctivae/corneas clear. LYMPHATIC: Cervical, supraclavicular, and axillary nodes normal.   THROAT & NECK: normal and no erythema or exudates noted. LUNG: wheezing of the R lung   HEART: regular rate and rhythm, S1, S2 normal, no murmur, click, rub or gallop  ABDOMEN: soft, non-tender. Bowel sounds normal. No masses,  no organomegaly  EXTREMITIES:  extremities normal, atraumatic, no cyanosis or edema  SKIN: Normal.  NEUROLOGIC: No focal deficits   PSYCHIATRIC: non focal    Lab/Data Review: All lab results for the last 24 hours reviewed.          Assessment:     Principal Problem:    Acute respiratory failure with hypoxia ([address] (6/16/2019)    Active Problems:    Chronic pain (2/4/2010)      S/P AVR (aortic valve replacement) (2/4/2010)      HTN (hypertension)--Dr. Caryle Brunet (1/17/2013)      GERD (gastroesophageal reflux disease) (1/17/2013)      Immunosuppressed status ([address] (6/20/2014)      Acute systolic congestive heart failure ([facility] [address] (6/17/2019)      Elevated troponin (6/17/2019)        Plan:     - continue immunosuppressive drugs   -chest x ray yesterday with persistent lung congestion  -resumed lasix today   -Clinically improving.  Resumed antibiotics   -has active wheezing: prednisone increased to 40 mg po daily   -monitor clinical progress     Signed By: Tony Nguyễn MD     June 19, 2019

## 2019-06-19 NOTE — PROGRESS NOTES
Problem: Self Care Deficits Care Plan (Adult)  Goal: *Acute Goals and Plan of Care (Insert Text)  Description  1. Patient will complete total body bathing and dressing with minimal assistance and adaptive equipment as needed. 2. Patient will complete toileting with minimal assistance and adaptive equipment as needed. 3. Patient will tolerate 20 minutes of OT treatment with up to 5 rest breaks to increase activity tolerance for ADLs. 4. Patient will complete functional transfers with stand by assistance and adaptive equipment as needed. 5. Patient will demonstrate modified independence with therapeutic exercise HEP to increase strength in BUEs for increased safety and independence with functional transfers. 6. Patient will complete functional mobility for ADLs with stand by assistance and adaptive equipment as needed. Timeframe: 7 visits      Outcome: Progressing Towards Goal     OCCUPATIONAL THERAPY: Initial Assessment, Daily Note and PM 6/19/2019  INPATIENT: OT Visit Days: 1  [place] / [place] / Product Type: Medicare /      NAME/AGE/GENDER: Piter Villa is a 68 y.o. female   PRIMARY DIAGNOSIS:  Elevated troponin [R74.8] Acute respiratory failure with hypoxia ([facility] [address]   Acute respiratory failure with hypoxia ([facility] [address]          ICD-10: Treatment Diagnosis:    Generalized Muscle Weakness (M62.81)  Other lack of cordination (R27.8)  History of falling (Z91.81)  Dizziness and Giddiness (R42)   Precautions/Allergies:    Fall precautions  Latex; Bee sting [sting, bee]; Adhesive; and Diflucan [fluconazole]      ASSESSMENT:     Ms. Matthew Cruz is a 68 y.o. female admitted with the above. At baseline, pt lives at [place] and reports independence to modified independence with most ADLs, only requiring assistance to don bra, and with ambulation utilizing RW. No supplemental O2. 3 recent falls. Upon arrival supine in bed, drowsy and agreeable to OT evaluation and treatment. BUE assessment revealed AROM and strength generally decreased in BUEs. Pt completed bed mobility with SBA and additional time. Pt demonstrates fair sitting balance and poor activity tolerance, needing to return to supine within minutes of sitting up. Treatment initiated to include bed mobility with Florencia, functional transfers Florencia/cueing for technique and ambulation Florencia/RW/cueing for RW management. O2 sats 97% on 3L O2 NC. Pt required seated rest break before attempting to scoot hips back in chair for upright positioning. Pt left seated in chair with call bell within reach. Pt's deficits include decreased strength, activity tolerance, balance, functional mobility and functional transfers, all of which negatively impact safety and independence with ADLs. Veronica Cain is currently functioning below baseline and would benefit from continued OT to increase safety and independence with ADLs. Will follow. This section established at most recent assessment   PROBLEM LIST (Impairments causing functional limitations):  Decreased Strength  Decreased ADL/Functional Activities  Decreased Transfer Abilities  Decreased Ambulation Ability/Technique  Decreased Balance  Increased Pain  Decreased Activity Tolerance  Decreased Pacing Skills  Decreased Work Simplification/Energy Conservation Techniques  Increased Fatigue  Increased Shortness of Breath  Decreased [place] with Home Exercise Program   INTERVENTIONS PLANNED: (Benefits and precautions of occupational therapy have been discussed with the patient.)  Activities of daily living training  Adaptive equipment training  Balance training  Clothing management  Donning&doffing training  Hygiene training  Neuromuscular re-eduation  Re-evaluation  Therapeutic activity  Therapeutic exercise     TREATMENT PLAN: Frequency/Duration: Follow patient 3x/week to address above goals.   Rehabilitation Potential For Stated Goals: Good     REHAB RECOMMENDATIONS (at time of discharge pending progress):    Placement: It is my opinion, based on this patient's performance to date, that Ms. Danna Bach may benefit from intensive therapy at a [facility] after discharge due to the functional deficits listed above that are likely to improve with skilled rehabilitation and concerns that he/she may be unsafe to be unsupervised at home due to decreased activity tolerance, balance, and ambulation impacting ADLs and increasing risk of falls . Equipment:   TBD               OCCUPATIONAL PROFILE AND HISTORY:   History of Present Injury/Illness (Reason for Referral):  See H&P.    Past Medical History/Comorbidities:   Ms. Danna Bach  has a past medical history of A-fib [facility]), Acquired hypothyroidism, Acute CVA (cerebrovascular accident) ([facility] [address] (6/21/2014), Acute lacunar infarction [facility]), Anxiety state, unspecified, Aortic valve stenosis, congenital (2/19/2016), CAD (coronary artery disease), Cancer ([address] Candida Esophagitis (2/11/2010), Chronic kidney disease, Chronic pain, Depression, Duodenal ulcer, Dysphagia (2/19/2016), Embolism and thrombosis of unspecified artery ([address] (9/12/2014), Fibromyalgia, History of anemia (03/12/2012), History of drug overdose (09/2016), History of encephalopathy (02/04/2010), History of fracture of hip (03/10/2012), History of mastectomy, Hypertension, Immunosuppressed status ([address] (6/20/2014), Long term (current) use of anticoagulants, MCTD (mixed connective tissue disease) ([address] Nausea & vomiting, Osteoarthritis, Other ill-defined cerebrovascular disease, Pain in joint, pelvic region and thigh, Personal history of fall, Postmenopausal atrophic vaginitis, Primary localized osteoarthrosis, pelvic region and thigh, PUD (peptic ulcer disease) (2009), PVD (peripheral vascular disease) ([address] Reflux esophagitis, S/P AVR (aortic valve replacement), Seasonal allergic rhinitis due to pollen, Seizures ([address] Sleep disturbance, Symptomatic menopausal or female climacteric states, Thromboembolus ([facility] [address] (2007), Unspecified disorder of liver, and Urticaria. She also has no past medical history of Difficult intubation, Malignant hyperthermia due to anesthesia, or Pseudocholinesterase deficiency. Ms. Marbella Carlos  has a past surgical history that includes hx hysterectomy (1975); hx tonsillectomy; hx other surgical; hx carpal tunnel release (Right, 1995); pr abdomen surgery proc unlisted (1990); hx colonoscopy (9/25/13); hx colonoscopy (2010); hx cataract removal (Right, 3/20/2014); hx tumor removal (1986); hx mastectomy (Bilateral, 2000); hx tonsil and adenoidectomy; hx aortic valve replacement (2004); hx aortic valve replacement; hx cholecystectomy; pr total hip arthroplasty (Right, 12/2012, 3/11/12, 2/2013); pr revise total hip replacement (Right, 1/17/13); hx acl reconstruction (Left, 1979); and egd (3/29/2019). Social History/Living Environment:   Home Environment: [address] Name: [facility]  One/Two Story Residence: One story  Living Alone: No  Support Systems: Assisted living  Patient Expects to be Discharged to[de-identified] Assisted living  Current DME Used/Available at Home: helder Benz, [address] [facility] chair, Wheelchair  Tub or Shower Type: Shower  Prior Level of Function/Work/Activity:  At baseline, pt lives at [facility] and reports independence to modified independence with most ADLs, only requiring assistance to don bra, and with ambulation utilizing RW. No supplemental O2. 3 recent falls. Dominant Side:         RIGHT    Personal Factors:          Sex:  female        Age:  68 y.o.         Other factors that influence how disability is experienced by the patient:  Multiple co-morbidities, falls    Number of Personal Factors/Comorbidities that affect the Plan of Care: Expanded review of therapy/medical records (1-2):  MODERATE COMPLEXITY   ASSESSMENT OF OCCUPATIONAL PERFORMANCE[de-identified]   Activities of Daily Living:   Basic ADLs (From Assessment) Complex ADLs (From Assessment)   Feeding: Minimum assistance  Oral Facial Hygiene/Grooming: Moderate assistance  Bathing: Maximum assistance  Upper Body Dressing: Moderate assistance  Lower Body Dressing: Total assistance  Toileting: Total assistance Instrumental ADL  Meal Preparation: Total assistance  Homemaking: Total assistance  Medication Management: Total assistance  Financial Management: Total assistance   Grooming/Bathing/Dressing Activities of Daily Living     Cognitive Retraining  Safety/Judgement: Fall prevention                       Bed/Mat Mobility  Supine to Sit: Stand-by assistance; Additional time  Sit to Supine: Stand-by assistance; Additional time  Sit to Stand: Minimum assistance  Stand to Sit: Minimum assistance  Bed to Chair: Minimum assistance  Scooting: Minimum assistance       Most Recent Physical Functioning:   Gross Assessment:  AROM: Generally decreased, functional(BUEs)  Strength: Generally decreased, functional(BUEs)  Sensation: Intact(BUEs to light touch)               Posture:  Posture (WDL): Exceptions to WDL  Posture Assessment: Rounded shoulders  Balance:  Sitting: Impaired  Sitting - Static: Fair (occasional)  Sitting - Dynamic: Fair (occasional)  Standing: Impaired  Standing - Static: Fair;Constant support  Standing - Dynamic : Fair;Constant support Bed Mobility:  Supine to Sit: Stand-by assistance; Additional time  Sit to Supine: Stand-by assistance; Additional time  Scooting: Minimum assistance  Wheelchair Mobility:     Transfers:  Sit to Stand: Minimum assistance  Stand to Sit: Minimum assistance  Bed to Chair: Minimum assistance            Patient Vitals for the past 6 hrs:   BP SpO2 O2 Flow Rate (L/min) Pulse   06/19/19 0836 117/75 91 % -- (!) 118   06/19/19 1143 113/63 94 % 45 l/min (!) 109   06/19/19 1145 -- 93 % 3 l/min --   06/19/19 1200 -- -- 3 l/min --   06/19/19 1339 -- 97 % 3 l/min --   06/19/19 1410 -- 94 % 3 l/min --       Mental Status  Neurologic State: Drowsy  Orientation Level: Oriented X4(\"[place]\")  Cognition: Follows commands  Perception: Appears intact  Perseveration: No perseveration noted  Safety/Judgement: Fall prevention                          Physical Skills Involved:  Balance  Strength  Activity Tolerance  Pain (Chronic) Cognitive Skills Affected (resulting in the inability to perform in a timely and safe manner):  Sustained Attention Psychosocial Skills Affected:  Habits/Routines  Environmental Adaptation  Self-Awareness  Awareness of Others  Social Roles   Number of elements that affect the Plan of Care: 5+:  HIGH COMPLEXITY   CLINICAL DECISION MAKIN [facility]  Ne? ?6 Clicks? Daily Activity Inpatient Short Form  How much help from another person does the patient currently need. .. Total A Lot A Little None   1. Putting on and taking off regular lower body clothing? ? 1   ? 2   ? 3   ? 4   2. Bathing (including washing, rinsing, drying)? ? 1   ? 2   ? 3   ? 4   3. Toileting, which includes using toilet, bedpan or urinal?   ? 1   ? 2   ? 3   ? 4   4. Putting on and taking off regular upper body clothing? ? 1   ? 2   ? 3   ? 4   5. Taking care of personal grooming such as brushing teeth? ? 1   ? 2   ? 3   ? 4   6. Eating meals? ? 1   ? 2   ? 3   ? 4   © 2007, Trustees of [address] [place] [address] [address] [address], under license to [employer]. All rights reserved      Score:  Initial: 11 19 Most Recent: X (Date: -- )    Interpretation of Tool:  Represents activities that are increasingly more difficult (i.e. Bed mobility, Transfers, Gait). Medical Necessity:     Patient demonstrates good   rehab potential due to higher previous functional level. Reason for Services/Other Comments:  Patient continues to require skilled intervention due to inability to complete ADLs at prior level of independence   .    Use of outcome tool(s) and clinical judgement create a POC that gives a: MODERATE COMPLEXITY         TREATMENT:   (In addition to Assessment/Re-Assessment sessions the following treatments were rendered)     Pre-treatment Symptoms/Complaints:    Pain: Initial:   Pain Intensity 1: (Pt reports pain in back, unable to quantify)  Pain Location 1: Back  Pain Intervention(s) 1: Ambulation/Increased Activity, Repositioned  Post Session:  Pt reports decreased back pain with sitting up in chair     Therapeutic Activity: (    8 minutes): Therapeutic activities including Bed transfers, Chair transfers and Ambulation on level ground to improve mobility, strength, balance, coordination and activity tolerance . Required minimal   to promote static and dynamic balance in standing and RW management . Treatment initiated to include bed mobility with Florencia, functional transfers Florencia/cueing for technique and ambulation Florencia/RW/cueing for RW management. O2 sats 97% on 3L O2 NC. Pt required seated rest break before attempting to scoot hips back in chair for upright positioning. Braces/Orthotics/Lines/Etc:   zapata catheter  O2 Device: Nasal cannula  Treatment/Session Assessment:    Response to Treatment:  Poor activity tolerance  Interdisciplinary Collaboration:   Occupational Therapist  Registered Nurse  After treatment position/precautions:   Up in chair  Bed alarm/tab alert on  Bed/Chair-wheels locked  Call light within reach  BLEs elevated    Compliance with Program/Exercises: Compliant all of the time, Will assess as treatment progresses. Recommendations/Intent for next treatment session: \"Next visit will focus on advancements to more challenging activities and reduction in assistance provided\".   Total Treatment Duration:  OT Patient Time In/Time Out  Time In: 1579  Time Out: EDMAR Hines/GARIMA

## 2019-06-19 NOTE — ACP (ADVANCE CARE PLANNING)
In addition to the E/M time spent, and additional 20 minutes was spent on ACP. Pt tells me she has a more current HCPOA, naming her daughter in law, Janine Putnam, as agent. Pt states she wants to be comfortable and back at her [facility]. Introduced concept of hospice support at her [facility] to help her reach these goals, and she is receptive to this. Nimamendy Nietojose, has located a number for Janine Putnam ([phone]). I spoke with Janine Putnam, and provided a medical update. Janine Putnam confirms that she is pt's HCPOA, and her daughter in law. Janine Putnam was not aware that pt was in the hospital.  We discussed hospice recommendation, and Janine Putnam is agreeable to this, as well. She states pt's relationship with her son, Jonathan Arias, is \"on and off\" but states he did see pt on Mother's Day and pt was happy to see him. She states she will call Tee and update him on pt. Discussed with Teri Valdez CM. She will find out which hospice pt's skilled nursing prefers to use. Discussed with Dr Steven Booker, as well. Pt will need to wean from current O2 support before transfer back to skilled nursing can be considered.         Durga Andrew, FNP-C  Palliative Care

## 2019-06-19 NOTE — PROGRESS NOTES
Cliff Knight  Admission Date: 6/17/2019             Daily Progress Note: 6/19/2019    The patient's chart is reviewed and the patient is discussed with the staff. Patient is a 68 y.o.  female seen and evaluated at the request of Dr. Macey Tolbert. Pt has a history of mixed connective tissue disease on Reclast, sulfasalazine, methotrexate, plaquenil, and prednisone 5mg daily, mechanical aortic valve on chronic coumadin, dementia, and chronic pain. She resides at [facility] and was recently treated for pneumonia. She presented to the ER at [facility] on 6/16/19 with acute respiratory failure with hypoxemia. She was admitted and we were consulted to assist. She was initially treated for pneumonia but her BNP was 818 this AM and abx discontinued and she was given IV lasix early this AM.   Pt lying in bed on 40L at 65%. Pt only awakes briefly and moans. She was unable to answer any questions. Per report, at [facility], she is alert and able to converse. PCO2 this AM was 32. She is unable to assist with any history. Subjective:      On optiflow 45%  Less sob      Current Facility-Administered Medications   Medication Dose Route Frequency    potassium chloride (KLOR-CON) packet for solution 20 mEq  20 mEq Oral DAILY    levalbuterol (XOPENEX) nebulizer soln 0.63 mg/3 mL  0.63 mg Nebulization Q6H RT    budesonide (PULMICORT) 500 mcg/2 ml nebulizer suspension  500 mcg Nebulization BID RT    sodium chloride (NS) flush 5-40 mL  5-40 mL IntraVENous Q8H    sodium chloride (NS) flush 5-40 mL  5-40 mL IntraVENous PRN    acetaminophen (TYLENOL) tablet 1,000 mg  1,000 mg Oral Q8H    albuterol (PROVENTIL VENTOLIN) nebulizer solution 2.5 mg  2.5 mg Nebulization Q6H PRN    dicyclomine (BENTYL) capsule 10 mg  10 mg Oral DAILY PRN    famotidine (PEPCID) tablet 20 mg  20 mg Oral QPM    folic acid (FOLVITE) tablet 1 mg  1 mg Oral DAILY    HYDROcodone-acetaminophen (NORCO) 5-325 mg per tablet 1 Tab  1 Tab Oral TID PRN    hydroxychloroquine (PLAQUENIL) tablet 300 mg  300 mg Oral DAILY    levETIRAcetam (KEPPRA) tablet 500 mg  500 mg Oral BID    levothyroxine (SYNTHROID) tablet 125 mcg  125 mcg Oral ACB    nitroglycerin (NITROSTAT) tablet 0.4 mg  0.4 mg SubLINGual Q5MIN PRN    pantoprazole (PROTONIX) tablet 40 mg  40 mg Oral ACB    polyvinyl alcohol (LIQUIFILM TEARS) 1.4 % ophthalmic solution 1 Drop  1 Drop Both Eyes PRN    predniSONE (DELTASONE) tablet 5 mg  5 mg Oral DAILY WITH BREAKFAST    sulfaSALAzine (AZULFIDINE) tablet 1,000 mg  1,000 mg Oral TID WITH MEALS    venlafaxine-SR (EFFEXOR-XR) capsule 225 mg  225 mg Oral DAILY WITH BREAKFAST    Warfarin on hold - Rx dosing   Other QPM       Review of Systems    Constitutional: negative for fever, chills, sweats  Cardiovascular: negative for chest pain, palpitations, syncope, edema  Gastrointestinal:  negative for dysphagia, reflux, vomiting, diarrhea, abdominal pain, or melena  Neurologic:  negative for focal weakness, numbness, headache    Objective:     Vitals:    06/19/19 0117 06/19/19 0501 06/19/19 0517 06/19/19 0836   BP:   102/70 117/75   Pulse:   (!) 112 (!) 118   Resp:   22 17   Temp:   98.7 °F (37.1 °C) 98.4 °F (36.9 °C)   SpO2: 96% 94% 96% 91%   Weight:   150 lb 1.6 oz (68.1 kg)      Intake and Output:   06/17 1901 - 06/19 0700  In: -   Out: 1475 [Urine:1475]  06/19 0701 - 06/19 1900  In: 480 [P.O.:480]  Out: 1     Physical Exam:   Constitution:  the patient is well developed and in no acute distress  EENMT:  Sclera clear, pupils equal, oral mucosa moist  Respiratory: crackles  Cardiovascular:  RRR without M,G,R  Gastrointestinal: soft and non-tender; with positive bowel sounds. Musculoskeletal: warm without cyanosis. There is plus 1 lower extremity edema.   Skin:  no jaundice or rashes, no wounds   Neurologic: no gross neuro deficits     Psychiatric:  Awake     CXR:                Recent Labs     06/19/19  0545 06/18/19  0616 06/17/19  1011 06/17/19  0607 06/16/19  1454 06/16/19  1453   WBC 7.0 6.4  --  7.7 9.0  --    HGB 11.6* 11.7  --  11.6* 11.9  --    HCT 36.5 36.7  --  36.7 37.5  --     157  --  134* 158  --    INR  --  6.5* >6.8*  --   --  >6.8*     Recent Labs     06/19/19  0545 06/18/19  0616 06/17/19  1628 06/17/19  0607 06/16/19  1454    136  --  136 134*   K 3.5 3.3*  --  3.0* 3.5    99  --  102 99   CO2 27 27  --  24 28   * 125*  --  89 144*   BUN 25* 23  --  16 20   CREA 0.52* 0.61  --  0.65 0.78   CA 9.2 8.8  --  8.2* 8.9   TROIQ  --   --  16.20* 14.50*  --    ALB  --   --   --   --  3.0*   TBILI  --   --   --   --  0.3   ALT  --   --   --   --  27   SGOT  --   --   --   --  33     Recent Labs     06/17/19  0133 06/16/19  1822   PHI 7.440 7.410   PCO2I 32.1* 35.7   PO2I 77 46*   HCO3I 21.8* 22.6         Assessment:  (Medical Decision Making)     Hospital Problems  Date Reviewed: 6/17/2019          Codes Class Noted POA    Acute systolic congestive heart failure (HCC) ICD-10-CM: I50.21  ICD-9-CM: 428.21, 428.0  6/17/2019 Yes    On Lasix      Elevated troponin ICD-10-CM: R74.8  ICD-9-CM: 790.6  6/17/2019 Yes    Per card      * (Principal) Acute respiratory failure with hypoxia (HCC)     on optiflow , most likely related to cardiomyopathy, CHF ICD-10-CM: J96.01  ICD-9-CM: 518.81  6/16/2019 Yes        Immunosuppressed status ([address] ICD-10-CM: D89.9  ICD-9-CM: 279.9  6/20/2014 Yes    On steroids    HTN (hypertension)--Dr. Ashley Dumont ICD-10-CM: I10  ICD-9-CM: 401.9  1/17/2013 Yes        GERD (gastroesophageal reflux disease) ICD-10-CM: K21.9  ICD-9-CM: 530.81  1/17/2013 Yes        Chronic pain (Chronic) ICD-10-CM: F11.60  ICD-9-CM: 338.29  2/4/2010 Yes        S/P AVR (aortic valve replacement) (Chronic) ICD-10-CM: Z95.2  ICD-9-CM: V43.3  2/4/2010 Yes              Plan:  (Medical Decision Making)     -continue diuresis   - wean optiflow and change to HFNC  -agreed with hospice at longterm      More than 50% of the time documented was spent in face-to-face contact with the patient and in the care of the patient on the floor/unit where the patient is located.     Malika Parks MD

## 2019-06-19 NOTE — PROGRESS NOTES
Care Management Interventions  Mode of Transport at Discharge: BLS  Transition of Care Consult (CM Consult): Discharge Planning, Home Hospice(vs Hospice House)  Discharge Durable Medical Equipment: No  Physical Therapy Consult: Yes  Occupational Therapy Consult: Yes  Current Support Network: Assisted Living  Plan discussed with Pt/Family/Caregiver: No  Discharge Location  Discharge Placement: Home with hospice       left for [address] [facility] Frank Kirill to coordinate DC back to [address] [place] [address] with hospice service. Per Palliative Care [facility] & pt are in agreement with this plan. CM to continue to follow for dc needs.

## 2019-06-19 NOTE — PROGRESS NOTES
6/19/2019 11:25 AM    Admit Date: 6/17/2019    Admit Diagnosis: Elevated troponin [R74.8]      Subjective:   denies cp or sob      Objective:      Visit Vitals  /75   Pulse (!) 118   Temp 98.4 °F (36.9 °C)   Resp 17   Wt 68.1 kg (150 lb 1.6 oz)   SpO2 91%   BMI 24.98 kg/m²       Physical Exam:  Dianah Andrea, Well Nourished, No Acute Distress, Alert & Oriented x 3, appropriate mood. Neck- supple, no JVD  CV- regular rate and rhythm no MRG  Lung- clear bilaterally  Abd- soft, nontender, nondistended  Ext- no edema bilaterally. Skin- warm and dry        Data Review:   Recent Labs     06/19/19  0545 06/18/19  0616    136   K 3.5 3.3*   BUN 25* 23   CREA 0.52* 0.61   WBC 7.0 6.4   HGB 11.6* 11.7   HCT 36.5 36.7    157   INR  --  6.5*       Assessment/Plan:     Principal Problem:    Acute respiratory failure with hypoxia (HCC) (6/16/2019)Improved with current therapy. Will continue medications      Active Problems:    Chronic pain (2/4/2010)      S/P AVR (aortic valve replacement) (2/4/2010)      HTN (hypertension)--Dr. Harish Grey (1/17/2013)bp better but still low      GERD (gastroesophageal reflux disease) (1/17/2013)      Immunosuppressed status ([facility] [address] (6/20/2014)      Acute systolic congestive heart failure ([facility] [address] (6/17/2019)Stable. Continue current medical therapy.   no ace/arb or beta blocker due to hypotension- add if bp improves- holding diureticcs      Elevated troponin (6/17/2019)- presumed demand ischemia- no plan for invasive eval with multiple co-morbidities

## 2019-06-19 NOTE — PROGRESS NOTES
Problem: Mobility Impaired (Adult and Pediatric)  Goal: *Acute Goals and Plan of Care (Insert Text)  Description  LTG:  (1.)Ms. Elin Kinney will move from supine to sit and sit to supine , scoot up and down and roll side to side in bed with STAND BY ASSIST within 7 treatment day(s). (2.)Ms. Elin Kinney will transfer from bed to chair and chair to bed with CONTACT GUARD ASSIST using the least restrictive device within 7 treatment day(s). (3.)Ms. Elin Kinney will ambulate with CONTACT GUARD ASSIST for 75 feet with the least restrictive device within 7 treatment day(s). (4.)Ms. Elin Kinney will participate in therapeutic activity/exercises x 23 minutes for increased strength within 7 treatment days. (5.)Ms. Elin Kinney will maintain static/dynamic sitting x 12 minutes with SUPERVISION for improved balance within 7 treatment days. ________________________________________________________________________________________________     Outcome: Progressing Towards Goal     PHYSICAL THERAPY: Daily Note and PM 6/19/2019  INPATIENT: PT Visit Days : 2  [place] / [place] / Product Type: Medicare /       NAME/AGE/GENDER: Claudean Prose is a 68 y.o. female   PRIMARY DIAGNOSIS: Elevated troponin [R74.8] Acute respiratory failure with hypoxia ([facility]   Acute respiratory failure with hypoxia ([facility]         ICD-10: Treatment Diagnosis:    · Generalized Muscle Weakness (M62.81)  · Difficulty in walking, Not elsewhere classified (R26.2)   Precaution/Allergies:  Latex; Bee sting [sting, bee]; Adhesive; and Diflucan [fluconazole]      ASSESSMENT:     Ms. Elin Kinney was supine in bed upon arrival and very lethargic. Pt performed bed exercises with moderate cuing due to drowsiness. No progress noted and SpO2 91% after treatment. This section established at most recent assessment   PROBLEM LIST (Impairments causing functional limitations):  1. Decreased Strength  2. Decreased ADL/Functional Activities  3.  Decreased Transfer Abilities  4. Decreased Ambulation Ability/Technique  5. Decreased Balance  6. Decreased Activity Tolerance  7. Increased Fatigue  8. Increased Shortness of Breath  9. Decreased Cognition   INTERVENTIONS PLANNED: (Benefits and precautions of physical therapy have been discussed with the patient.)  1. Balance Exercise  2. Bed Mobility  3. Family Education  4. Gait Training  5. Neuromuscular Re-education/Strengthening  6. Therapeutic Activites  7. Therapeutic Exercise/Strengthening  8. Transfer Training     TREATMENT PLAN: Frequency/Duration: 3 times a week for duration of hospital stay  Rehabilitation Potential For Stated Goals: [facility] (at time of discharge pending progress):    Placement: It is my opinion, based on this patient's performance to date, that Ms. Yao Brewer may benefit from intensive therapy at a [address] [place] [address] after discharge due to the functional deficits listed above that are likely to improve with skilled rehabilitation and concerns that he/she may be unsafe to be unsupervised at home due to decreased functional mobility and activity tolerance .   Equipment:    None at this time              HISTORY:   History of Present Injury/Illness (Reason for Referral):  See H&P  Past Medical History/Comorbidities:   Ms. Yao Brewer  has a past medical history of A-fib ([facility] Acquired hypothyroidism, Acute CVA (cerebrovascular accident) ([facility] (6/21/2014), Acute lacunar infarction [facility]), Anxiety state, unspecified, Aortic valve stenosis, congenital (2/19/2016), CAD (coronary artery disease), Cancer ([address] Candida Esophagitis (2/11/2010), Chronic kidney disease, Chronic pain, Depression, Duodenal ulcer, Dysphagia (2/19/2016), Embolism and thrombosis of unspecified artery ([address] (9/12/2014), Fibromyalgia, History of anemia (03/12/2012), History of drug overdose (09/2016), History of encephalopathy (02/04/2010), History of fracture of hip (03/10/2012), History of mastectomy, Hypertension, Immunosuppressed status ([facility] (6/20/2014), Long term (current) use of anticoagulants, MCTD (mixed connective tissue disease) ([facility] Nausea & vomiting, Osteoarthritis, Other ill-defined cerebrovascular disease, Pain in joint, pelvic region and thigh, Personal history of fall, Postmenopausal atrophic vaginitis, Primary localized osteoarthrosis, pelvic region and thigh, PUD (peptic ulcer disease) (2009), PVD (peripheral vascular disease) ([facility] Reflux esophagitis, S/P AVR (aortic valve replacement), Seasonal allergic rhinitis due to pollen, Seizures ([facility] Sleep disturbance, Symptomatic menopausal or female climacteric states, Thromboembolus ([facility] (2007), Unspecified disorder of liver, and Urticaria. She also has no past medical history of Difficult intubation, Malignant hyperthermia due to anesthesia, or Pseudocholinesterase deficiency. Ms. Marbella Carlos  has a past surgical history that includes hx hysterectomy (1975); hx tonsillectomy; hx other surgical; hx carpal tunnel release (Right, 1995); pr abdomen surgery proc unlisted (1990); hx colonoscopy (9/25/13); hx colonoscopy (2010); hx cataract removal (Right, 3/20/2014); hx tumor removal (1986); hx mastectomy (Bilateral, 2000); hx tonsil and adenoidectomy; hx aortic valve replacement (2004); hx aortic valve replacement; hx cholecystectomy; pr total hip arthroplasty (Right, 12/2012, 3/11/12, 2/2013); pr revise total hip replacement (Right, 1/17/13); hx acl reconstruction (Left, 1979); and egd (3/29/2019).   Social History/Living Environment:   Home Environment: [address] Name: [place]  One/Two Story Residence: One story  Living Alone: No  Support Systems: Assisted living  Patient Expects to be Discharged to[de-identified] Assisted living  Current DME Used/Available at Home: helder Benz, [address] [facility] chair, Wheelchair  Tub or Shower Type: Shower  Prior Level of Function/Work/Activity:  Not sure due to confusion and lethargy but she did state she was ambulatory PTA.     Number of Personal Factors/Comorbidities that affect the Plan of Care: 3+: HIGH COMPLEXITY   EXAMINATION:   Most Recent Physical Functioning:   Gross Assessment:  AROM: Generally decreased, functional  Strength: Generally decreased, functional               Posture:  Posture (WDL): Exceptions to WDL  Posture Assessment: Rounded shoulders  Balance:    Bed Mobility:     Wheelchair Mobility:     Transfers:     Gait:            Body Structures Involved:  1. Heart  2. Lungs  3. Muscles Body Functions Affected:  1. Cardio  2. Respiratory  3. Neuromusculoskeletal  4. Movement Related Activities and Participation Affected:  1. Learning and Applying Knowledge  2. General Tasks and Demands  3. Mobility  4. Self Care  5. Domestic Life  6. Community, Social and [place]   Number of elements that affect the Plan of Care: 4+: HIGH COMPLEXITY   CLINICAL PRESENTATION:   Presentation: Evolving clinical presentation with changing clinical characteristics: MODERATE COMPLEXITY   CLINICAL DECISION MAKIN [facility] Inpatient Short Form  How much difficulty does the patient currently have. .. Unable A Lot A Little None   1. Turning over in bed (including adjusting bedclothes, sheets and blankets)? ? 1   ? 2   ? 3   ? 4   2. Sitting down on and standing up from a chair with arms ( e.g., wheelchair, bedside commode, etc.)   ? 1   ? 2   ? 3   ? 4   3. Moving from lying on back to sitting on the side of the bed?   ? 1   ? 2   ? 3   ? 4   How much help from another person does the patient currently need. .. Total A Lot A Little None   4. Moving to and from a bed to a chair (including a wheelchair)? ? 1   ? 2   ? 3   ? 4   5. Need to walk in hospital room? ? 1   ? 2   ? 3   ? 4   6. Climbing 3-5 steps with a railing? ? 1   ? 2   ? 3   ? 4   © , Trustees of [facility] [employer], under license to [employer].  All rights reserved      Score:  Initial: 15 Most Recent: X (Date: -- )    Interpretation of Tool:  Represents activities that are increasingly more difficult (i.e. Bed mobility, Transfers, Gait). Medical Necessity:     · Patient demonstrates fair  ·  rehab potential due to higher previous functional level. Reason for Services/Other Comments:  · Patient continues to require skilled intervention due to decreased functional mobility, balance, and activity tolerance   · . Use of outcome tool(s) and clinical judgement create a POC that gives a: Questionable prediction of patient's progress: MODERATE COMPLEXITY            TREATMENT:   (In addition to Assessment/Re-Assessment sessions the following treatments were rendered)   Pre-treatment Symptoms/Complaints:  Lethargic  Pain: Initial:   Pain Intensity 1: 5  Pain Location 1: Back  Post Session:  5     Therapeutic Activity: (    ):  Therapeutic activities including Chair transfers, Ambulation on level ground and seated exercises  to improve mobility, strength, balance, coordination and activity tolerance . Required moderate   to promote static and dynamic balance in standing and promote coordination of bilateral, upper extremity(s), lower extremity(s). Date:  6/18/19 Date:   Date:     Activity/Exercise Parameters Parameters Parameters   APs 2 x 20 B     SAQ 1 x 15 B     Hip abd/add 1 x 10 B                               Therapeutic Exercise: (9 Minutes):  Exercises per grid below to improve mobility and strength. Required moderate visual, verbal and tactile cues to promote proper body alignment and promote proper body mechanics. Progressed repetitions as indicated.      Date:  6/19/19 Date:   Date:     Activity/Exercise Parameters Parameters Parameters   APs 2 x 20 B     SAQ 1 x 10 B     Heel slides 1 x 10 B     Hip abd/add 1 x 10 B                               Braces/Orthotics/Lines/Etc:   · zapata catheter  · O2 Device: Heated, Hi flow nasal cannula  Treatment/Session Assessment:    · Response to Treatment:  See above.  · Interdisciplinary Collaboration:   o Physical Therapist  o Registered Nurse  · After treatment position/precautions:   o Supine in bed  o Bed alarm/tab alert on  o Bed/Chair-wheels locked  o Bed in low position  o Call light within reach  o RN notified   · Compliance with Program/Exercises: Will assess as treatment progresses  · Recommendations/Intent for next treatment session: \"Next visit will focus on advancements to more challenging activities and reduction in assistance provided\".   Total Treatment Duration:  PT Patient Time In/Time Out  Time In: 3848  Time Out: [address] [facility] Lele PT, DPT

## 2019-06-19 NOTE — PROGRESS NOTES
Occupational Therapy Note:  OT orders received, chart reviewed, and evaluation attempted. Patient receiving bath at this time. Will check back as schedule permits and patient is available to initiate occupational therapy evaluation.    Thank you for this consult,   Priscilla Reyes, OTR/L

## 2019-06-20 LAB
ANION GAP SERPL CALC-SCNC: 12 MMOL/L (ref 7–16)
BUN SERPL-MCNC: 30 MG/DL (ref 8–23)
CALCIUM SERPL-MCNC: 9.6 MG/DL (ref 8.3–10.4)
CHLORIDE SERPL-SCNC: 101 MMOL/L (ref 98–107)
CO2 SERPL-SCNC: 25 MMOL/L (ref 21–32)
CREAT SERPL-MCNC: 0.68 MG/DL (ref 0.6–1)
ERYTHROCYTE [DISTWIDTH] IN BLOOD BY AUTOMATED COUNT: 15.9 % (ref 11.9–14.6)
GLUCOSE SERPL-MCNC: 134 MG/DL (ref 65–100)
HCT VFR BLD AUTO: 40.6 % (ref 35.8–46.3)
HGB BLD-MCNC: 12.8 G/DL (ref 11.7–15.4)
INR PPP: 5.9
MCH RBC QN AUTO: 30 PG (ref 26.1–32.9)
MCHC RBC AUTO-ENTMCNC: 31.5 G/DL (ref 31.4–35)
MCV RBC AUTO: 95.1 FL (ref 79.6–97.8)
MM INDURATION POC: NORMAL MM (ref 0–5)
NRBC # BLD: 0 K/UL (ref 0–0.2)
PLATELET # BLD AUTO: 217 K/UL (ref 150–450)
PMV BLD AUTO: 10.4 FL (ref 9.4–12.3)
POTASSIUM SERPL-SCNC: 4 MMOL/L (ref 3.5–5.1)
PPD POC: NEGATIVE NEGATIVE
PROTHROMBIN TIME: 51.9 SEC (ref 11.7–14.5)
RBC # BLD AUTO: 4.27 M/UL (ref 4.05–5.2)
SODIUM SERPL-SCNC: 138 MMOL/L (ref 136–145)
WBC # BLD AUTO: 5.5 K/UL (ref 4.3–11.1)

## 2019-06-20 PROCEDURE — 77010033678 HC OXYGEN DAILY

## 2019-06-20 PROCEDURE — 36415 COLL VENOUS BLD VENIPUNCTURE: CPT

## 2019-06-20 PROCEDURE — 74011000250 HC RX REV CODE- 250: Performed by: INTERNAL MEDICINE

## 2019-06-20 PROCEDURE — 74011250637 HC RX REV CODE- 250/637: Performed by: INTERNAL MEDICINE

## 2019-06-20 PROCEDURE — 65660000000 HC RM CCU STEPDOWN

## 2019-06-20 PROCEDURE — 94760 N-INVAS EAR/PLS OXIMETRY 1: CPT

## 2019-06-20 PROCEDURE — 94640 AIRWAY INHALATION TREATMENT: CPT

## 2019-06-20 PROCEDURE — 80048 BASIC METABOLIC PNL TOTAL CA: CPT

## 2019-06-20 PROCEDURE — 74011250636 HC RX REV CODE- 250/636: Performed by: INTERNAL MEDICINE

## 2019-06-20 PROCEDURE — 99232 SBSQ HOSP IP/OBS MODERATE 35: CPT | Performed by: INTERNAL MEDICINE

## 2019-06-20 PROCEDURE — 85610 PROTHROMBIN TIME: CPT

## 2019-06-20 PROCEDURE — 74011636637 HC RX REV CODE- 636/637: Performed by: INTERNAL MEDICINE

## 2019-06-20 PROCEDURE — 85027 COMPLETE CBC AUTOMATED: CPT

## 2019-06-20 RX ADMIN — PREDNISONE 40 MG: 10 TABLET ORAL at 08:54

## 2019-06-20 RX ADMIN — LEVALBUTEROL HYDROCHLORIDE 0.63 MG: 0.63 SOLUTION RESPIRATORY (INHALATION) at 13:59

## 2019-06-20 RX ADMIN — LEVOTHYROXINE SODIUM 125 MCG: 125 TABLET ORAL at 08:55

## 2019-06-20 RX ADMIN — BUDESONIDE 500 MCG: 0.5 INHALANT RESPIRATORY (INHALATION) at 20:31

## 2019-06-20 RX ADMIN — AMOXICILLIN AND CLAVULANATE POTASSIUM 600 MG: 600; 42.9 POWDER, FOR SUSPENSION ORAL at 21:59

## 2019-06-20 RX ADMIN — FUROSEMIDE 20 MG: 10 INJECTION, SOLUTION INTRAMUSCULAR; INTRAVENOUS at 08:55

## 2019-06-20 RX ADMIN — LEVALBUTEROL HYDROCHLORIDE 0.63 MG: 0.63 SOLUTION RESPIRATORY (INHALATION) at 07:27

## 2019-06-20 RX ADMIN — HYDROCODONE BITARTRATE AND ACETAMINOPHEN 1 TABLET: 5; 325 TABLET ORAL at 01:59

## 2019-06-20 RX ADMIN — LEVETIRACETAM 500 MG: 500 TABLET ORAL at 08:56

## 2019-06-20 RX ADMIN — Medication 10 ML: at 05:47

## 2019-06-20 RX ADMIN — Medication 10 ML: at 21:59

## 2019-06-20 RX ADMIN — FOLIC ACID 1 MG: 1 TABLET ORAL at 08:56

## 2019-06-20 RX ADMIN — LEVALBUTEROL HYDROCHLORIDE 0.63 MG: 0.63 SOLUTION RESPIRATORY (INHALATION) at 20:31

## 2019-06-20 RX ADMIN — AMOXICILLIN AND CLAVULANATE POTASSIUM 600 MG: 600; 42.9 POWDER, FOR SUSPENSION ORAL at 09:01

## 2019-06-20 RX ADMIN — POTASSIUM CHLORIDE 20 MEQ: 1.5 POWDER, FOR SOLUTION ORAL at 08:54

## 2019-06-20 RX ADMIN — VENLAFAXINE HYDROCHLORIDE 225 MG: 75 CAPSULE, EXTENDED RELEASE ORAL at 08:55

## 2019-06-20 RX ADMIN — SULFASALAZINE 1000 MG: 500 TABLET ORAL at 11:56

## 2019-06-20 RX ADMIN — HYDROXYCHLOROQUINE SULFATE 300 MG: 200 TABLET, FILM COATED ORAL at 08:56

## 2019-06-20 RX ADMIN — BUDESONIDE 500 MCG: 0.5 INHALANT RESPIRATORY (INHALATION) at 07:27

## 2019-06-20 RX ADMIN — PANTOPRAZOLE SODIUM 40 MG: 40 TABLET, DELAYED RELEASE ORAL at 08:56

## 2019-06-20 RX ADMIN — Medication 5 ML: at 13:14

## 2019-06-20 RX ADMIN — SULFASALAZINE 1000 MG: 500 TABLET ORAL at 08:56

## 2019-06-20 RX ADMIN — LEVALBUTEROL HYDROCHLORIDE 0.63 MG: 0.63 SOLUTION RESPIRATORY (INHALATION) at 01:12

## 2019-06-20 RX ADMIN — HYDROCODONE BITARTRATE AND ACETAMINOPHEN 1 TABLET: 5; 325 TABLET ORAL at 08:54

## 2019-06-20 RX ADMIN — HYDROCODONE BITARTRATE AND ACETAMINOPHEN 1 TABLET: 5; 325 TABLET ORAL at 21:59

## 2019-06-20 RX ADMIN — LEVETIRACETAM 500 MG: 500 TABLET ORAL at 17:19

## 2019-06-20 RX ADMIN — SULFASALAZINE 1000 MG: 500 TABLET ORAL at 17:19

## 2019-06-20 RX ADMIN — FAMOTIDINE 20 MG: 20 TABLET ORAL at 17:19

## 2019-06-20 NOTE — PROGRESS NOTES
Warfarin dosing per pharmacist    Sara Huerta is a 68 y.o. female. Weight: 63.5 kg (139 lb 14.4 oz)    Indication:  AVR    Goal INR:  2.5-3.5 per consult; 2-3 per outpatient anticoag notes    Home dose:  2 mg M, F; 4 mg all other days    Risk factors or significant drug interactions:  none    Other anticoagulants:  none    Daily Monitoring  Date  INR     Warfarin dose HGB              Notes  6/16  >6.8  Hold  11.9  6/17  >6.8  Hold  11.6   6/18  6.5  Hold  ---  6/19  5.5  Hold  11.6    6/20  5.9  Hold  12.8     Pharmacy consulted to manage warfarin for Ms. Jacinda Lagunas during this admission. She presented with supratherapeutic INR and warfarin has been held since admission. Continue to hold warfarin until INR trends down toward therapeutic range. Daily INR. Pharmacy will continue to follow. Please call with any questions. Thank you,  Kait Dean.  Sadia Perry, PharmD, [address] [facility]  Clinical Pharmacist  [phone]

## 2019-06-20 NOTE — PROGRESS NOTES
Care Management Interventions  Mode of Transport at Discharge: BLS  Transition of Care Consult (CM Consult): Discharge Planning, Home Hospice(vs Hospice House)  Discharge Durable Medical Equipment: No  Physical Therapy Consult: Yes  Occupational Therapy Consult: Yes  Current Support Network: Assisted Living  Plan discussed with Pt/Family/Caregiver: No  Discharge Location  Discharge Placement: Home with hospice    CM spoke with GIN Cosme, pt can return with [facility]. Jennifer - hospice rep will accept pt in the AM. DME to be delivered in the morning, for potential dc tomorrow. Referral and order given to [facility]. Pt will require transport via [email or web address]. Pt & family in agreement with dcp. CM to continue to follow for dc needs.

## 2019-06-20 NOTE — PROGRESS NOTES
Verbal bedside report given to Betty Zamudio oncoming RN. Patient's situation, background, assessment and recommendations provided. Opportunity for questions provided. Oncoming RN assumed care of patient.

## 2019-06-20 NOTE — PROGRESS NOTES
Kate Look  Admission Date: 6/17/2019             Daily Progress Note: 6/20/2019    The patient's chart is reviewed and the patient is discussed with the staff. Patient is a 68 y.o.  female seen and evaluated at the request of Dr. Kari Elias. Pt has a history of mixed connective tissue disease on Reclast, sulfasalazine, methotrexate, plaquenil, and prednisone 5mg daily, mechanical aortic valve on chronic coumadin, dementia, and chronic pain. She resides at [facility] and was recently treated for pneumonia. She presented to the ER at [facility] on 6/16/19 with acute respiratory failure with hypoxemia. She was admitted and we were consulted to assist. She was initially treated for pneumonia but her BNP was 818 this AM and abx discontinued and she was given IV lasix early this AM.   Pt lying in bed on 40L at 65%. Pt only awakes briefly and moans. She was unable to answer any questions. Per report, at [facility], she is alert and able to converse. PCO2 this AM was 32. She is unable to assist with any history. Subjective:    Weaned to 3lpm  Reports dyspnea  Fluid balance neg 291ml yesterday, with stable creatinine      Current Facility-Administered Medications   Medication Dose Route Frequency    acetaminophen (TYLENOL) tablet 500 mg  500 mg Oral Q6H PRN    furosemide (LASIX) injection 20 mg  20 mg IntraVENous DAILY    amoxicillin-clavulanate (AUGMENTIN) 600-42.9 mg/5 mL oral suspension 600 mg  600 mg Oral Q12H    predniSONE (DELTASONE) tablet 40 mg  40 mg Oral DAILY WITH BREAKFAST    potassium chloride (KLOR-CON) packet for solution 20 mEq  20 mEq Oral DAILY    levalbuterol (XOPENEX) nebulizer soln 0.63 mg/3 mL  0.63 mg Nebulization Q6H RT    budesonide (PULMICORT) 500 mcg/2 ml nebulizer suspension  500 mcg Nebulization BID RT    sodium chloride (NS) flush 5-40 mL  5-40 mL IntraVENous Q8H    sodium chloride (NS) flush 5-40 mL  5-40 mL IntraVENous PRN    albuterol (PROVENTIL VENTOLIN) nebulizer solution 2.5 mg  2.5 mg Nebulization Q6H PRN    dicyclomine (BENTYL) capsule 10 mg  10 mg Oral DAILY PRN    famotidine (PEPCID) tablet 20 mg  20 mg Oral QPM    folic acid (FOLVITE) tablet 1 mg  1 mg Oral DAILY    HYDROcodone-acetaminophen (NORCO) 5-325 mg per tablet 1 Tab  1 Tab Oral TID PRN    hydroxychloroquine (PLAQUENIL) tablet 300 mg  300 mg Oral DAILY    levETIRAcetam (KEPPRA) tablet 500 mg  500 mg Oral BID    levothyroxine (SYNTHROID) tablet 125 mcg  125 mcg Oral ACB    nitroglycerin (NITROSTAT) tablet 0.4 mg  0.4 mg SubLINGual Q5MIN PRN    pantoprazole (PROTONIX) tablet 40 mg  40 mg Oral ACB    polyvinyl alcohol (LIQUIFILM TEARS) 1.4 % ophthalmic solution 1 Drop  1 Drop Both Eyes PRN    sulfaSALAzine (AZULFIDINE) tablet 1,000 mg  1,000 mg Oral TID WITH MEALS    venlafaxine-SR (EFFEXOR-XR) capsule 225 mg  225 mg Oral DAILY WITH BREAKFAST    Warfarin on hold - Rx dosing   Other QPM       Review of Systems    Constitutional: negative for fever, chills, sweats  Cardiovascular: negative for chest pain, palpitations, syncope, edema  Gastrointestinal:  negative for dysphagia, reflux, vomiting, diarrhea, abdominal pain, or melena  Neurologic:  negative for focal weakness, numbness, headache    Objective:     Vitals:    06/20/19 0114 06/20/19 0519 06/20/19 0727 06/20/19 0757   BP:  107/67  117/75   Pulse:  76  (!) 114   Resp:  18  18   Temp:  97.6 °F (36.4 °C)  97.4 °F (36.3 °C)   SpO2: 98% 100% 93% 94%   Weight:  139 lb 14.4 oz (63.5 kg)       Intake and Output:   06/18 1901 - 06/20 0700  In: 670 [P.O.:670]  Out: 1261 [Urine:1260]  06/20 0701 - 06/20 1900  In: 120 [P.O.:120]  Out: -     Physical Exam:   Constitution:  the patient is well developed and in no acute distress  EENMT:  Sclera clear, pupils equal, oral mucosa moist  Respiratory: crackles more on right than left base  Cardiovascular:  RRR with mechanical valve with crisp sounds  Gastrointestinal: soft and non-tender; with positive bowel sounds. Musculoskeletal: warm without cyanosis. There is plus 1 lower extremity edema. Skin:  no jaundice or rashes, no wounds   Neurologic: no gross neuro deficits     Psychiatric:  Awake     CXR:                Recent Labs     06/20/19  0500 06/19/19  0545 06/18/19  0616   WBC 5.5 7.0 6.4   HGB 12.8 11.6* 11.7   HCT 40.6 36.5 36.7    161 157   INR 5.9* 5.5* 6.5*     Recent Labs     06/20/19  0500 06/19/19  0545 06/18/19  0616 06/17/19  1628    138 136  --    K 4.0 3.5 3.3*  --     101 99  --    CO2 25 27 27  --    * 102* 125*  --    BUN 30* 25* 23  --    CREA 0.68 0.52* 0.61  --    CA 9.6 9.2 8.8  --    TROIQ  --   --   --  16.20*     No results for input(s): PH, PCO2, PO2, HCO3, PHI, PCO2I, PO2I, HCO3I in the last 72 hours. Assessment:  (Medical Decision Making)     Hospital Problems  Date Reviewed: 6/17/2019          Codes Class Noted POA    Acute systolic congestive heart failure ([facility] ICD-10-CM: I50.21  ICD-9-CM: 428.21, 428.0  6/17/2019 Yes    On Lasix, per cardiology      Elevated troponin ICD-10-CM: R74.8  ICD-9-CM: 790.6  6/17/2019 Yes    Per card they think this is demand ischemia rather than acute plaque rupture.       * (Principal) Acute respiratory failure with hypoxia ([facility]     on optiflow , most likely related to cardiomyopathy, CHF ICD-10-CM: J96.01  ICD-9-CM: 518.81  6/16/2019 Yes        Immunosuppressed status ([facility] ICD-10-CM: D89.9  ICD-9-CM: 279.9  6/20/2014 Yes    On steroids    HTN (hypertension)--Dr. Homero Kasper ICD-10-CM: I10  ICD-9-CM: 401.9  1/17/2013 Yes        GERD (gastroesophageal reflux disease) ICD-10-CM: K21.9  ICD-9-CM: 530.81  1/17/2013 Yes        Chronic pain (Chronic) ICD-10-CM: R41.01  ICD-9-CM: 338.29  2/4/2010 Yes        S/P AVR (aortic valve replacement) (Chronic) ICD-10-CM: Z95.2  ICD-9-CM: V43.3  2/4/2010 Yes              Plan:  (Medical Decision Making)     - continue diuresis with target of net negative fluid balance now that BP more stable. - wean nasal cannula as able. Has PFO with h/o R to L shunt. --remains on augmentin with recent pneumonia, immunocompromise. More than 50% of the time documented was spent in face-to-face contact with the patient and in the care of the patient on the floor/unit where the patient is located.     Barby Matthews MD

## 2019-06-20 NOTE — PROGRESS NOTES
Problem: Pressure Injury - Risk of  Goal: *Prevention of pressure injury  Description  Document Shadi Scale and appropriate interventions in the flowsheet. Outcome: Progressing Towards Goal  Note:   Pressure Injury Interventions:  Sensory Interventions: Assess changes in LOC, Avoid rigorous massage over bony prominences, Float heels, Keep linens dry and wrinkle-free, Maintain/enhance activity level, Minimize linen layers    Moisture Interventions: Absorbent underpads, Apply protective barrier, creams and emollients, Limit adult briefs, Minimize layers    Activity Interventions: PT/OT evaluation, Pressure redistribution bed/mattress(bed type)    Mobility Interventions: HOB 30 degrees or less, Pressure redistribution bed/mattress (bed type), PT/OT evaluation    Nutrition Interventions: Document food/fluid/supplement intake    Friction and Shear Interventions: Foam dressings/transparent film/skin sealants, HOB 30 degrees or less, Lift team/patient mobility team, Minimize layers    Problem: Falls - Risk of  Goal: *Absence of Falls  Description  Document Agusto Fall Risk and appropriate interventions in the flowsheet.   Outcome: Progressing Towards Goal

## 2019-06-20 NOTE — PROGRESS NOTES
[facility] CARDIOLOGY PROGRESS NOTE           6/20/2019 12:24 PM    Admit Date: 6/17/2019         Subjective: denies SOB or CP    ROS:  Cardiovascular:  As noted above    Objective:      Vitals:    06/20/19 0114 06/20/19 0519 06/20/19 0727 06/20/19 0757   BP:  107/67  117/75   Pulse:  76  (!) 114   Resp:  18  18   Temp:  97.6 °F (36.4 °C)  97.4 °F (36.3 °C)   SpO2: 98% 100% 93% 94%   Weight:  63.5 kg (139 lb 14.4 oz)           Physical Exam:  General: Well Developed, Well Nourished, No Acute Distress, Alert & Oriented x 3, Appropriate mood  Neck: supple, no JVD  Heart: S1S2 with RRR without murmurs or gallops  Lungs: Clear throughout auscultation bilaterally without adventitious sounds  Abd: soft, nontender, nondistended, with good bowel sounds  Ext: no edema bilaterally  Skin: warm and dry      Data Review:   Recent Labs     06/20/19  0500 06/19/19  0545    138   K 4.0 3.5   BUN 30* 25*   CREA 0.68 0.52*   * 102*   WBC 5.5 7.0   HGB 12.8 11.6*   HCT 40.6 36.5    161   INR 5.9* 5.5*       Recent Labs     06/17/19  1628   TROIQ 16.20*       Assessment/Plan:     Principal Problem:    Acute respiratory failure with hypoxia (HCC) (6/16/2019)    Active Problems:    Chronic pain (2/4/2010)      S/P AVR (aortic valve replacement) (2/4/2010)      HTN (hypertension)--Dr. Daphne Rae (1/17/2013)      GERD (gastroesophageal reflux disease) (1/17/2013)      Immunosuppressed status ([facility] (6/20/2014)      Acute systolic congestive heart failure ([facility] (6/17/2019)      Elevated troponin (6/17/2019)    A/P  1) sCHF - stress induced, continue net neg fluid balance with iv Lasix, holding HF meds with low BP  2) HTN - BP is currently low  3) Elevated troponin likely stress induce cardiomyopathy  4) Resp dis/hypoxia - per primary team      Saray Shanks MD  6/20/2019 12:24 PM

## 2019-06-20 NOTE — PROGRESS NOTES
Progress Note    Patient: Amy Clifton MRN: [id number]  SSN: [id number]    YOB: 1945  Age: 68 y.o. Sex: female      Admit Date: 6/17/2019    LOS: 3 days     Subjective:     Patient was seen at bedside. Alert. Clinically better. will continue Augmentin and increased dose of steroids for now. On IV lasix. Case discussed with her POA. They agreed with hospice services at discharge. Tachycardic at times. Objective:     Vitals:    06/20/19 0757 06/20/19 1236 06/20/19 1359 06/20/19 1653   BP: 117/75 110/65  107/72   Pulse: (!) 114 (!) 122  (!) 124   Resp: 18 18 19   Temp: 97.4 °F (36.3 °C) 98.6 °F (37 °C)  97.5 °F (36.4 °C)   SpO2: 94% 96% 95% 92%   Weight:            Intake and Output:  Current Shift: 06/20 0701 - 06/20 1900  In: 120 [P.O.:120]  Out: 350 [Urine:350]  Last three shifts: 06/18 1901 - 06/20 0700  In: 670 [P.O.:670]  Out: 1261 [Urine:1260]    Physical Exam:   GENERAL: alert, appears stated age  EYE: conjunctivae/corneas clear. LYMPHATIC: Cervical, supraclavicular, and axillary nodes normal.   THROAT & NECK: normal and no erythema or exudates noted. LUNG: no wheezing. Decreased breath sounds in both lung bases   HEART: regular rate and rhythm, S1, S2 normal, no murmur, click, rub or gallop  ABDOMEN: soft, non-tender. Bowel sounds normal. No masses,  no organomegaly  EXTREMITIES:  extremities normal, atraumatic, no cyanosis or edema  SKIN: Normal.  NEUROLOGIC: No focal deficits   PSYCHIATRIC: non focal    Lab/Data Review: All lab results for the last 24 hours reviewed.          Assessment:     Principal Problem:    Acute respiratory failure with hypoxia ([address] (6/16/2019)    Active Problems:    Chronic pain (2/4/2010)      S/P AVR (aortic valve replacement) (2/4/2010)      HTN (hypertension)--Dr. Surekha Lechuga (1/17/2013)      GERD (gastroesophageal reflux disease) (1/17/2013)      Immunosuppressed status ([facility] (6/20/2014)      Acute systolic congestive heart failure ([facility] (6/17/2019)      Elevated troponin (6/17/2019)        Plan:     - continue immunosuppressive drugs   -continue IV lasix   -Clinically improving.  Continue Augmentin   -continue prednisone 40 mg po daily   -monitor clinical progress   -will go back to there [facility] with hospice services at discharge     Signed By: Rodolfo Hamilton MD     June 20, 2019

## 2019-06-20 NOTE — PROGRESS NOTES
MEWS score noted to be 4. Charge nurse, Kerri Farnsworth RN informed and assessed patient. High MEWS score noted due to HR and respirations. Vitals signs to be completed every 2 hours. Will continue to monitor. 6/20/19 0051: MEWS score is noted to be 2 at this time.

## 2019-06-21 ENCOUNTER — HOSPICE ADMISSION (OUTPATIENT)
Dept: HOSPICE | Facility: HOSPICE | Age: 74
End: 2019-06-21

## 2019-06-21 VITALS
TEMPERATURE: 97.4 F | OXYGEN SATURATION: 96 % | DIASTOLIC BLOOD PRESSURE: 57 MMHG | HEART RATE: 95 BPM | BODY MASS INDEX: 24.13 KG/M2 | WEIGHT: 145 LBS | SYSTOLIC BLOOD PRESSURE: 94 MMHG | RESPIRATION RATE: 20 BRPM

## 2019-06-21 LAB
BACTERIA SPEC CULT: NORMAL
BACTERIA SPEC CULT: NORMAL
INR PPP: 3.2
INR PPP: 9.7
PROTHROMBIN TIME: 32 SEC (ref 11.7–14.5)
PROTHROMBIN TIME: 76.4 SEC (ref 11.7–14.5)
SERVICE CMNT-IMP: NORMAL
SERVICE CMNT-IMP: NORMAL

## 2019-06-21 PROCEDURE — 85610 PROTHROMBIN TIME: CPT

## 2019-06-21 PROCEDURE — 77010033678 HC OXYGEN DAILY

## 2019-06-21 PROCEDURE — 74011250636 HC RX REV CODE- 250/636: Performed by: INTERNAL MEDICINE

## 2019-06-21 PROCEDURE — 36415 COLL VENOUS BLD VENIPUNCTURE: CPT

## 2019-06-21 PROCEDURE — 94640 AIRWAY INHALATION TREATMENT: CPT

## 2019-06-21 PROCEDURE — 74011250637 HC RX REV CODE- 250/637: Performed by: INTERNAL MEDICINE

## 2019-06-21 PROCEDURE — 74011636637 HC RX REV CODE- 636/637: Performed by: INTERNAL MEDICINE

## 2019-06-21 PROCEDURE — 94760 N-INVAS EAR/PLS OXIMETRY 1: CPT

## 2019-06-21 PROCEDURE — 74011250637 HC RX REV CODE- 250/637: Performed by: FAMILY MEDICINE

## 2019-06-21 PROCEDURE — 99232 SBSQ HOSP IP/OBS MODERATE 35: CPT | Performed by: INTERNAL MEDICINE

## 2019-06-21 PROCEDURE — 74011000250 HC RX REV CODE- 250: Performed by: INTERNAL MEDICINE

## 2019-06-21 RX ORDER — BUDESONIDE 0.5 MG/2ML
500 INHALANT ORAL 2 TIMES DAILY
Qty: 14 EACH | Refills: 0 | Status: SHIPPED | OUTPATIENT
Start: 2019-06-21 | End: 2019-06-28

## 2019-06-21 RX ORDER — AMOXICILLIN AND CLAVULANATE POTASSIUM 600; 42.9 MG/5ML; MG/5ML
5 POWDER, FOR SUSPENSION ORAL EVERY 12 HOURS
Qty: 50 ML | Refills: 0 | Status: SHIPPED | OUTPATIENT
Start: 2019-06-21 | End: 2019-06-26

## 2019-06-21 RX ORDER — ONDANSETRON 4 MG/1
4 TABLET, FILM COATED ORAL
Qty: 60 TAB | Refills: 0 | Status: SHIPPED
Start: 2019-06-21

## 2019-06-21 RX ORDER — PREDNISONE 5 MG/1
TABLET ORAL
Qty: 30 TAB | Refills: 0 | Status: SHIPPED
Start: 2019-06-21

## 2019-06-21 RX ORDER — WARFARIN 1 MG/1
TABLET ORAL
Qty: 5 TAB | Refills: 0 | Status: SHIPPED | OUTPATIENT
Start: 2019-06-21

## 2019-06-21 RX ORDER — LANOLIN ALCOHOL/MO/W.PET/CERES
1000 CREAM (GRAM) TOPICAL DAILY
Qty: 30 TAB | Refills: 0 | Status: SHIPPED
Start: 2019-06-21

## 2019-06-21 RX ORDER — CARVEDILOL 3.12 MG/1
3.12 TABLET ORAL 2 TIMES DAILY WITH MEALS
Status: DISCONTINUED | OUTPATIENT
Start: 2019-06-21 | End: 2019-06-21 | Stop reason: HOSPADM

## 2019-06-21 RX ORDER — ACETAMINOPHEN 500 MG
500 TABLET ORAL
Qty: 30 TAB | Refills: 0 | Status: SHIPPED
Start: 2019-06-21

## 2019-06-21 RX ORDER — FUROSEMIDE 20 MG/1
20 TABLET ORAL DAILY
Qty: 30 TAB | Refills: 0 | Status: SHIPPED
Start: 2019-06-21

## 2019-06-21 RX ORDER — ALBUTEROL SULFATE 0.83 MG/ML
2.5 SOLUTION RESPIRATORY (INHALATION)
Qty: 30 NEBULE | Refills: 0 | Status: SHIPPED | OUTPATIENT
Start: 2019-06-21

## 2019-06-21 RX ORDER — POTASSIUM CHLORIDE 1.5 G/1.77G
10 POWDER, FOR SOLUTION ORAL DAILY
Qty: 15 PACKET | Refills: 0 | Status: SHIPPED | OUTPATIENT
Start: 2019-06-22

## 2019-06-21 RX ORDER — PHYTONADIONE 5 MG/1
2.5 TABLET ORAL
Status: COMPLETED | OUTPATIENT
Start: 2019-06-21 | End: 2019-06-21

## 2019-06-21 RX ORDER — NYSTATIN 100000 [USP'U]/ML
500000 SUSPENSION ORAL 4 TIMES DAILY
Status: DISCONTINUED | OUTPATIENT
Start: 2019-06-21 | End: 2019-06-21 | Stop reason: HOSPADM

## 2019-06-21 RX ORDER — NYSTATIN 100000 [USP'U]/ML
500000 SUSPENSION ORAL 4 TIMES DAILY
Qty: 140 ML | Refills: 0 | Status: SHIPPED | OUTPATIENT
Start: 2019-06-21 | End: 2019-06-28

## 2019-06-21 RX ORDER — CARVEDILOL 3.12 MG/1
3.12 TABLET ORAL 2 TIMES DAILY WITH MEALS
Qty: 60 TAB | Refills: 0 | Status: SHIPPED
Start: 2019-06-22

## 2019-06-21 RX ORDER — PREDNISONE 20 MG/1
TABLET ORAL
Qty: 11 TAB | Refills: 0 | Status: SHIPPED | OUTPATIENT
Start: 2019-06-21

## 2019-06-21 RX ORDER — HYDROCODONE BITARTRATE AND ACETAMINOPHEN 5; 325 MG/1; MG/1
1 TABLET ORAL
Qty: 10 TAB | Refills: 0 | Status: SHIPPED | OUTPATIENT
Start: 2019-06-21 | End: 2019-06-24

## 2019-06-21 RX ORDER — DIPHENOXYLATE HYDROCHLORIDE AND ATROPINE SULFATE 2.5; .025 MG/1; MG/1
1 TABLET ORAL
Qty: 30 TAB | Refills: 0 | Status: SHIPPED | OUTPATIENT
Start: 2019-06-21

## 2019-06-21 RX ADMIN — SULFASALAZINE 1000 MG: 500 TABLET ORAL at 11:54

## 2019-06-21 RX ADMIN — CARVEDILOL 3.12 MG: 3.12 TABLET, FILM COATED ORAL at 16:42

## 2019-06-21 RX ADMIN — AMOXICILLIN AND CLAVULANATE POTASSIUM 600 MG: 600; 42.9 POWDER, FOR SUSPENSION ORAL at 11:54

## 2019-06-21 RX ADMIN — LEVALBUTEROL HYDROCHLORIDE 0.63 MG: 0.63 SOLUTION RESPIRATORY (INHALATION) at 07:39

## 2019-06-21 RX ADMIN — POTASSIUM CHLORIDE 20 MEQ: 1.5 POWDER, FOR SOLUTION ORAL at 08:28

## 2019-06-21 RX ADMIN — LEVETIRACETAM 500 MG: 500 TABLET ORAL at 08:29

## 2019-06-21 RX ADMIN — NYSTATIN 500000 UNITS: 100000 SUSPENSION ORAL at 11:54

## 2019-06-21 RX ADMIN — Medication 10 ML: at 05:33

## 2019-06-21 RX ADMIN — HYDROXYCHLOROQUINE SULFATE 300 MG: 200 TABLET, FILM COATED ORAL at 08:29

## 2019-06-21 RX ADMIN — PREDNISONE 40 MG: 10 TABLET ORAL at 08:28

## 2019-06-21 RX ADMIN — HYDROCODONE BITARTRATE AND ACETAMINOPHEN 1 TABLET: 5; 325 TABLET ORAL at 12:52

## 2019-06-21 RX ADMIN — Medication 5 ML: at 14:15

## 2019-06-21 RX ADMIN — FOLIC ACID 1 MG: 1 TABLET ORAL at 08:28

## 2019-06-21 RX ADMIN — LEVOTHYROXINE SODIUM 125 MCG: 125 TABLET ORAL at 08:29

## 2019-06-21 RX ADMIN — PANTOPRAZOLE SODIUM 40 MG: 40 TABLET, DELAYED RELEASE ORAL at 08:28

## 2019-06-21 RX ADMIN — SULFASALAZINE 1000 MG: 500 TABLET ORAL at 08:28

## 2019-06-21 RX ADMIN — VENLAFAXINE HYDROCHLORIDE 225 MG: 75 CAPSULE, EXTENDED RELEASE ORAL at 08:29

## 2019-06-21 RX ADMIN — Medication 1 EACH: at 05:33

## 2019-06-21 RX ADMIN — SULFASALAZINE 1000 MG: 500 TABLET ORAL at 16:42

## 2019-06-21 RX ADMIN — LEVETIRACETAM 500 MG: 500 TABLET ORAL at 16:42

## 2019-06-21 RX ADMIN — NYSTATIN 500000 UNITS: 100000 SUSPENSION ORAL at 16:42

## 2019-06-21 RX ADMIN — CARVEDILOL 3.12 MG: 3.12 TABLET, FILM COATED ORAL at 08:29

## 2019-06-21 RX ADMIN — LEVALBUTEROL HYDROCHLORIDE 0.63 MG: 0.63 SOLUTION RESPIRATORY (INHALATION) at 02:21

## 2019-06-21 RX ADMIN — BUDESONIDE 500 MCG: 0.5 INHALANT RESPIRATORY (INHALATION) at 07:39

## 2019-06-21 RX ADMIN — FAMOTIDINE 20 MG: 20 TABLET ORAL at 16:42

## 2019-06-21 RX ADMIN — LEVALBUTEROL HYDROCHLORIDE 0.63 MG: 0.63 SOLUTION RESPIRATORY (INHALATION) at 13:59

## 2019-06-21 RX ADMIN — PHYTONADIONE 2.5 MG: 5 TABLET ORAL at 08:38

## 2019-06-21 RX ADMIN — FUROSEMIDE 20 MG: 10 INJECTION, SOLUTION INTRAMUSCULAR; INTRAVENOUS at 08:28

## 2019-06-21 NOTE — PROGRESS NOTES
[facility] CARDIOLOGY PROGRESS NOTE           6/21/2019 3:27 PM    Admit Date: 6/17/2019         Subjective: denies SOB or CP      ROS:  Cardiovascular:  As noted above    Objective:      Vitals:    06/21/19 0740 06/21/19 0759 06/21/19 1144 06/21/19 1359   BP:  109/75 98/75    Pulse:  (!) 59 (!) 105    Resp:  19 20    Temp:  97.6 °F (36.4 °C) 97.3 °F (36.3 °C)    SpO2: 91% 95% 94% 93%   Weight:           Physical Exam:  General: Well Developed, Well Nourished, No Acute Distress  Neck: supple, no JVD  Heart: S1S2 with RRR without murmurs or gallops  Lungs: Clear throughout auscultation bilaterally without adventitious sounds  Abd: soft, nontender, nondistended, with good bowel sounds  Ext: no edema bilaterally  Skin: warm and dry      Data Review:   Recent Labs     06/21/19  1432 06/21/19  0404 06/20/19  0500 06/19/19  0545   NA  --   --  138 138   K  --   --  4.0 3.5   BUN  --   --  30* 25*   CREA  --   --  0.68 0.52*   GLU  --   --  134* 102*   WBC  --   --  5.5 7.0   HGB  --   --  12.8 11.6*   HCT  --   --  40.6 36.5   PLT  --   --  217 161   INR 3.2 9.7* 5.9* 5.5*       No results for input(s): TNIPOC, TROIQ in the last 72 hours.       Assessment/Plan:     Principal Problem:    Acute respiratory failure with hypoxia ([facility] (6/16/2019)    Active Problems:    Chronic pain (2/4/2010)      S/P AVR (aortic valve replacement) (2/4/2010)      HTN (hypertension)--Dr. Susu Brothers (1/17/2013)      GERD (gastroesophageal reflux disease) (1/17/2013)      Immunosuppressed status ([facility] (6/20/2014)      Acute systolic congestive heart failure ([facility] (6/17/2019)      Elevated troponin (6/17/2019)    A/P  1) AVR (mechanical)- warfarin  2) stress induce cardiomyopathy - start low dose BB today  3) PNA - per pulm team  4) Hx of PFO with R to L shunt unlikely that it is contributing to hypoxia    Kristina Ann MD  6/21/2019 3:27 PM

## 2019-06-21 NOTE — PROGRESS NOTES
Critical INR of 9.7. Alia Velásquez MD paged. MD stated she would look over the patients chart and get back to the RN (me) or pass on to the day shift MD staff. Will pass this information on to the day shift nurse.

## 2019-06-21 NOTE — PROGRESS NOTES
CHF teaching started post introduction to pt/detention; aware of diagnosis. Planner/scale @ BS and will follow. Smoking/ ETOH/Illicit drug use cessation and maintain a healthy weight covered. Pt/Miguelmily aware that I can not prescribe nor adjust  medications: 15mins  Palliative Care score: seen  Refused ACP on admission  Start 2L/D Fluid restriction/ cardiac diet    CHF teaching completed, verbalize emphasis on monitoring self and report to MD:   If you gain 2 lbs in one day or 5 lbs in a week, and short of breath.  If you can not lay flat without developing short of breath or rapid breathing at night; or if it wakes you up. Develop a cough or wheezing.  If you notice swollen hands/feet/ankles or stomach with a bloated/ full feeling.  If you are  more confused or mentally fuzzy or dizzy.  If you notice a rapid or change in your heart rate.  If you become more exhausted all the time and unable to do the same level of activity without stopping to catch your breath. Drink no more than 8 cups a day in 8 oz. cups. Limit Cola Drinks. Your Heart can not handle any more. Stay away from salt (limit anything with salt or sodium in it). Limit to 250mg per serving. Exercise needs to be started with your Doctors approval.  Reduce stress; Call myself or Provider if assistance is needed. Pass post test via teach back, will make self available post DC ,if an questions arise. Diabetic teaching completed.  Planner @ BS:  60 mins total    Hospice/ detention

## 2019-06-21 NOTE — PROGRESS NOTES
Warfarin dosing per pharmacist    Sara Huerta is a 68 y.o. female. Weight: 65.8 kg (145 lb)    Indication:  AVR    Goal INR:  2.5-3.5 per consult; 2-3 per outpatient anticoag notes    Home dose:  2 mg M, F; 4 mg all other days    Risk factors or significant drug interactions:  none    Other anticoagulants:  none    Daily Monitoring  Date  INR     Warfarin dose HGB              Notes  6/16  >6.8  Hold  11.9  6/17  >6.8  Hold  11.6   6/18  6.5  Hold  ---  6/19  5.5  Hold  11.6    6/20  5.9  Hold  12.8  6/21  9.7  Hold  ---  Vit k 2.5 mg PO     Pharmacy consulted to manage warfarin for Ms. Jacinda Lagunas during this admission. She presented with supratherapeutic INR and warfarin has been held since admission. Continue to hold warfarin until INR trends down toward therapeutic range. Vitamin K 2.5 mg PO given 6/21. Daily INR. Pharmacy will continue to follow. Please call with any questions.     Thank you,  Luly Banks, PharmD  Clinical Pharmacist  [phone]

## 2019-06-21 NOTE — DISCHARGE INSTRUCTIONS
Pneumonia: Care Instructions  Your Care Instructions    Pneumonia is an infection of the lungs. Most cases are caused by infections from bacteria or viruses. Pneumonia may be mild or very severe. If it is caused by bacteria, you will be treated with antibiotics. It may take a few weeks to a few months to recover fully from pneumonia, depending on how sick you were and whether your overall health is good. Follow-up care is a key part of your treatment and safety. Be sure to make and go to all appointments, and call your doctor if you are having problems. It's also a good idea to know your test results and keep a list of the medicines you take. How can you care for yourself at home? · Take your antibiotics exactly as directed. Do not stop taking the medicine just because you are feeling better. You need to take the full course of antibiotics. · Take your medicines exactly as prescribed. Call your doctor if you think you are having a problem with your medicine. · Get plenty of rest and sleep. You may feel weak and tired for a while, but your energy level will improve with time. · To prevent dehydration, drink plenty of fluids, enough so that your urine is light yellow or clear like water. Choose water and other caffeine-free clear liquids until you feel better. If you have kidney, heart, or liver disease and have to limit fluids, talk with your doctor before you increase the amount of fluids you drink. · Take care of your cough so you can rest. A cough that brings up mucus from your lungs is common with pneumonia. It is one way your body gets rid of the infection. But if coughing keeps you from resting or causes severe fatigue and chest-wall pain, talk to your doctor. He or she may suggest that you take a medicine to reduce the cough. · Use a vaporizer or humidifier to add moisture to your bedroom. Follow the directions for cleaning the machine. · Do not smoke or allow others to smoke around you.  Smoke will make your cough last longer. If you need help quitting, talk to your doctor about stop-smoking programs and medicines. These can increase your chances of quitting for good. · Take an over-the-counter pain medicine, such as acetaminophen (Tylenol), ibuprofen (Advil, Motrin), or naproxen (Aleve). Read and follow all instructions on the label. · Do not take two or more pain medicines at the same time unless the doctor told you to. Many pain medicines have acetaminophen, which is Tylenol. Too much acetaminophen (Tylenol) can be harmful. · If you were given a spirometer to measure how well your lungs are working, use it as instructed. This can help your doctor tell how your recovery is going. · To prevent pneumonia in the future, talk to your doctor about getting a flu vaccine (once a year) and a pneumococcal vaccine (one time only for most people). When should you call for help? Call 911 anytime you think you may need emergency care. For example, call if:    · You have severe trouble breathing.    Call your doctor now or seek immediate medical care if:    · You cough up dark brown or bloody mucus (sputum).     · You have new or worse trouble breathing.     · You are dizzy or lightheaded, or you feel like you may faint.    Watch closely for changes in your health, and be sure to contact your doctor if:    · You have a new or higher fever.     · You are coughing more deeply or more often.     · You are not getting better after 2 days (48 hours).     · You do not get better as expected. Where can you learn more? Go to http://bety-mason.info/. Enter [id number] in the search box to learn more about \"Pneumonia: Care Instructions. \"  Current as of: September 5, 2018  Content Version: 11.9  © 1712-2780 [employer]. Care instructions adapted under license by [employer] (which disclaims liability or warranty for this information).  If you have questions about a medical condition or this instruction, always ask your healthcare professional. [place] [address] any warranty or liability for your use of this information. Urinary Tract Infection in Women: Care Instructions  Your Care Instructions    A urinary tract infection, or UTI, is a general term for an infection anywhere between the kidneys and the urethra (where urine comes out). Most UTIs are bladder infections. They often cause pain or burning when you urinate. UTIs are caused by bacteria and can be cured with antibiotics. Be sure to complete your treatment so that the infection goes away. Follow-up care is a key part of your treatment and safety. Be sure to make and go to all appointments, and call your doctor if you are having problems. It's also a good idea to know your test results and keep a list of the medicines you take. How can you care for yourself at home? · Take your antibiotics as directed. Do not stop taking them just because you feel better. You need to take the full course of antibiotics. · Drink extra water and other fluids for the next day or two. This may help wash out the bacteria that are causing the infection. (If you have kidney, heart, or liver disease and have to limit fluids, talk with your doctor before you increase your fluid intake.)  · Avoid drinks that are carbonated or have caffeine. They can irritate the bladder. · Urinate often. Try to empty your bladder each time. · To relieve pain, take a hot bath or lay a heating pad set on low over your lower belly or genital area. Never go to sleep with a heating pad in place. To prevent UTIs  · Drink plenty of water each day. This helps you urinate often, which clears bacteria from your system. (If you have kidney, heart, or liver disease and have to limit fluids, talk with your doctor before you increase your fluid intake.)  · Urinate when you need to. · Urinate right after you have sex. · Change sanitary pads often.   · Avoid douches, bubble baths, feminine hygiene sprays, and other feminine hygiene products that have deodorants. · After going to the bathroom, wipe from front to back. When should you call for help? Call your doctor now or seek immediate medical care if:    · Symptoms such as fever, chills, nausea, or vomiting get worse or appear for the first time.     · You have new pain in your back just below your rib cage. This is called flank pain.     · There is new blood or pus in your urine.     · You have any problems with your antibiotic medicine.    Watch closely for changes in your health, and be sure to contact your doctor if:    · You are not getting better after taking an antibiotic for 2 days.     · Your symptoms go away but then come back. Where can you learn more? Go to http://bety-mason.info/. Enter Z528 in the search box to learn more about \"Urinary Tract Infection in Women: Care Instructions. \"  Current as of: March 20, 2018  Content Version: 11.9  © 1851-1512 [employer]. Care instructions adapted under license by [employer] (which disclaims liability or warranty for this information). If you have questions about a medical condition or this instruction, always ask your healthcare professional. [place] [address] any warranty or liability for your use of this information. High INR Test Result: Care Instructions  Your Care Instructions    You had a blood test to check how long it takes your blood to clot. This test is called a PT or prothrombin time test. The result of the test is called the INR level. A high INR level can happen when you take warfarin (Coumadin). Warfarin helps prevent blood clots. To do this, it slows the amount of time it takes for your blood to clot. This raises your INR level. The INR goal for people who take warfarin is usually from 2 to 3. A value higher than 3.5 increases the risk of bleeding problems.   Many things can affect the way warfarin works. Some natural health products and other medicines can make warfarin work too well. That can raise the risk of bleeding. If you drink a lot of alcohol, that may raise your INR. And severe diarrhea or vomiting can also raise your INR. The best way to lower your INR will depend on several things. In some cases, the doctor may have you stop taking warfarin for a few days. You may also be given other medicines to take. You will need to be tested often to make sure your INR level is going down. You will also need to watch for signs of bleeding. The doctor has checked you carefully, but problems can develop later. If you notice any problems or new symptoms, get medical treatment right away. Follow-up care is a key part of your treatment and safety. Be sure to make and go to all appointments, and call your doctor if you are having problems. It's also a good idea to know your test results and keep a list of the medicines you take. How can you care for yourself at home? Be careful with medicines and foods  · Don't start or stop taking any medicines, vitamins, or natural remedies unless you first talk to your doctor. · Keep the amount of vitamin K in your diet about the same from day to day. Do not suddenly eat a lot more or a lot less food that is rich in vitamin K than you usually do. Vitamin K affects how warfarin works and how your blood clots. · Limit your use of alcohol. Avoid bleeding by preventing falls  · Wear slippers or shoes with nonskid soles. · Remove throw rugs and clutter. · Rearrange furniture and electrical cords to keep them out of walking paths. · Keep stairways, porches, and outside walkways well lit. Use night-lights in hallways and bathrooms. · Be extra careful when you work with sharp tools or knives. When should you call for help? Call 911 anytime you think you may need emergency care.  For example, call if:    · You passed out (lost consciousness).     · You have signs of severe bleeding, such as:  ? A severe headache that is different from past headaches. ? Vomiting blood or what looks like coffee grounds. ? Passing maroon or very bloody stools.    Call your doctor now or seek immediate medical care if:    · You have unexpected bleeding, including:  ? Blood in stools or black stools that look like tar.  ? Blood in your urine. ? Bruises or blood spots under the skin.     · You feel dizzy or lightheaded.    Watch closely for changes in your health, and be sure to contact your doctor if:    · You do not get better as expected. Where can you learn more? Go to http://bety-mason.info/. Enter C178 in the search box to learn more about \"High INR Test Result: Care Instructions. \"  Current as of: July 22, 2018  Content Version: 11.9  © 7830-2989 [employer]. Care instructions adapted under license by [employer] (which disclaims liability or warranty for this information). If you have questions about a medical condition or this instruction, always ask your healthcare professional. [place] [address] any warranty or liability for your use of this information. Heart Failure: Care Instructions  Your Care Instructions    Heart failure occurs when your heart does not pump as much blood as the body needs. Failure does not mean that the heart has stopped pumping but rather that it is not pumping as well as it should. Over time, this causes fluid buildup in your lungs and other parts of your body. Fluid buildup can cause shortness of breath, fatigue, swollen ankles, and other problems. By taking medicines regularly, reducing sodium (salt) in your diet, checking your weight every day, and making lifestyle changes, you can feel better and live longer. Follow-up care is a key part of your treatment and safety. Be sure to make and go to all appointments, and call your doctor if you are having problems.  It's also a good idea to know your test results and keep a list of the medicines you take. How can you care for yourself at home? Medicines    · Be safe with medicines. Take your medicines exactly as prescribed. Call your doctor if you think you are having a problem with your medicine.     · Do not take any vitamins, over-the-counter medicine, or herbal products without talking to your doctor first. Lucita Boyd not take ibuprofen (Advil or Motrin) and naproxen (Aleve) without talking to your doctor first. They could make your heart failure worse.     · You may be taking some of the following medicine. ? Beta-blockers can slow heart rate, decrease blood pressure, and improve your condition. Taking a beta-blocker may lower your chance of needing to be hospitalized. ? Angiotensin-converting enzyme inhibitors (ACEIs) reduce the heart's workload, lower blood pressure, and reduce swelling. Taking an ACEI may lower your chance of needing to be hospitalized again. ? Angiotensin II receptor blockers (ARBs) work like ACEIs. Your doctor may prescribe them instead of ACEIs. ? Diuretics, also called water pills, reduce swelling. ? Potassium supplements replace this important mineral, which is sometimes lost with diuretics. ? Aspirin and other blood thinners prevent blood clots, which can cause a stroke or heart attack.    You will get more details on the specific medicines your doctor prescribes. Diet    · Your doctor may suggest that you limit sodium to 2,000 milligrams (mg) a day or less. That is less than 1 teaspoon of salt a day, including all the salt you eat in cooking or in packaged foods. People get most of their sodium from processed foods. Fast food and restaurant meals also tend to be very high in sodium.     · Ask your doctor how much liquid you can drink each day. You may have to limit liquids.    Weight    · Weigh yourself without clothing at the same time each day. Record your weight.  Call your doctor if you have a sudden weight gain, such as more than 2 to 3 pounds in a day or 5 pounds in a week. (Your doctor may suggest a different range of weight gain.) A sudden weight gain may mean that your heart failure is getting worse.    Activity level    · Start light exercise (if your doctor says it is okay). Even if you can only do a small amount, exercise will help you get stronger, have more energy, and manage your weight and your stress. Walking is an easy way to get exercise. Start out by walking a little more than you did before. Bit by bit, increase the amount you walk.     · When you exercise, watch for signs that your heart is working too hard. You are pushing yourself too hard if you cannot talk while you are exercising. If you become short of breath or dizzy or have chest pain, stop, sit down, and rest.     · If you feel \"wiped out\" the day after you exercise, walk slower or for a shorter distance until you can work up to a better pace.     · Get enough rest at night. Sleeping with 1 or 2 pillows under your upper body and head may help you breathe easier.    Lifestyle changes    · Do not smoke. Smoking can make a heart condition worse. If you need help quitting, talk to your doctor about stop-smoking programs and medicines. These can increase your chances of quitting for good. Quitting smoking may be the most important step you can take to protect your heart.     · Limit alcohol to 2 drinks a day for men and 1 drink a day for women. Too much alcohol can cause health problems.     · Avoid getting sick from colds and the flu. Get a pneumococcal vaccine shot. If you have had one before, ask your doctor whether you need another dose. Get a flu shot each year. If you must be around people with colds or the flu, wash your hands often. When should you call for help?   Call 911 if you have symptoms of sudden heart failure such as:    · You have severe trouble breathing.     · You cough up pink, foamy mucus.     · You have a new irregular or rapid heartbeat.    Call your doctor now or seek immediate medical care if:    · You have new or increased shortness of breath.     · You are dizzy or lightheaded, or you feel like you may faint.     · You have sudden weight gain, such as more than 2 to 3 pounds in a day or 5 pounds in a week. (Your doctor may suggest a different range of weight gain.)     · You have increased swelling in your legs, ankles, or feet.     · You are suddenly so tired or weak that you cannot do your usual activities.    Watch closely for changes in your health, and be sure to contact your doctor if you develop new symptoms. Where can you learn more? Go to http://bety-mason.info/. Enter M537 in the search box to learn more about \"Heart Failure: Care Instructions. \"  Current as of: July 22, 2018  Content Version: 11.9  © 3382-4099 [employer]. Care instructions adapted under license by [employer] (which disclaims liability or warranty for this information). If you have questions about a medical condition or this instruction, always ask your healthcare professional. [place] [address] any warranty or liability for your use of this information. Avoiding Triggers With Heart Failure: Care Instructions  Your Care Instructions    Triggers are anything that make your heart failure flare up. A flare-up is also called \"sudden heart failure\" or \"acute heart failure. \" When you have a flare-up, fluid builds up in your lungs, and you have problems breathing. You might need to go to the hospital. By watching for changes in your condition and avoiding triggers, you can prevent heart failure flare-ups. Follow-up care is a key part of your treatment and safety. Be sure to make and go to all appointments, and call your doctor if you are having problems. It's also a good idea to know your test results and keep a list of the medicines you take. How can you care for yourself at home?   Watch for changes in your weight and condition  · Weigh yourself without clothing at the same time each day. Record your weight. Call your doctor if you have sudden weight gain, such as more than 2 to 3 pounds in a day or 5 pounds in a week. (Your doctor may suggest a different range of weight gain.) A sudden weight gain may mean that your heart failure is getting worse. · Keep a daily record of your symptoms. Write down any changes in how you feel, such as new shortness of breath, cough, or problems eating. Also record if your ankles are more swollen than usual and if you feel more tired than usual. Note anything that you ate or did that could have triggered these changes. Limit sodium  Sodium causes your body to hold on to extra water. This may cause your heart failure symptoms to get worse. People get most of their sodium from processed foods. Fast food and restaurant meals also tend to be very high in sodium. · Your doctor may suggest that you limit sodium to 2,000 milligrams (mg) a day or less. That is less than 1 teaspoon of salt a day, including all the salt you eat in cooking or in packaged foods. · Read food labels on cans and food packages. They tell you how much sodium you get in one serving. Check the serving size. If you eat more than one serving, you are getting more sodium. · Be aware that sodium can come in forms other than salt, including monosodium glutamate (MSG), sodium citrate, and sodium bicarbonate (baking soda). MSG is often added to Asian food. You can sometimes ask for food without MSG or salt. · Slowly reducing salt will help you adjust to the taste. Take the salt shaker off the table. · Flavor your food with garlic, lemon juice, onion, vinegar, herbs, and spices instead of salt. Do not use soy sauce, steak sauce, onion salt, garlic salt, mustard, or ketchup on your food, unless it is labeled \"low-sodium\" or \"low-salt. \"  · Make your own salad dressings, sauces, and ketchup without adding salt.  · Use fresh or frozen ingredients, instead of canned ones, whenever you can. Choose low-sodium canned goods. · Eat less processed food and food from restaurants, including fast food. Exercise as directed  Moderate, regular exercise is very good for your heart. It improves your blood flow and helps control your weight. But too much exercise can stress your heart and cause a heart failure flare-up. · Check with your doctor before you start an exercise program.  · Walking is an easy way to get exercise. Start out slowly. Gradually increase the length and pace of your walk. Swimming, riding a bike, and using a treadmill are also good forms of exercise. · When you exercise, watch for signs that your heart is working too hard. You are pushing yourself too hard if you cannot talk while you are exercising. If you become short of breath or dizzy or have chest pain, stop, sit down, and rest.  · Do not exercise when you do not feel well. Take medicines correctly  · Take your medicines exactly as prescribed. Call your doctor if you think you are having a problem with your medicine. · Make a list of all the medicines you take. Include those prescribed to you by other doctors and any over-the-counter medicines, vitamins, or supplements you take. Take this list with you when you go to any doctor. · Take your medicines at the same time every day. It may help you to post a list of all the medicines you take every day and what time of day you take them. · Make taking your medicine as simple as you can. Plan times to take your medicines when you are doing other things, such as eating a meal or getting ready for bed. This will make it easier to remember to take your medicines. · Get organized. Use helpful tools, such as daily or weekly pill containers. When should you call for help?   Call 911 if you have symptoms of sudden heart failure such as:    · You have severe trouble breathing.     · You cough up pink, foamy mucus.     · You have a new irregular or rapid heartbeat.    Call your doctor now or seek immediate medical care if:    · You have new or increased shortness of breath.     · You are dizzy or lightheaded, or you feel like you may faint.     · You have sudden weight gain, such as more than 2 to 3 pounds in a day or 5 pounds in a week. (Your doctor may suggest a different range of weight gain.)     · You have increased swelling in your legs, ankles, or feet.     · You are suddenly so tired or weak that you cannot do your usual activities.    Watch closely for changes in your health, and be sure to contact your doctor if you develop new symptoms. Where can you learn more? Go to http://bety-mason.info/. Enter H862 in the search box to learn more about \"Avoiding Triggers With Heart Failure: Care Instructions. \"  Current as of: July 22, 2018  Content Version: 11.9  © 6416-1446 [employer]. Care instructions adapted under license by [employer] (which disclaims liability or warranty for this information). If you have questions about a medical condition or this instruction, always ask your healthcare professional. [place] [address] any warranty or liability for your use of this information. Limiting Sodium and Fluids With Heart Failure: Care Instructions  Your Care Instructions    Sodium causes your body to hold on to extra water. This may cause your heart failure symptoms to get worse. Limiting sodium may help you feel better and lower your risk of having to go to the hospital.  People get most of their sodium from processed foods. Fast food and restaurant meals also tend to be very high in sodium. Your doctor may suggest that you limit sodium to 2,000 milligrams (mg) a day or less. That is less than 1 teaspoon of salt a day, including all the salt you eat in cooked or packaged foods.   Usually, you have to limit the amount of liquids you drink only if your heart failure is severe. Limiting sodium alone often is enough to help your body get rid of extra fluids. However, your doctor may tell you to limit your fluid intake to a set amount each day. Follow-up care is a key part of your treatment and safety. Be sure to make and go to all appointments, and call your doctor if you are having problems. It's also a good idea to know your test results and keep a list of the medicines you take. How can you care for yourself at home? Read food labels  · Read food labels on cans and food packages. The labels tell you how much sodium is in each serving. Make sure that you look at the serving size. If you eat more than the serving size, you have eaten more sodium than is listed for one serving. · Food labels also tell you the Percent Daily Value. If the Percent Daily Value says 50%, it means that you will get at least 50% of all the sodium you need for the entire day in one serving. Choose products with low Percent Daily Values for sodium. · Be aware that sodium can come in forms other than salt, including monosodium glutamate (MSG), sodium citrate, and sodium bicarbonate (baking soda). MSG is often added to Asian food. You can sometimes ask for food without MSG or salt. Buy low-sodium foods  · Buy foods that are labeled \"unsalted\" (no salt added), \"sodium-free\" (less than 5 mg of sodium per serving), or \"low-sodium\" (less than 140 mg of sodium per serving). A food labeled \"light sodium\" has less than half of the full-sodium version of that food. Foods labeled \"reduced-sodium\" may still have too much sodium. · Buy fresh vegetables or plain, frozen vegetables. Buy low-sodium versions of canned vegetables, soups, and other canned goods. Prepare low-sodium meals  · Use less salt each day when cooking. Reducing salt in this way will help you adjust to the taste. Do not add salt after cooking. Take the salt shaker off the table.   · Flavor your food with garlic, lemon juice, onion, vinegar, herbs, and spices instead of salt. Do not use soy sauce, steak sauce, onion salt, garlic salt, mustard, or ketchup on your food. · Make your own salad dressings, sauces, and ketchup without adding salt. · Use less salt (or none) when recipes call for it. You can often use half the salt a recipe calls for without losing flavor. Other dishes like rice, pasta, and grains do not need added salt. · Rinse canned vegetables. This removes some--but not all--of the salt. · Avoid water that has a naturally high sodium content or that has been treated with water softeners, which add sodium. Call your local water company to find out the sodium content of your water supply. If you buy bottled water, read the label and choose a sodium-free brand. Avoid high-sodium foods, such as:  · Smoked, cured, salted, and canned meat, fish, and poultry. · Ham, sarmiento, hot dogs, and luncheon meats. · Regular, hard, and processed cheese and regular peanut butter. · Crackers with salted tops. · Frozen prepared meals. · Canned and dried soups, broths, and bouillon, unless labeled sodium-free or low-sodium. · Canned vegetables, unless labeled sodium-free or low-sodium. · Salted snack foods such as chips and pretzels. · [place] fries, pizza, tacos, and other fast foods. · Pickles, olives, ketchup, and other condiments, especially soy sauce, unless labeled sodium-free or low-sodium. If you cannot cook for yourself  · Have family members or friends help you, or have someone cook low-sodium meals. · Check with your local senior nutrition program to find out where meals are served and whether they offer a low-sodium option. You can often find these programs through your local health department or hospital.  · Have meals delivered to your home. Most [facility] have a Meals on [place]. These programs provide one hot meal a day for older adults, delivered to their homes. Ask whether these meals are low-sodium.  Let them know that you are on a low-sodium diet. Limiting fluid intake  · Find a method that works for you. You might simply write down how much you drink every time you do. Some people keep a container filled with the amount of fluid allowed for that day. If they drink from a source other than the container, then they pour out that amount. · Measure your regular drinking glasses to find out how much fluid each one holds. Once you know this, you will not have to measure every time. · Besides water, milk, juices, and other drinks, some foods have a lot of fluid. Count any foods that will melt (such as ice cream or gelatin dessert) or liquid foods (such as soup) as part of your fluid intake for the day. Where can you learn more? Go to http://bety-mason.info/. Enter A166 in the search box to learn more about \"Limiting Sodium and Fluids With Heart Failure: Care Instructions. \"  Current as of: July 22, 2018  Content Version: 11.9  © 6508-4156 [employer]. Care instructions adapted under license by [employer] (which disclaims liability or warranty for this information). If you have questions about a medical condition or this instruction, always ask your healthcare professional. [place] [address] any warranty or liability for your use of this information. DISCHARGE SUMMARY from Nurse    PATIENT INSTRUCTIONS:    After general anesthesia or intravenous sedation, for 24 hours or while taking prescription Narcotics:  · Limit your activities  · Do not drive and operate hazardous machinery  · Do not make important personal or business decisions  · Do  not drink alcoholic beverages  · If you have not urinated within 8 hours after discharge, please contact your surgeon on call.     Report the following to your surgeon:  · Excessive pain, swelling, redness or odor of or around the surgical area  · Temperature over 100.5  · Nausea and vomiting lasting longer than 4 hours or if unable to take medications  · Any signs of decreased circulation or nerve impairment to extremity: change in color, persistent  numbness, tingling, coldness or increase pain  · Any questions    What to do at Home:    *  Please give a list of your current medications to your Primary Care Provider. *  Please update this list whenever your medications are discontinued, doses are      changed, or new medications (including over-the-counter products) are added. *  Please carry medication information at all times in case of emergency situations. These are general instructions for a healthy lifestyle:    No smoking/ No tobacco products/ Avoid exposure to second hand smoke  Surgeon General's Warning:  Quitting smoking now greatly reduces serious risk to your health. Obesity, smoking, and sedentary lifestyle greatly increases your risk for illness    A healthy diet, regular physical exercise & weight monitoring are important for maintaining a healthy lifestyle    You may be retaining fluid if you have a history of heart failure or if you experience any of the following symptoms:  Weight gain of 3 pounds or more overnight or 5 pounds in a week, increased swelling in our hands or feet or shortness of breath while lying flat in bed.   Please call your doctor as soon as you notice any of these symptoms; do not wait until your next office visit.  ___________________________________________________________________________________________________________________________________

## 2019-06-21 NOTE — PROGRESS NOTES
Per MD, pt stable for discharge this day back to [facility]. Transport arranged via [employer] -  time scheduled for 5:30pm.  This CM spoke with Celsa at [facility] to inform her that pt will be discharging to [facility] this day - Celsa working on getting nurse to [facility] when pt arrives. No additional discharge needs at this time. Milestones met.      Care Management Interventions  Mode of Transport at Discharge: BLS  Transition of Care Consult (CM Consult): Discharge Planning, Home Hospice(vs Hospice House)  Discharge Durable Medical Equipment: No  Physical Therapy Consult: Yes  Occupational Therapy Consult: Yes  Current Support Network: Assisted Living  Plan discussed with Pt/Family/Caregiver: No  Discharge Location  Discharge Placement: Home with hospice

## 2019-06-21 NOTE — PROGRESS NOTES
On recheck of INR, results were 3.2. It is too late to transfer to [place] this evening and will not be accepted over the weekend. Plan is to transfer on Monday.

## 2019-06-21 NOTE — PROGRESS NOTES
Gabbi Vera  Admission Date: 6/17/2019             Daily Progress Note: 6/21/2019    The patient's chart is reviewed and the patient is discussed with the staff. Patient is K 71 y.o.  female seen and evaluated at the request of Dr. Pinky Babin has a history of mixed connective tissue disease on Reclast, sulfasalazine, methotrexate, plaquenil, and prednisone 5mg daily, mechanical aortic valve on chronic coumadin, dementia, and chronic pain. She resides at [facility] and was recently treated for pneumonia. She presented to the ER at [facility] on 6/16/19 with acute respiratory failure with hypoxemia. She was admitted and we were consulted to assist. She was initially treated for pneumonia but her BNP was 818 this AM and abx discontinued and she was given IV lasix. Her tropinin was elevated and she was transferred to [facility] 6/17/19. It was recommended that she consider hospice care. She was not felt to be a candidate for C/intervention. She has been diuresed. Subjective:     Pt sitting in the chair on 3L O2. Pt reports cough with yellow phlegm. She denies wheezing. She asks to go back to bed and then falls back to sleep.      Current Facility-Administered Medications   Medication Dose Route Frequency    lip protectant (BLISTEX) ointment 1 Each  1 Each Topical PRN    carvedilol (COREG) tablet 3.125 mg  3.125 mg Oral BID WITH MEALS    acetaminophen (TYLENOL) tablet 500 mg  500 mg Oral Q6H PRN    furosemide (LASIX) injection 20 mg  20 mg IntraVENous DAILY    amoxicillin-clavulanate (AUGMENTIN) 600-42.9 mg/5 mL oral suspension 600 mg  600 mg Oral Q12H    predniSONE (DELTASONE) tablet 40 mg  40 mg Oral DAILY WITH BREAKFAST    potassium chloride (KLOR-CON) packet for solution 20 mEq  20 mEq Oral DAILY    levalbuterol (XOPENEX) nebulizer soln 0.63 mg/3 mL  0.63 mg Nebulization Q6H RT    budesonide (PULMICORT) 500 mcg/2 ml nebulizer suspension  500 mcg Nebulization BID RT    sodium chloride (NS) flush 5-40 mL  5-40 mL IntraVENous Q8H    sodium chloride (NS) flush 5-40 mL  5-40 mL IntraVENous PRN    albuterol (PROVENTIL VENTOLIN) nebulizer solution 2.5 mg  2.5 mg Nebulization Q6H PRN    dicyclomine (BENTYL) capsule 10 mg  10 mg Oral DAILY PRN    famotidine (PEPCID) tablet 20 mg  20 mg Oral QPM    folic acid (FOLVITE) tablet 1 mg  1 mg Oral DAILY    HYDROcodone-acetaminophen (NORCO) 5-325 mg per tablet 1 Tab  1 Tab Oral TID PRN    hydroxychloroquine (PLAQUENIL) tablet 300 mg  300 mg Oral DAILY    levETIRAcetam (KEPPRA) tablet 500 mg  500 mg Oral BID    levothyroxine (SYNTHROID) tablet 125 mcg  125 mcg Oral ACB    nitroglycerin (NITROSTAT) tablet 0.4 mg  0.4 mg SubLINGual Q5MIN PRN    pantoprazole (PROTONIX) tablet 40 mg  40 mg Oral ACB    polyvinyl alcohol (LIQUIFILM TEARS) 1.4 % ophthalmic solution 1 Drop  1 Drop Both Eyes PRN    sulfaSALAzine (AZULFIDINE) tablet 1,000 mg  1,000 mg Oral TID WITH MEALS    venlafaxine-SR (EFFEXOR-XR) capsule 225 mg  225 mg Oral DAILY WITH BREAKFAST    Warfarin on hold - Rx dosing   Other QPM       Review of Systems  +cough  +shortness of breath  Constitutional: negative for fever, chills, sweats  Cardiovascular: negative for chest pain, palpitations, syncope, edema  Gastrointestinal:  negative for dysphagia, reflux, vomiting, diarrhea, abdominal pain, or melena  Neurologic:  negative for focal weakness, numbness, headache    Objective:     Vitals:    06/21/19 0412 06/21/19 0446 06/21/19 0740 06/21/19 0759   BP:  117/76  109/75   Pulse:  (!) 108  (!) 59   Resp:  20 19   Temp:  97.3 °F (36.3 °C)  97.6 °F (36.4 °C)   SpO2:  96% 91% 95%   Weight: 145 lb (65.8 kg)        Intake and Output:   06/19 1901 - 06/21 0700  In: 270 [P.O.:270]  Out: 1405 [Urine:1405]  06/21 0701 - 06/21 1900  In: 240 [P.O.:240]  Out: -     Physical Exam:   Constitution:  the patient is well developed and in no acute distress, on 3L O2  EENMT: Sclera clear, pupils equal, oral mucosa moist  Respiratory: few crackles   Cardiovascular:  RRR, + systolic click  Gastrointestinal: soft and non-tender; with positive bowel sounds. Musculoskeletal: warm without cyanosis. There is no lower extremity edema. Skin:  no jaundice or rashes,   Neurologic: no gross neuro deficits     Psychiatric:  alert and oriented x 3    CXR:       LAB  No results for input(s): GLUCPOC in the last 72 hours. No lab exists for component: [place]   Recent Labs     06/21/19  0404 06/20/19  0500 06/19/19  0545   WBC  --  5.5 7.0   HGB  --  12.8 11.6*   HCT  --  40.6 36.5   PLT  --  217 161   INR 9.7* 5.9* 5.5*     Recent Labs     06/20/19  0500 06/19/19  0545    138   K 4.0 3.5    101   CO2 25 27   * 102*   BUN 30* 25*   CREA 0.68 0.52*   CA 9.6 9.2     No results for input(s): PH, PCO2, PO2, HCO3, PHI, PCO2I, PO2I, HCO3I in the last 72 hours. No results for input(s): LCAD, LAC in the last 72 hours.       Assessment:  (Medical Decision Making)     Hospital Problems  Date Reviewed: 6/21/2019          Codes Class Noted POA    Acute systolic congestive heart failure ([facility] ICD-10-CM: I50.21  ICD-9-CM: 428.21, 428.0  6/17/2019 Yes    Continue diuresis     Elevated troponin ICD-10-CM: R74.8  ICD-9-CM: 790.6  6/17/2019 Yes    Cardiology following     * (Principal) Acute respiratory failure with hypoxia ([facility] ICD-10-CM: J96.01  ICD-9-CM: 518.81  6/16/2019 Yes    Wean O2     Immunosuppressed status ([facility] ICD-10-CM: D89.9  ICD-9-CM: 279.9  6/20/2014 Yes    Chronic     HTN (hypertension)--Dr. Yeny Connelly ICD-10-CM: I10  ICD-9-CM: 401.9  1/17/2013 Yes    Controlled     GERD (gastroesophageal reflux disease) ICD-10-CM: K21.9  ICD-9-CM: 530.81  1/17/2013 Yes        Chronic pain (Chronic) ICD-10-CM: N59.72  ICD-9-CM: 338.29  2/4/2010 Yes    Chronic issue     S/P AVR (aortic valve replacement) (Chronic) ICD-10-CM: Z95.2  ICD-9-CM: V43.3  2/4/2010 Yes    With mechanical valve, coumadin on hold secondary to high INR          Plan:  (Medical Decision Making)     --wean O2 as tolerated, has PFO with history of R-->L shunt  --continue Augmentin  --continue nebs  --continue prednisone  --continue lasix      More than 50% of the time documented was spent in face-to-face contact with the patient and in the care of the patient on the floor/unit where the patient is located. ABRAHAM Daniel    Lungs: decreased BS in the bases  Heart:  RRR with no Murmur/Rubs/Gallops    Additional Comments:  Cont. Augmentin course, steroids and nebs. Plan to go back to skilled nursing today    I have spoken with and examined the patient. I agree with the above assessment and plan as documented.     Dandy Patel MD

## 2019-06-21 NOTE — PROGRESS NOTES
Dr aware of patients INR this am. Oral Vit K was given and will recheck INR at 3pm. Discharge will be held until tomorrow at this time.

## 2019-06-21 NOTE — PROGRESS NOTES
Problem: Pressure Injury - Risk of  Goal: *Prevention of pressure injury  Description  Document Shadi Scale and appropriate interventions in the flowsheet. Outcome: Progressing Towards Goal     Problem: Falls - Risk of  Goal: *Absence of Falls  Description  Document Ko Swift Fall Risk and appropriate interventions in the flowsheet.   Outcome: Progressing Towards Goal  Note:   Fall Risk Interventions:  Mobility Interventions: Bed/chair exit alarm, Communicate number of staff needed for ambulation/transfer    Mentation Interventions: Bed/chair exit alarm, Door open when patient unattended, Reorient patient, More frequent rounding    Medication Interventions: Bed/chair exit alarm, Evaluate medications/consider consulting pharmacy    Elimination Interventions: Bed/chair exit alarm, Call light in reach, Patient to call for help with toileting needs

## 2019-06-21 NOTE — PROGRESS NOTES
Physical Therapy Note:    Chart reviewed for physical therapy treatment. Noted critical lab value INR 9.7. Will hold physical therapy treatment today.      Thank you,  Catia Singh, PT, DPT

## 2019-06-21 NOTE — DISCHARGE SUMMARY
Discharge Summary     Patient: Juli Martin MRN: [id number]  SSN: [id number]    YOB: 1945  Age: 68 y.o. Sex: female       Admit Date: 6/17/2019    Discharge Date: 6/21/2019      Admission Diagnoses: Elevated troponin [R74.8]    Discharge Diagnoses:   Problem List as of 6/21/2019 Date Reviewed: 6/21/2019          Codes Class Noted - Resolved    Interatrial septal aneurysm with PFO ICD-10-CM: Q21.1  ICD-9-CM: 745.5  6/17/2019 - Present    Overview Signed 6/17/2019  8:47 AM by ABRAHAM Holley     10/2016: echo from [facility]:   · The left ventricle is small. · There is mild concentric left ventricular hypertrophy present. · The left ventricular systolic function is normal (55-65%). · Normal left ventricular diastolic function. · There is a patent foramen ovale present with right to left shunting   indicated by bubble study. · There is a bileaflet tilting disc mechanical aortic valve present. The   prosthetic valve function is normal.  · Mild tricuspid valve regurgitation. · Saline contrast was given to evaluate for intracardiac shunt.  Right to   left shunt seen at rest.  · There is an interatrial septal aneurysm present             Acute systolic congestive heart failure ([facility] ICD-10-CM: I50.21  ICD-9-CM: 428.21, 428.0  6/17/2019 - Present        Elevated troponin ICD-10-CM: R74.8  ICD-9-CM: 790.6  6/17/2019 - Present        * (Principal) Acute respiratory failure with hypoxia [facility]) ICD-10-CM: J96.01  ICD-9-CM: 518.81  6/16/2019 - Present        Immunocompromised state due to drug therapy ICD-10-CM: Z79.899  ICD-9-CM: V58.69  6/16/2019 - Present        Mixed connective tissue disease ([facility] ICD-10-CM: M35.1  ICD-9-CM: 710.8  6/16/2019 - Present        Medication induced coagulopathy ([facility] ICD-10-CM: D68.9, T50.905A  ICD-9-CM: 286.9, E947.9  6/16/2019 - Present        Polypharmacy ICD-10-CM: A99.875  ICD-9-CM: V58.69  6/16/2019 - Present        Pre-op examination ICD-10-CM: U54.335  ICD-9-CM: V72.84  5/1/2019 - Present        Fall ICD-10-CM: W19. Max Clark  ICD-9-CM: E888.9  10/23/2018 - Present        Diastolic CHF, acute on chronic [facility]) ICD-10-CM: I50.33  ICD-9-CM: 428.33, 428.0  5/9/2018 - Present        Long term (current) use of anticoagulants ICD-10-CM: Z79.01  ICD-9-CM: V58.61  4/27/2018 - Present        Neuropathic pain ICD-10-CM: M79.2  ICD-9-CM: 729.2  4/23/2018 - Present        Familial tremor ICD-10-CM: G25.0  ICD-9-CM: 333.1  7/10/2017 - Present        UTI (urinary tract infection) ICD-10-CM: N39.0  ICD-9-CM: 599.0  6/29/2017 - Present        Hypokalemia ICD-10-CM: E87.6  ICD-9-CM: 276.8  6/28/2017 - Present        Hypomagnesemia ICD-10-CM: E83.42  ICD-9-CM: 275.2  6/28/2017 - Present        Dyspnea on exertion ICD-10-CM: R06.09  ICD-9-CM: 786.09  4/14/2017 - Present        Recurrent major depressive disorder, in partial remission ([facility] ICD-10-CM: F33.41  ICD-9-CM: 296.35  4/14/2017 - Present        Sleep disturbance ICD-10-CM: G47.9  ICD-9-CM: 780.50  Unknown - Present        Shortness of breath ICD-10-CM: R06.02  ICD-9-CM: 786.05  3/23/2017 - Present        Encounter for medication management ICD-10-CM: Z79.899  ICD-9-CM: V58.69  3/21/2017 - Present        Postural imbalance ICD-10-CM: R29.3  ICD-9-CM: 729.90  3/21/2017 - Present        Polyneuropathy ICD-10-CM: G62.9  ICD-9-CM: 356.9  3/21/2017 - Present        Seasonal allergic rhinitis due to pollen ICD-10-CM: J30.1  ICD-9-CM: 477.0  Unknown - Present        Moderate episode of recurrent major depressive disorder ([facility] ICD-10-CM: F33.1  ICD-9-CM: 296.32  1/13/2017 - Present        Seizure disorder ([facility] ICD-10-CM: G40.909  ICD-9-CM: 345.90  12/13/2016 - Present        Fungal dermatitis ICD-10-CM: B36.9  ICD-9-CM: 111.9  12/13/2016 - Present        Muscle spasticity ICD-10-CM: X28.081  ICD-9-CM: 728.85  11/9/2016 - Present        Facial hematoma ICD-10-CM: A51.60XP  ICD-9-CM: 403  11/9/2016 - Present        Neck pain ICD-10-CM: M54.2  ICD-9-CM: 723.1  11/9/2016 - Present        Right ankle pain ICD-10-CM: M25.571  ICD-9-CM: 719.47  11/9/2016 - Present        Acquired hypothyroidism ICD-10-CM: E03.9  ICD-9-CM: 244.9  9/12/2016 - Present        Expressive aphasia ICD-10-CM: R47.01  ICD-9-CM: 784.3  3/2/2016 - Present        Chronic anxiety ICD-10-CM: F41.9  ICD-9-CM: 300.00  2/19/2016 - Present        Dysphagia ICD-10-CM: R13.10  ICD-9-CM: 787.20  2/19/2016 - Present        Osteoporosis ICD-10-CM: M81.0  ICD-9-CM: 733.00  7/7/2015 - Present        Acquired absence of breast and nipple ICD-10-CM: Z90.10  ICD-9-CM: V45.71  Unknown - Present        Pain in joint involving pelvic region and thigh ICD-10-CM: M25.559  ICD-9-CM: 719.45  Unknown - Present        Postmenopausal atrophic vaginitis ICD-10-CM: N95.2  ICD-9-CM: 627.3  Unknown - Present        Embolism and thrombosis of unspecified artery ([facility] ICD-10-CM: I74.9  ICD-9-CM: 444.9  9/12/2014 - Present        Primary localized osteoarthrosis, pelvic region and thigh ICD-10-CM: M16.10  ICD-9-CM: 715.15  Unknown - Present        Long term current use of anticoagulant therapy ICD-10-CM: Z79.01  ICD-9-CM: V58.61  Unknown - Present        Fibromyalgia ICD-10-CM: M79.7  ICD-9-CM: 729.1  Unknown - Present        Immunosuppressed status ([facility] ICD-10-CM: D89.9  ICD-9-CM: 279.9  6/20/2014 - Present        Dermatomyositis ([facility] ICD-10-CM: M33.90  ICD-9-CM: 710.3  6/20/2014 - Present        Prosthetic hip implant failure ([facility] ICD-10-CM: T84.018A, Z96.649  ICD-9-CM: 996.47, V43.64  2/27/2013 - Present        HTN (hypertension)--Dr. Aleyda Flower ICD-10-CM: I10  ICD-9-CM: 401.9  1/17/2013 - Present        GERD (gastroesophageal reflux disease) ICD-10-CM: K21.9  ICD-9-CM: 530.81  1/17/2013 - Present        PUD (peptic ulcer disease) ICD-10-CM: K27.9  ICD-9-CM: 533.90  1/17/2013 - Present        S/P total hip arthroplasty ICD-10-CM: G57.349  ICD-9-CM: V43.64  1/17/2013 - Present        Fracture of femoral neck, right [facility]) ICD-10-CM: S72.001A  ICD-9-CM: 820.8  3/10/2012 - Present        History of fracture of hip ICD-10-CM: Z87.81  ICD-9-CM: V15.51  3/10/2012 - Present    Overview Signed 3/10/2017 11:48 PM by Gustavo Maxwell MD     femoral neck             Chronic pain (Chronic) ICD-10-CM: P79.61  ICD-9-CM: 338.29  2/4/2010 - Present        S/P AVR (aortic valve replacement) (Chronic) ICD-10-CM: Z95.2  ICD-9-CM: V43.3  2/4/2010 - Present        RESOLVED: Acute metabolic encephalopathy LQP-35-WC: G93.41  ICD-9-CM: 348.31  6/28/2017 - 4/23/2018        RESOLVED: Acute encephalopathy ICD-10-CM: G93.40  ICD-9-CM: 348.30  11/9/2016 - 4/23/2018        RESOLVED: History of drug overdose ICD-10-CM: Z91.89  ICD-9-CM: V15.6  9/12/2016 - 10/23/2018        RESOLVED: Drug overdose ICD-10-CM: T50.901A  ICD-9-CM: 977.9, E980.5  8/23/2016 - 10/23/2018        RESOLVED: Altered mental status ICD-10-CM: R41.82  ICD-9-CM: 780.97  8/23/2016 - 4/23/2018        RESOLVED: Aortic valve stenosis, congenital ICD-10-CM: Q23.0  ICD-9-CM: 746.3  2/19/2016 - 11/9/2017        RESOLVED: Allergic rhinitis, cause unspecified ICD-10-CM: J30.9  ICD-9-CM: 477.9  Unknown - 4/8/2017        RESOLVED: Other ill-defined cerebrovascular disease ICD-10-CM: I67.89  ICD-9-CM: 437.8  Unknown - 4/23/2018        RESOLVED: Acute CVA (cerebrovascular accident) (HCC)--Dr. Elicia Meyers ICD-10-CM: I63.9  ICD-9-CM: 434.91  6/21/2014 - 6/8/2016        RESOLVED: Acute blood loss anemia ICD-10-CM: D62  ICD-9-CM: 285.1  3/12/2012 - 7/21/2015        RESOLVED: Abnormal INR ICD-10-CM: R79.1  ICD-9-CM: 790.92  2/11/2010 - 2/13/2010        RESOLVED: Candida Esophagitis ICD-10-CM: K20.9  ICD-9-CM: 530.10  2/11/2010 - 3/19/2012        RESOLVED: Encephalopathy ICD-10-CM: G93.40  ICD-9-CM: 348.30  2/4/2010 - 2/13/2010        RESOLVED: Anemia ICD-10-CM: D64.9  ICD-9-CM: 285.9  2/4/2010 - 2/11/2010        RESOLVED: Leukocytosis ICD-10-CM: M39.304  ICD-9-CM: 288.60 2/4/2010 - 2/11/2010        RESOLVED: ARF (acute renal failure) ([facility] ICD-10-CM: N17.9  ICD-9-CM: 584.9  2/4/2010 - 2/11/2010        RESOLVED: Dermatomyositis ([facility] (Chronic) ICD-10-CM: M33.90  ICD-9-CM: 710.3  2/4/2010 - 3/19/2012        RESOLVED: History of encephalopathy ICD-10-CM: Z86.69  ICD-9-CM: V12.49  2/4/2010 - 10/23/2018               Discharge Condition: [facility] Course: This is a 67 yo F who resides at [facility], who was sent via EMS to the ER at [facility] on 6/16  for hypoxic respiratory failure in setting of pneumonia and failing outpatient antibiotics (Amoxicillin). She has a PMH of  mixed connective tissue disease on several immunosuppressive agents (sulfasalazine, methotrexate, plaquenil, prednisone), chronic warfarin therapy for history of mechanical aortic valve replacement, and chronic pain. She was sent to the ER at the [facility] facility  because she started having worsening  mental status and was found to be hypoxic with oxygen saturation in the low 80s. A CXR  At admission showed R base pneumonia. She was started on supplemental oxygen and given tobra/vanc/zosyn for treatment for HCAP. Her respiratory status worsened and repeat CXR reported bibasilar infiltrates/ lung congestion. She was placed on Airvo 40L/65%. She remained confused. Troponin checked and was elevated at 14.5 and echo showed new LV systolic dysfunction with LVEF of 20-25% and concern for Takostubo cardiomyopathy. Her  INR was up to  6.8. Patient was transferred to the [facility] where she was seen and evaluated by the cardiology team and the pulmonary team. She was managed with antibiotics, increased dose of steroids, IV lasix, her methotrexate has been held. She has developed oral thrush. Her condition improved to the point mental status and respiratory status improved. She is down to 3 Lts / min by nasal canula.  On 6/21 her INR was up to 9 and she received oral Vit K. Her INR improved to 3.2 after that. She is VERY debilitated. The patient has a DNR status and she and her POA have expressed her wish to go back to [facility] under the understanding she will be seen by hospice and if her conditions deteriorates any further she will go into comfort measures with hospice. She is being discharged with a zapata catheter in place. If her condition remains stable zapata can be removed on 6/24. HER INR HAS to be checked on a daily basis and the dose of coumadin will need to be determined ( suggest start with 1.5- 2 mg per day). She has been discharged on a prednisone taper with plan to be tapered down to her 5 mg po daily. Her methotrexate has been discontinued for now and would NOT recommend to resume it due to the severe infection she had. If her condition remains stable methotrexate could be resumed in several weeks IF NEEDED. She is being discharged on 3 lts of O2 by nasal canula.        PE:  GENERAL: alert, appears stated age  EYE: conjunctivae/corneas clear. LYMPHATIC: Cervical, supraclavicular, and axillary nodes normal.   THROAT & NECK: normal and no erythema or exudates noted. LUNG: no wheezing. Decreased breath sounds in both lung bases   HEART: regular rate and rhythm, S1, S2 normal, no murmur, click, rub or gallop  ABDOMEN: soft, non-tender. Bowel sounds normal. No masses,  no organomegaly. Zapata in place  EXTREMITIES:  extremities normal, atraumatic, no cyanosis or edema  SKIN: Normal.  NEUROLOGIC: No focal deficits   PSYCHIATRIC: non focal             Consults: Cardiology and Pulmonary/Critical Care    Significant Diagnostic Studies: see hospital course     Disposition: retirement     Discharge Medications:   Current Discharge Medication List      START taking these medications    Details   albuterol (PROVENTIL VENTOLIN) 2.5 mg /3 mL (0.083 %) nebu 3 mL by Nebulization route every six (6) hours as needed for Other (SOB, WHEEZING).   Qty: 30 Nebule, Refills: 0      amoxicillin-clavulanate (AUGMENTIN) 600-42.9 mg/5 mL suspension Take 5 mL by mouth every twelve (12) hours for 5 days. Qty: 50 mL, Refills: 0      budesonide (PULMICORT) 0.5 mg/2 mL nbsp 2 mL by Nebulization route two (2) times a day for 7 days. Qty: 14 Each, Refills: 0      furosemide (LASIX) 20 mg tablet Take 1 Tab by mouth daily. Qty: 30 Tab, Refills: 0      nystatin (MYCOSTATIN) 100,000 unit/mL suspension Take 5 mL by mouth four (4) times daily for 7 days. swish and spit  Qty: 140 mL, Refills: 0      potassium chloride (KLOR-CON) 20 mEq pack Take 0.5 Packets by mouth daily. Qty: 15 Packet, Refills: 0         CONTINUE these medications which have CHANGED    Details   acetaminophen (TYLENOL) 500 mg tablet Take 1 Tab by mouth every six (6) hours as needed for Pain or Fever. Qty: 30 Tab, Refills: 0      carvedilol (COREG) 3.125 mg tablet Take 1 Tab by mouth two (2) times daily (with meals). Qty: 60 Tab, Refills: 0      HYDROcodone-acetaminophen (NORCO) 5-325 mg per tablet Take 1 Tab by mouth every eight (8) hours as needed for Pain (use for severe pain) for up to 3 days. Max Daily Amount: 3 Tabs. Qty: 10 Tab, Refills: 0    Associated Diagnoses: Abdominal pain, unspecified abdominal location      ! ! predniSONE (DELTASONE) 20 mg tablet Take 2 tablets daily with breakfast  for 3 days, then  Take 1 tablet daily with breakfast for 3 days, then   Take 0.5 tablet daily with breakfast for 3 days, then START TAKING 5 MG PO DAILY AS YOU WERE BEFORE  Qty: 11 Tab, Refills: 0      !! predniSONE (DELTASONE) 5 mg tablet Start TAKING 5 MG PO DAILY AFTER YOU HAVE COMPLETED THE PREDNISONE TAPER ORDERED  Qty: 30 Tab, Refills: 0      warfarin (COUMADIN) 1 mg tablet DOSE OF COUMADIN WILL NEED TO BE DETERMINED AS PATIENT IS SUPRATHERAPEUTIC. INR WAS UP TO 9 TODAY. RECEIVED 2.5 MG PO OF VIT K. INR DOWN TO 3.2 TODAY.  WILL NEED DAILY INR FOR NOW. WOULD SUGGEST STARTING A LOW DOSE OF COUMADIN POSSIBLY 1.5 TO 2 MG PO DAILY  Qty: 5 Tab, Refills: 0 cyanocobalamin 1,000 mcg tablet Take 1 Tab by mouth daily. Qty: 30 Tab, Refills: 0      diphenoxylate-atropine (LOMOTIL) 2.5-0.025 mg per tablet Take 1 Tab by mouth four (4) times daily as needed for Diarrhea. Max Daily Amount: 4 Tabs. Qty: 30 Tab, Refills: 0    Associated Diagnoses: Immunocompromised state due to drug therapy      ondansetron hcl (ZOFRAN) 4 mg tablet Take 1 Tab by mouth every eight (8) hours as needed for Nausea. Qty: 60 Tab, Refills: 0       !! - Potential duplicate medications found. Please discuss with provider. CONTINUE these medications which have NOT CHANGED    Details   polyvinyl alcohol (LIQUIFILM TEARS) 1.4 % ophthalmic solution Administer 1 Drop to both eyes as needed. Indications: dry eye      raNITIdine (ZANTAC) 150 mg tablet Take 150 mg by mouth nightly. folic acid (FOLVITE) 1 mg tablet Take 1 Tab by mouth daily. Qty: 30 Tab, Refills: 11    Associated Diagnoses: Methotrexate, long term, current use      hydroxychloroquine (PLAQUENIL) 200 mg tablet Take 1.5 Tabs by mouth daily. Qty: 60 Tab, Refills: 3    Associated Diagnoses: Mixed connective tissue disease ([address]      venlafaxine (EFFEXOR) 75 mg tablet Take 225 mg by mouth daily. fluticasone (FLONASE ALLERGY RELIEF) 50 mcg/actuation nasal spray 2 Sprays by Both Nostrils route daily as needed for Rhinitis. levothyroxine (SYNTHROID) 125 mcg tablet Take  by mouth Daily (before breakfast). nitroglycerin (NITROSTAT) 0.4 mg SL tablet 1 Tab by SubLINGual route every five (5) minutes as needed for Chest Pain. Qty: 25 Tab, Refills: 5      dicyclomine (BENTYL) 10 mg capsule Take 1 Cap by mouth daily as needed. Qty: 30 Cap, Refills: 0      pantoprazole (PROTONIX) 40 mg tablet Take 1 Tab by mouth daily. Qty: 30 Tab, Refills: 0      levETIRAcetam (KEPPRA) 500 mg tablet Take 500 mg by mouth two (2) times a day. sulfaSALAzine (AZULFIDINE) 500 mg tablet Take 1,000 mg by mouth three (3) times daily as needed. STOP taking these medications       methotrexate (RHEUMATREX) 2.5 mg tablet Comments:   Reason for Stopping:         amitriptyline (ELAVIL) 10 mg tablet Comments:   Reason for Stopping:         zoledronic acid/mannitol-water (RECLAST IV) Comments:   Reason for Stopping:         baclofen (LIORESAL) 20 mg tablet Comments:   Reason for Stopping:         hydroCHLOROthiazide (HYDRODIURIL) 25 mg tablet Comments:   Reason for Stopping:         spironolactone (ALDACTONE) 25 mg tablet Comments:   Reason for Stopping:         cholecalciferol, vitamin D3, (VITAMIN D3) 2,000 unit tab Comments:   Reason for Stopping:         traZODone (DESYREL) 100 mg tablet Comments:   Reason for Stopping:               Activity: Activity as tolerated  Diet: mechanical soft diet   Wound Care: None needed    Follow-up Appointments   Procedures    FOLLOW UP VISIT Appointment in: One Month CARDIOLOGY IF PATIENT BECOMES STABLE AND ABLE TO FOLLOW UP WITH CARDS     CARDIOLOGY IF PATIENT BECOMES STABLE AND ABLE TO FOLLOW UP WITH CARDS     Standing Status:   Standing     Number of Occurrences:   1     Order Specific Question:   Appointment in     Answer:   One Month       Signed By: Emi Butcher MD     June 21, 2019

## 2019-06-21 NOTE — PROGRESS NOTES
Verbal bedside report given to Jennifer Celis oncoming RN. Patient's situation, background, assessment and recommendations provided. Opportunity for questions provided. Oncoming RN assumed care of patient.

## 2019-06-21 NOTE — PROGRESS NOTES
CM contacted [facility] ([phone]) to inform them that pt is not going to discharge to [facility] this day - they noted information with no questions. This CM contacted Irene Martins [facility] in an attempt to inform them pt not ready for discharge this day and see about discharge to facility tomorrow - Lexi Carmichael did not answer and this CM left VM. Will continue to follow.

## 2019-06-21 NOTE — PROGRESS NOTES
Care Management Interventions  Mode of Transport at Discharge: BLS  Transition of Care Consult (CM Consult): Discharge Planning, Home Hospice(vs Hospice House)  Discharge Durable Medical Equipment: No  Physical Therapy Consult: Yes  Occupational Therapy Consult: Yes  Current Support Network: Assisted Living  Plan discussed with Pt/Family/Caregiver: No  Discharge Location  Discharge Placement: Home with hospice      Pt can return to [place] on Monday. Per Antwan Harsh pt will not be accepted over the weekend. CM to continue to monitor for dc needs.

## 2019-06-21 NOTE — PROGRESS NOTES
TRANSFER - OUT REPORT:    Verbal report given to Larissa Lanes on [employer]  being transferred to [facility] with Hospice care for routine progression of care       Report consisted of patients Situation, Background, Assessment and Recommendations(SBAR). Information from the following report(s) SBAR, Kardex, Intake/Output, MAR, Accordion, Recent Results and Med Rec Status was reviewed with the receiving nurse. Opportunity for questions and clarification was provided.

## 2019-06-23 ENCOUNTER — HOSPICE ADMISSION (OUTPATIENT)
Dept: HOSPICE | Facility: HOSPICE | Age: 74
End: 2019-06-23

## 2019-06-24 ENCOUNTER — HOSPICE ADMISSION (OUTPATIENT)
Dept: HOSPICE | Facility: HOSPICE | Age: 74
End: 2019-06-24

## 2019-06-24 ENCOUNTER — PATIENT OUTREACH (OUTPATIENT)
Dept: CASE MANAGEMENT | Age: 74
End: 2019-06-24

## 2019-06-24 NOTE — PROGRESS NOTES
Patient on [facility] list   Patient currently a CHF bundle patient with anchor date of 6/21/19  Patient discharged from hospital on 6/21/19 to [facility] with [facility]   PLAN:  Case closed at this time due to patient being managed by [facility]ESS[facility] and hospice care   This note will not be viewable in [address]

## 2019-06-24 NOTE — PROGRESS NOTES
Following for CHF Bundle     Patient Care Setting  Care Setting Type: Other (specify in comment)  Patient Care Setting: [facility]   [place] Date: 06/21/19  Patient Care Plan: On Track  Comments: Patient currently under Hospice care, so no further outreaches.

## 2019-09-20 PROBLEM — Z01.818 PRE-OP EXAMINATION: Status: RESOLVED | Noted: 2019-05-01 | Resolved: 2019-09-20

## 2021-07-01 ENCOUNTER — APPOINTMENT (RX ONLY)
Dept: URBAN - METROPOLITAN AREA CLINIC 158 | Facility: CLINIC | Age: 76
Setting detail: DERMATOLOGY
End: 2021-07-01

## 2021-07-01 DIAGNOSIS — L81.4 OTHER MELANIN HYPERPIGMENTATION: ICD-10-CM

## 2021-07-01 DIAGNOSIS — D18.0 HEMANGIOMA: ICD-10-CM

## 2021-07-01 DIAGNOSIS — L82.1 OTHER SEBORRHEIC KERATOSIS: ICD-10-CM

## 2021-07-01 PROBLEM — D18.01 HEMANGIOMA OF SKIN AND SUBCUTANEOUS TISSUE: Status: ACTIVE | Noted: 2021-07-01

## 2021-07-01 PROCEDURE — ? COUNSELING

## 2021-07-01 PROCEDURE — 99203 OFFICE O/P NEW LOW 30 MIN: CPT

## 2021-07-01 ASSESSMENT — LOCATION DETAILED DESCRIPTION DERM
LOCATION DETAILED: RIGHT PROXIMAL POSTERIOR UPPER ARM
LOCATION DETAILED: PERIUMBILICAL SKIN
LOCATION DETAILED: LEFT POSTERIOR SHOULDER
LOCATION DETAILED: RIGHT SUPERIOR LATERAL MALAR CHEEK
LOCATION DETAILED: RIGHT ANTERIOR PROXIMAL THIGH
LOCATION DETAILED: LEFT MEDIAL UPPER BACK
LOCATION DETAILED: RIGHT FOREHEAD
LOCATION DETAILED: RIGHT CLAVICULAR SKIN
LOCATION DETAILED: RIGHT PROXIMAL DORSAL FOREARM
LOCATION DETAILED: LEFT DISTAL PRETIBIAL REGION
LOCATION DETAILED: LEFT RIB CAGE
LOCATION DETAILED: LEFT SUPERIOR UPPER BACK
LOCATION DETAILED: RIGHT SUPERIOR UPPER BACK
LOCATION DETAILED: RIGHT POSTERIOR SHOULDER
LOCATION DETAILED: LEFT CLAVICULAR SKIN
LOCATION DETAILED: RIGHT DISTAL PRETIBIAL REGION
LOCATION DETAILED: LEFT MEDIAL TRAPEZIAL NECK
LOCATION DETAILED: EPIGASTRIC SKIN
LOCATION DETAILED: LEFT PROXIMAL POSTERIOR UPPER ARM
LOCATION DETAILED: LEFT ANTERIOR DISTAL THIGH

## 2021-07-01 ASSESSMENT — LOCATION ZONE DERM
LOCATION ZONE: ARM
LOCATION ZONE: NECK
LOCATION ZONE: TRUNK
LOCATION ZONE: LEG
LOCATION ZONE: FACE

## 2021-07-01 ASSESSMENT — LOCATION SIMPLE DESCRIPTION DERM
LOCATION SIMPLE: LEFT POSTERIOR UPPER ARM
LOCATION SIMPLE: RIGHT CLAVICULAR SKIN
LOCATION SIMPLE: ABDOMEN
LOCATION SIMPLE: RIGHT PRETIBIAL REGION
LOCATION SIMPLE: RIGHT SHOULDER
LOCATION SIMPLE: LEFT SHOULDER
LOCATION SIMPLE: RIGHT FOREHEAD
LOCATION SIMPLE: LEFT PRETIBIAL REGION
LOCATION SIMPLE: RIGHT POSTERIOR UPPER ARM
LOCATION SIMPLE: RIGHT THIGH
LOCATION SIMPLE: LEFT UPPER BACK
LOCATION SIMPLE: LEFT THIGH
LOCATION SIMPLE: LEFT CLAVICULAR SKIN
LOCATION SIMPLE: RIGHT FOREARM
LOCATION SIMPLE: RIGHT UPPER BACK
LOCATION SIMPLE: RIGHT CHEEK
LOCATION SIMPLE: POSTERIOR NECK

## 2022-12-05 NOTE — PROGRESS NOTES
Infusion Nursing Note:  April F Bakari presents today for two doses of Plts and one unit PRBC's.     Patient seen by provider today: No   present during visit today: Not Applicable.    Note: Pt arrived today with a Plt count of 3K. Was scheduled to have a LP with IT Chemo in IR today. Following two doses of Plts with oral Premeds of Benadryl (25mg) and Tylenol, post Plt count was drawn and resulted at 13K, and then 9K on an additional redraw. Dr. Hull notified of results and LP was cancelled. IR notified. Per Lucy Reed the Nurse Coordinator would be in touch with Pt very soon as Pt is not scheduled to return anymore the rest of this week.    Pt also with a Hgb of 6.1 today. One unit PRBC's transfused with repeated oral Premeds given 30 minutes prior. Pt tolerated transfusion well. VSS throughout. See Flowsheet for further details.      Intravenous Access:  Peripheral IV placed.  Port was unable to be accessed today- lab staff did not have success. Pt with a newer Port- placed within the last couple weeks.      Treatment Conditions:  Lab Results   Component Value Date    HGB 6.1 (LL) 12/05/2022    WBC 0.2 (LL) 12/05/2022    ANEU 11.7 (H) 11/24/2022    ANEUTAUTO 4.2 11/29/2022    PLT 9 (LL) 12/05/2022        Post Infusion Assessment:  Patient tolerated infusion without incident.  Site patent and intact, free from redness, edema or discomfort.  Access discontinued per protocol.     Discharge Plan:   Patient discharged in stable condition accompanied by: .      Isabela Matthews, RN                       Palliative Care Progress Note    Patient: Veronica Cain MRN: [id number]  SSN: [id number]    YOB: 1945  Age: 68 y.o. Sex: female       Assessment/Plan:     Chief Complaint/Interval History: more talkative. Reports abdominal pain       Principal Diagnosis:    · Debility, Unspecified  R53.81    Additional Diagnoses:   · Advance Care Planning Counseling Z71.89  · Failure to Thrive  R62.7  · Fatigue, Lethargy  R53.83  · Frailty  R54  · Pain, abdomen  R10.9  · Encounter for Palliative Care  Z51.5    Palliative Performance Scale (PPS)  PPS: 40    Medical Decision Making:   Reviewed and summarized notes over last 24 hours   Discussed case with appropriate providersBozena Juarez CM; Dr Marline Diallo laboratory and x-ray data- CBC, BMP    Pt resting in bed, no distress noted. No family at bedside. Pt is oriented to person, year, and states she is at [facility].  She reports abdominal pain at present, and states this is chronic for her. She has Norco available PRN, and she states this is normally effective for her. She remains on Airvo but denies dyspnea. In addition to the E/M time spent above, and additional 20 minutes was spent on ACP. Pt tells me she has a more current HCPOA, naming her daughter in law, Humble Villeda, as agent. Pt states she wants to be comfortable and back at her [facility]. Introduced concept of hospice support at her [facility] to help her reach these goals, and she is receptive to this. True Ji, has located a number for Humble Villeda ([phone]). I spoke with Humble Villeda, and provided a medical update. Humble Villeda confirms that she is pt's HCPOA, and her daughter in law. Humble Villeda was not aware that pt was in the hospital.  We discussed hospice recommendation, and Humble Villeda is agreeable to this, as well. She states pt's relationship with her son, Nathaly Villeda, is \"on and off\" but states he did see pt on Mother's Day and pt was happy to see him. She states she will call Tee and update him on pt.   Discussed with Homa Lisa. She will find out which hospice pt's [facility] prefers to use. Discussed with Dr Yadi Kaba, as well. Pt will need to wean from current O2 support before transfer back to [place] can be considered. No further PC needs- we will sign off. Thank you for allowing us to participate in Ms Mallory Hutchison' care. Will discuss findings with members of the interdisciplinary team.         More than 50% of this 15 minute visit was spent counseling and coordination of care as outlined above. Subjective:     Review of Systems:  A comprehensive review of systems was negative except for:   Constitutional: Positive for fatigue. Gastrointestinal: Positive for chronic abdominal pain      Objective:     Visit Vitals  /75   Pulse (!) 118   Temp 98.4 °F (36.9 °C)   Resp 17   Wt 150 lb 1.6 oz (68.1 kg)   SpO2 91%   BMI 24.98 kg/m²       Physical Exam:    General:  Cooperative. Debilitated and frail. No acute distress. Eyes:  Conjunctivae/corneas clear    Nose: Nares normal. Septum midline. Optiflow   Neck: Supple, symmetrical, trachea midline   Lungs:   Crackles bilaterally, unlabored   Heart:  Regular rate and rhythm   Abdomen:   Soft, non-tender, non-distended   Extremities: Normal, atraumatic, no cyanosis or edema   Skin: Skin color, texture, turgor normal. No rash or lesions.    Neurologic: Nonfocal   Psych: Alert and oriented      Signed By: Juan Zapata NP     June 19, 2019

## 2022-12-26 NOTE — CONSULTS
[facility] Cardiology Consult                Date of  Admission: No admission date for patient encounter. Primary Care Physician:   Primary Cardiologist:  Dr Sandoval Braden  Referring Physician:  Dr. Verde Doing Physician: Dr. Ida Keen    CC/Reason for consult:  Elevated troponin       Nevin Suárez is a 68 y.o. female with PMH of SJ mechanical valve, chronic pain, PUD, GERD, HTN, and mixed connective tissue disease (on several immunosuppressive drugs), who presented to ED from [facility] where she was found to be hypoxic, while undergoing treatment for pneumonia with amoxicillin in the OP setting. Staff at  reported AMS and oxygen sats were in the 80s. CXR showed RLL pneumonia. Patient was placed on supplemental oxygen and IV abx and IV started. CT of the head was negative for acute process. This am the patient was noted to have increased HR and RR with decreased O2 saturation. IV fluids were stopped, zapata placed and IV lasix ordered. . Repeat CXR showed bilateral opacities. When assess by MD, asked patient if in pain she nodded and placed hand over left chest.  Resulting troponin was 14.18. Echo showed EF of 20-25% with global hypokinesis c/w Takotsubo. Patient continues to be lethargic and is only oriented to person. Review of the chart shows NST in 2017 that was normal and previous echo showed EF 55-60%. Lab work today shows INR 6.8, WBC 7.7, H&H 11.6/36.7, plt 134, Na 136, K 3.0, BUN 16, creatinine 0.65, and ammonia level 21.        Patient Active Problem List   Diagnosis Code    Chronic pain G89.29    S/P AVR (aortic valve replacement) Z95.2    Fracture of femoral neck, right ([address] S72.001A    HTN (hypertension)--Dr. Rita Jefferson I10    GERD (gastroesophageal reflux disease) K21.9    PUD (peptic ulcer disease) K27.9    S/P total hip arthroplasty Z96.649    Prosthetic hip implant failure ([address] T84.018A, Z96.649    Immunosuppressed status ([address] D89.9    Dermatomyositis ([facility] M33.90    Embolism and thrombosis of unspecified artery (HCC) I74.9    Primary localized osteoarthrosis, pelvic region and thigh M16.10    Long term current use of anticoagulant therapy Z79.01    Fibromyalgia M79.7    Acquired absence of breast and nipple Z90.10    Pain in joint involving pelvic region and thigh M25.559    Postmenopausal atrophic vaginitis N95.2    Osteoporosis M81.0    Chronic anxiety F41.9    Dysphagia R13.10    Expressive aphasia R47.01    Acquired hypothyroidism E03.9    Muscle spasticity M62.838    Facial hematoma S00.83XA    Neck pain M54.2    Right ankle pain M25.571    Seizure disorder ([facility]) G40.909    Fungal dermatitis B36.9    Moderate episode of recurrent major depressive disorder ([facility]) F33.1    Seasonal allergic rhinitis due to pollen J30.1    History of fracture of hip Z87.81    Encounter for medication management Z79.899    Postural imbalance R29.3    Polyneuropathy G62.9    Shortness of breath R06.02    Sleep disturbance G47.9    Dyspnea on exertion R06.09    Recurrent major depressive disorder, in partial remission ([facility]) F33.41    Hypokalemia E87.6    Hypomagnesemia E83.42    UTI (urinary tract infection) N39.0    Familial tremor G25.0    Neuropathic pain M79.2    Long term (current) use of anticoagulants A47.61    Diastolic CHF, acute on chronic ([facility]) I50.33    Fall W19. Drake Orange Pre-op examination Z01.818    Acute respiratory failure with hypoxia ([facility]) J96.01    Immunocompromised state due to drug therapy Z79.899    Mixed connective tissue disease ([facility] M35.1    Medication induced coagulopathy ([facility] D68.9, T50.905A    Polypharmacy Z79.899    Interatrial septal aneurysm with PFO M56.3    Acute systolic congestive heart failure (HCC) I50.21    Elevated troponin I level R74.8       Past Medical History:   Diagnosis Date    A-fib ([facility]     Acquired hypothyroidism     Acute CVA (cerebrovascular accident) ([facility] 6/21/2014    Acute lacunar infarction ([address]     Anxiety state, unspecified     Aortic valve stenosis, congenital 2/19/2016    CAD (coronary artery disease)     Cancer (HCC)     pre cancerous cells in breast     Candida Esophagitis 2/11/2010    Chronic kidney disease     Chronic pain     Depression     Duodenal ulcer     Dysphagia 2/19/2016    Embolism and thrombosis of unspecified artery ([address] 9/12/2014    Fibromyalgia     History of anemia 03/12/2012    GI blood loss    History of drug overdose 09/2016    narcotics    History of encephalopathy 02/04/2010    History of fracture of hip 03/10/2012    femoral neck    History of mastectomy     bilat    Hypertension     controlled with medication    Immunosuppressed status ([address] 6/20/2014    Long term (current) use of anticoagulants     MCTD (mixed connective tissue disease) ([address]     seeing Dr. Bi Burger, on methotrexate and Plaquenil    Nausea & vomiting     Osteoarthritis     Other ill-defined cerebrovascular disease     Pain in joint, pelvic region and thigh     Personal history of fall     Postmenopausal atrophic vaginitis     Primary localized osteoarthrosis, pelvic region and thigh     PUD (peptic ulcer disease) 2009    PVD (peripheral vascular disease) ([address]     Reflux esophagitis     S/P AVR (aortic valve replacement)     [facility] [place]    Seasonal allergic rhinitis due to pollen     Seizures ([address]     2 years ago     Sleep disturbance     Symptomatic menopausal or female climacteric states     Thromboembolus ([address] 2007    right arm , right leg    Unspecified disorder of liver     Urticaria     Mild on back      Past Surgical History:   Procedure Laterality Date    ABDOMEN SURGERY [address]    exploratory tubes & ovaries removed    EGD  3/29/2019         HX ACL RECONSTRUCTION Left 1979    HX AORTIC VALVE REPLACEMENT  2004    [place] Aortic valve Valve    HX AORTIC VALVE REPLACEMENT      HX CARPAL TUNNEL RELEASE Right 1995    HX CATARACT REMOVAL Right 3/20/2014    [facility]    HX CHOLECYSTECTOMY      HX COLONOSCOPY  9/25/13    Internal Hemorrhoids. Bx reveals Lymphocytic colitis. No further colonscopies will be scheduled due to concominant medical problems.  HX COLONOSCOPY  2010    Internal hemorrhoids. Repeated 2013, no further colonoscopies planned. [place].  HX HYSTERECTOMY  1975    HX MASTECTOMY Bilateral 2000    Precancerous. [place].  HX OTHER SURGICAL      right brachial  thrombosis    HX TONSIL AND ADENOIDECTOMY      HX TONSILLECTOMY      HX TUMOR REMOVAL  1986    Malignant tumor of colon.  REVISE TOTAL HIP REPLACEMENT Right 1/17/13    TOTAL HIP ARTHROPLASTY Right 12/2012, 3/11/12, 2/2013     Allergies   Allergen Reactions    Latex Anaphylaxis    Bee Sting [Sting, Bee] Shortness of Breath     anaphylatics    Adhesive Rash     Including Coban wrap    Diflucan [Fluconazole] Other (comments)     Increased INR per pt       Family History   Problem Relation Age of Onset    Stroke Mother     Heart Disease Father     Hypertension Father     Heart Disease Sister     Seizures Brother         No current facility-administered medications for this encounter. No current outpatient medications on file.      Facility-Administered Medications Ordered in Other Encounters   Medication Dose Route Frequency    WARFARIN (COUMADIN) - Pharmacy to Dose   Other QPM    furosemide (LASIX) 100 mg in 0.9% sodium chloride 100 mL infusion  10 mg/hr IntraVENous CONTINUOUS    acetaminophen (TYLENOL) tablet 1,000 mg  1,000 mg Oral Q8H    dicyclomine (BENTYL) capsule 10 mg  10 mg Oral DAILY PRN    folic acid (FOLVITE) tablet 1 mg  1 mg Oral DAILY    HYDROcodone-acetaminophen (NORCO) 5-325 mg per tablet 1 Tab  1 Tab Oral TID PRN    hydroxychloroquine (PLAQUENIL) tablet 300 mg  300 mg Oral DAILY    levETIRAcetam (KEPPRA) tablet 500 mg  500 mg Oral BID    levothyroxine (SYNTHROID) tablet 125 mcg  125 mcg Oral ACB    nitroglycerin (NITROSTAT) tablet 0.4 mg  0.4 mg SubLINGual Q5MIN PRN    pantoprazole (PROTONIX) tablet 40 mg  40 mg Oral ACB    polyvinyl alcohol (LIQUIFILM TEARS) 1.4 % ophthalmic solution 1 Drop  1 Drop Both Eyes PRN    predniSONE (DELTASONE) tablet 5 mg  5 mg Oral DAILY WITH BREAKFAST    famotidine (PEPCID) tablet 20 mg  20 mg Oral QPM    sulfaSALAzine (AZULFIDINE) tablet 1,000 mg  1,000 mg Oral TID WITH MEALS    sodium chloride (NS) flush 5-40 mL  5-40 mL IntraVENous Q8H    sodium chloride (NS) flush 5-40 mL  5-40 mL IntraVENous PRN    tuberculin injection 5 Units  5 Units IntraDERMal ONCE    venlafaxine-SR (EFFEXOR-XR) capsule 225 mg  225 mg Oral DAILY WITH BREAKFAST    albuterol (PROVENTIL VENTOLIN) nebulizer solution 2.5 mg  2.5 mg Nebulization Q6H PRN       Review of Systems   Unable to perform ROS: Mental acuity        Physical Exam  There were no vitals filed for this visit. Physical Exam:  Physical Exam   Constitutional: She appears unhealthy. She has a sickly appearance. HENT:   Head: Normocephalic. Eyes:   Pupils sluggish   Pulmonary/Chest: Accessory muscle usage present. Tachypnea noted. She has rales. Abdominal: Soft. Bowel sounds are normal.   Musculoskeletal: Normal range of motion. Neurological: She is disoriented. Skin: Skin is warm and dry. Psychiatric: She exhibits disordered thought content. Cardiographics    Telemetry: normal sinus rhythm  ECG: normal sinus rhythm, nonspecific ST and T waves changes  Echocardiogram:     Left ventricle: Systolic function was normal. Ejection fraction was  estimated in the range of 20 % to 25 %. LV base contracts normally apical  balloon picture c/w Takotsubo CM. There was akinesis of the apical anterior,  apical inferior, apical septal, apical lateral, and apical wall(s). There was  hypokinesis of the mid-apical anterior, mid anteroseptal, mid inferoseptal,  mid-apical inferior, mid inferolateral, and mid anterolateral wall(s). There  was dyskinesis of the apical wall(s). -  Inferior vena cava, hepatic veins: The respirophasic change in diameter   Was more than 50%. -  Aortic valve: A mechanical prosthesis was present. The aortic valve area   By the continuity equation was 1.75 cm2.    -  Mitral valve: There was mild regurgitation. -  Tricuspid valve: There was mild regurgitation. Labs:   Recent Labs     06/17/19  1011 06/17/19  0607 06/16/19  1454 06/16/19  1453   NA  --  136 134*  --    K  --  3.0* 3.5  --    BUN  --  16 20  --    CREA  --  0.65 0.78  --    GLU  --  89 144*  --    WBC  --  7.7 9.0  --    HGB  --  11.6* 11.9  --    HCT  --  36.7 37.5  --    PLT  --  134* 158  --    INR >6.8*  --   --  >6.8*        Assessment/Plan:     Assessment:      Principal Problem:    Acute respiratory failure with hypoxia -- on airvo. Pulmonary following      Acute systolic congestive heart failure -- EF 20-25% with global hypokinesis c/w Takotsubo. Will need BB and ACE when BP tolerates. Strict I/O and daily weight. CXR is atypical for volume overload. The patient does does not appear volume overloaded. No LE edema or JVD       Elevated troponin -- trend troponin. Given INR >6.8, AMS, and poor condition, not currently a candidate for ischemic work up. Will reevaluate pending clinical course       Chronic pain       S/P AVR (aortic valve replacement)-- INR supra therapeutic, monitor INR daily. HTN (hypertension)---- currently hypotensive       GERD (gastroesophageal reflux disease)-- on PPI       Immunosuppressed status -- per primary       Thank you very much for this referral. We appreciate the opportunity to participate in this patient's care. We will follow along with above stated plan.     Anna Mitchell NP  Consulting MD: Reilly Parry complains of pain/discomfort

## 2023-04-17 ENCOUNTER — HOSPITAL ENCOUNTER (OUTPATIENT)
Dept: DATA CONVERSION | Facility: HOSPITAL | Age: 78
End: 2023-04-17
Attending: INTERNAL MEDICINE | Admitting: INTERNAL MEDICINE
Payer: MEDICARE

## 2023-04-17 DIAGNOSIS — K21.00 GASTRO-ESOPHAGEAL REFLUX DISEASE WITH ESOPHAGITIS, WITHOUT BLEEDING: ICD-10-CM

## 2023-04-17 DIAGNOSIS — I49.9 CARDIAC ARRHYTHMIA, UNSPECIFIED: ICD-10-CM

## 2023-04-17 DIAGNOSIS — K29.50 UNSPECIFIED CHRONIC GASTRITIS WITHOUT BLEEDING: ICD-10-CM

## 2023-04-17 DIAGNOSIS — K29.80 DUODENITIS WITHOUT BLEEDING: ICD-10-CM

## 2023-04-17 DIAGNOSIS — K21.9 GASTRO-ESOPHAGEAL REFLUX DISEASE WITHOUT ESOPHAGITIS: ICD-10-CM

## 2023-04-17 DIAGNOSIS — K26.9 DUODENAL ULCER, UNSPECIFIED AS ACUTE OR CHRONIC, WITHOUT HEMORRHAGE OR PERFORATION: ICD-10-CM

## 2023-04-17 DIAGNOSIS — Z79.84 LONG TERM (CURRENT) USE OF ORAL HYPOGLYCEMIC DRUGS: ICD-10-CM

## 2023-04-17 DIAGNOSIS — E11.9 TYPE 2 DIABETES MELLITUS WITHOUT COMPLICATIONS (MULTI): ICD-10-CM

## 2023-04-17 DIAGNOSIS — K44.9 DIAPHRAGMATIC HERNIA WITHOUT OBSTRUCTION OR GANGRENE: ICD-10-CM

## 2023-04-17 DIAGNOSIS — K22.2 ESOPHAGEAL OBSTRUCTION: ICD-10-CM

## 2023-04-17 LAB — POCT GLUCOSE: 112 MG/DL (ref 74–99)

## 2023-04-21 LAB
COMPLETE PATHOLOGY REPORT: NORMAL
CONVERTED CLINICAL DIAGNOSIS-HISTORY: NORMAL
CONVERTED FINAL DIAGNOSIS: NORMAL
CONVERTED FINAL REPORT PDF LINK TO COPY AND PASTE: NORMAL
CONVERTED GROSS DESCRIPTION: NORMAL

## 2023-06-03 LAB — SARS-COV-2 RESULT: NOT DETECTED

## 2023-12-11 RX ORDER — DILTIAZEM HYDROCHLORIDE 180 MG/1
180 CAPSULE, EXTENDED RELEASE ORAL
COMMUNITY
Start: 2022-08-11

## 2023-12-11 RX ORDER — METFORMIN HYDROCHLORIDE 500 MG/1
500 TABLET ORAL 2 TIMES DAILY
COMMUNITY

## 2023-12-11 RX ORDER — LISINOPRIL 2.5 MG/1
2.5 TABLET ORAL DAILY
COMMUNITY

## 2023-12-11 RX ORDER — SIMVASTATIN 40 MG/1
40 TABLET, FILM COATED ORAL NIGHTLY
COMMUNITY

## 2023-12-11 RX ORDER — PANTOPRAZOLE SODIUM 40 MG/1
40 TABLET, DELAYED RELEASE ORAL DAILY
COMMUNITY

## 2023-12-11 RX ORDER — LATANOPROST 50 UG/ML
2 SOLUTION/ DROPS OPHTHALMIC NIGHTLY
COMMUNITY

## 2023-12-11 RX ORDER — OXYBUTYNIN CHLORIDE 5 MG/1
5 TABLET, EXTENDED RELEASE ORAL DAILY
COMMUNITY

## 2023-12-11 RX ORDER — LANOLIN ALCOHOL/MO/W.PET/CERES
1 CREAM (GRAM) TOPICAL 2 TIMES DAILY
COMMUNITY
Start: 2022-11-09

## 2023-12-11 RX ORDER — SUCRALFATE 1 G/1
1 TABLET ORAL
COMMUNITY
Start: 2022-11-02

## 2023-12-12 ENCOUNTER — ANESTHESIA EVENT (OUTPATIENT)
Dept: GASTROENTEROLOGY | Facility: HOSPITAL | Age: 78
End: 2023-12-12
Payer: MEDICARE

## 2023-12-12 RX ORDER — SODIUM CHLORIDE, SODIUM LACTATE, POTASSIUM CHLORIDE, CALCIUM CHLORIDE 600; 310; 30; 20 MG/100ML; MG/100ML; MG/100ML; MG/100ML
100 INJECTION, SOLUTION INTRAVENOUS CONTINUOUS
OUTPATIENT
Start: 2023-12-12

## 2023-12-13 ENCOUNTER — ANESTHESIA (OUTPATIENT)
Dept: GASTROENTEROLOGY | Facility: HOSPITAL | Age: 78
End: 2023-12-13
Payer: MEDICARE

## 2023-12-13 ENCOUNTER — HOSPITAL ENCOUNTER (OUTPATIENT)
Dept: GASTROENTEROLOGY | Facility: HOSPITAL | Age: 78
Discharge: HOME | End: 2023-12-13
Payer: MEDICARE

## 2023-12-13 VITALS
SYSTOLIC BLOOD PRESSURE: 112 MMHG | OXYGEN SATURATION: 94 % | HEIGHT: 62 IN | TEMPERATURE: 97.2 F | WEIGHT: 115.3 LBS | HEART RATE: 84 BPM | BODY MASS INDEX: 21.22 KG/M2 | RESPIRATION RATE: 18 BRPM | DIASTOLIC BLOOD PRESSURE: 68 MMHG

## 2023-12-13 DIAGNOSIS — K21.9 GASTRO-ESOPHAGEAL REFLUX DISEASE WITHOUT ESOPHAGITIS: Primary | ICD-10-CM

## 2023-12-13 DIAGNOSIS — K22.10 ULCER OF ESOPHAGUS WITHOUT BLEEDING: ICD-10-CM

## 2023-12-13 DIAGNOSIS — Z87.19 PERSONAL HISTORY OF OTHER DISEASES OF THE DIGESTIVE SYSTEM: ICD-10-CM

## 2023-12-13 DIAGNOSIS — K25.9 MULTIPLE GASTRIC ULCERS: ICD-10-CM

## 2023-12-13 PROBLEM — E78.5 HYPERLIPIDEMIA: Status: ACTIVE | Noted: 2023-12-13

## 2023-12-13 PROBLEM — I10 PRIMARY HYPERTENSION: Status: ACTIVE | Noted: 2023-12-13

## 2023-12-13 PROBLEM — E11.9 DIABETES MELLITUS, TYPE 2 (MULTI): Status: ACTIVE | Noted: 2023-12-13

## 2023-12-13 LAB — GLUCOSE BLD MANUAL STRIP-MCNC: 102 MG/DL (ref 74–99)

## 2023-12-13 PROCEDURE — 3700000002 HC GENERAL ANESTHESIA TIME - EACH INCREMENTAL 1 MINUTE

## 2023-12-13 PROCEDURE — 2500000004 HC RX 250 GENERAL PHARMACY W/ HCPCS (ALT 636 FOR OP/ED): Performed by: NURSE ANESTHETIST, CERTIFIED REGISTERED

## 2023-12-13 PROCEDURE — 0653T EGD FLX TRANSNASAL BX 1/MLT: CPT | Performed by: SURGERY

## 2023-12-13 PROCEDURE — 88342 IMHCHEM/IMCYTCHM 1ST ANTB: CPT | Performed by: PATHOLOGY

## 2023-12-13 PROCEDURE — 3700000001 HC GENERAL ANESTHESIA TIME - INITIAL BASE CHARGE

## 2023-12-13 PROCEDURE — 7100000010 HC PHASE TWO TIME - EACH INCREMENTAL 1 MINUTE

## 2023-12-13 PROCEDURE — 2500000004 HC RX 250 GENERAL PHARMACY W/ HCPCS (ALT 636 FOR OP/ED): Performed by: SURGERY

## 2023-12-13 PROCEDURE — 88305 TISSUE EXAM BY PATHOLOGIST: CPT | Mod: TC,SUR,ELYLAB | Performed by: SURGERY

## 2023-12-13 PROCEDURE — 88342 IMHCHEM/IMCYTCHM 1ST ANTB: CPT | Mod: TC,SUR,ELYLAB | Performed by: SURGERY

## 2023-12-13 PROCEDURE — 7100000009 HC PHASE TWO TIME - INITIAL BASE CHARGE

## 2023-12-13 PROCEDURE — 82947 ASSAY GLUCOSE BLOOD QUANT: CPT

## 2023-12-13 PROCEDURE — 88305 TISSUE EXAM BY PATHOLOGIST: CPT | Performed by: PATHOLOGY

## 2023-12-13 PROCEDURE — 2500000005 HC RX 250 GENERAL PHARMACY W/O HCPCS: Performed by: NURSE ANESTHETIST, CERTIFIED REGISTERED

## 2023-12-13 RX ORDER — PANTOPRAZOLE SODIUM 40 MG/1
40 TABLET, DELAYED RELEASE ORAL ONCE
Status: SHIPPED | OUTPATIENT
Start: 2023-12-13

## 2023-12-13 RX ORDER — SODIUM CHLORIDE, SODIUM LACTATE, POTASSIUM CHLORIDE, CALCIUM CHLORIDE 600; 310; 30; 20 MG/100ML; MG/100ML; MG/100ML; MG/100ML
100 INJECTION, SOLUTION INTRAVENOUS CONTINUOUS
Status: DISCONTINUED | OUTPATIENT
Start: 2023-12-13 | End: 2023-12-14 | Stop reason: HOSPADM

## 2023-12-13 RX ORDER — LIDOCAINE HYDROCHLORIDE 20 MG/ML
INJECTION, SOLUTION INFILTRATION; PERINEURAL AS NEEDED
Status: DISCONTINUED | OUTPATIENT
Start: 2023-12-13 | End: 2023-12-13

## 2023-12-13 RX ORDER — PROPOFOL 10 MG/ML
INJECTION, EMULSION INTRAVENOUS AS NEEDED
Status: DISCONTINUED | OUTPATIENT
Start: 2023-12-13 | End: 2023-12-13

## 2023-12-13 RX ADMIN — PROPOFOL 50 MG: 10 INJECTION, EMULSION INTRAVENOUS at 11:44

## 2023-12-13 RX ADMIN — LIDOCAINE HYDROCHLORIDE 60 MG: 20 INJECTION, SOLUTION INFILTRATION; PERINEURAL at 11:39

## 2023-12-13 RX ADMIN — SODIUM CHLORIDE, POTASSIUM CHLORIDE, SODIUM LACTATE AND CALCIUM CHLORIDE 100 ML/HR: 600; 310; 30; 20 INJECTION, SOLUTION INTRAVENOUS at 09:03

## 2023-12-13 RX ADMIN — PROPOFOL 100 MG: 10 INJECTION, EMULSION INTRAVENOUS at 11:39

## 2023-12-13 SDOH — HEALTH STABILITY: MENTAL HEALTH: CURRENT SMOKER: 0

## 2023-12-13 ASSESSMENT — PAIN SCALES - GENERAL
PAINLEVEL_OUTOF10: 0 - NO PAIN
PAINLEVEL_OUTOF10: 1
PAIN_LEVEL: 0
PAINLEVEL_OUTOF10: 0 - NO PAIN

## 2023-12-13 ASSESSMENT — PAIN DESCRIPTION - DESCRIPTORS: DESCRIPTORS: DULL

## 2023-12-13 ASSESSMENT — COLUMBIA-SUICIDE SEVERITY RATING SCALE - C-SSRS
1. IN THE PAST MONTH, HAVE YOU WISHED YOU WERE DEAD OR WISHED YOU COULD GO TO SLEEP AND NOT WAKE UP?: NO
6. HAVE YOU EVER DONE ANYTHING, STARTED TO DO ANYTHING, OR PREPARED TO DO ANYTHING TO END YOUR LIFE?: NO
2. HAVE YOU ACTUALLY HAD ANY THOUGHTS OF KILLING YOURSELF?: NO

## 2023-12-13 ASSESSMENT — PAIN - FUNCTIONAL ASSESSMENT
PAIN_FUNCTIONAL_ASSESSMENT: 0-10

## 2023-12-13 NOTE — ANESTHESIA POSTPROCEDURE EVALUATION
Patient: Victoria Rodriguez    Procedure Summary       Date: 12/13/23 Room / Location: [facility]    Anesthesia Start: 1135 Anesthesia Stop:     Procedure: EGD Diagnosis:       Ulcer of esophagus without bleeding      Personal history of other diseases of the digestive system      Gastro-esophageal reflux disease without esophagitis    Scheduled Providers: Arun Reyes MD; Renae Holt RN Responsible Provider: Lucas Frey MD    Anesthesia Type: MAC ASA Status: 2            Anesthesia Type: MAC    Vitals Value Taken Time   /87 12/13/23 1147   Temp 36 12/13/23 1147   Pulse 76 12/13/23 1147   Resp 16 12/13/23 1147   SpO2 99 12/13/23 1147       Anesthesia Post Evaluation    Patient location during evaluation: PACU  Patient participation: complete - patient participated  Level of consciousness: awake and alert  Pain score: 0  Pain management: adequate  Airway patency: patent  Cardiovascular status: acceptable  Respiratory status: acceptable  Hydration status: acceptable  Postoperative Nausea and Vomiting: none        No notable events documented.     weakness

## 2023-12-13 NOTE — DISCHARGE INSTRUCTIONS
Upper GI Endoscopy Discharge Instructions    About this topic  This procedure is done to view your upper gastrointestinal (GI) tract. This includes your throat and food pipe (esophagus). It also includes your stomach and the first part of the small bowel. Some people have this test for problems like coughing or throwing up blood. Other people may be having bad belly pain or blood in their stool. You may be having trouble swallowing or problems with acid reflux.  Doctors often use this test to look for problems like:  Ulcers  Cancer or tumor growths  Internal bleeding  Swelling  Inflammation  Infection  Obregon's esophagus  Gastroesophageal reflux disease or GERD  Swallowing problems    What care is needed at home?  Ask your doctor what you need to do when you go home. Make sure you ask questions if you do not understand what the doctor says. This way you will know what you need to do.  Your throat may feel sore. Your belly may also feel bloated. Your doctor may give you drugs to help with pain.  Talk to your doctor about when it is safe for you to go back to eating your normal diet and taking your drugs. You can drink fluid once the numbing drugs in your throat wear off.  What follow-up care is needed?  Your doctor may ask you to make visits to the office to check on your progress. Be sure to keep these visits.  The results of this test may help your doctor understand what kind of problem you have with your upper GI tract. Together you can make a plan for more care.  What drugs may be needed?  The doctor may order drugs to:  Help with pain  Decrease the acid in your stomach  Will physical activity be limited?  You may need to limit your activity for the rest of the day. After that, you will likely be able to go back to your normal activities.  What changes to diet are needed?  Soft foods like pudding or soups may be easier to eat at first. Ask your doctor if you need to make any changes to your diet.  What problems  could happen?  Painful swallowing  Upset stomach  Injury to food pipe  Throwing up  Tear in the esophagus  When do I need to call the doctor?  Signs of infection. These include a fever of 100.4°F (38°C) or higher, chills.  Very bad belly pain  Throwing up blood  Your belly is hard and swollen  Trouble swallowing or breathing  Upset stomach and throwing up  Bloody or black tarry stools  Cough, shortness of breath, or chest pain  A crunching feeling under the skin in your neck  You are not feeling better in 2 to 3 days or you are feeling worse  Teach Back: Helping You Understand  The Teach Back Method helps you understand the information we are giving you. After you talk with the staff, tell them in your own words what you learned. This helps to make sure the staff has described each thing clearly. It also helps to explain things that may have been confusing. Before going home, make sure you can do these:  I can tell you about my procedure.  I can tell you what changes I need to make with my diet or drugs.  I can tell you what I will do if I have very bad belly pain, throwing up blood, or my belly is hard and swollen.  Where can I learn more?  [employer]  https://www.gastro.org/practice-guidance/gi-patient-center/topic/upper-gi-endoscopy  Last Reviewed Date

## 2023-12-13 NOTE — ANESTHESIA PREPROCEDURE EVALUATION
Patient: Victoria Rodriguez    Procedure Information       Date/Time: 12/13/23 0930    Scheduled providers: Arun Reyes MD; Renae Holt RN    Procedure: EGD    Location: [facility]            Relevant Problems   Cardiovascular   (+) Hyperlipidemia   (+) Primary hypertension      Endocrine   (+) Diabetes mellitus, type 2 (CMS/HCC)       Clinical information reviewed:   Tobacco  Allergies  Meds   Med Hx  Surg Hx  OB Status  Fam Hx  Soc   Hx        NPO Detail:  NPO/Void Status  Carbonhydrate Drink Given Prior to Surgery? : N  Date of Last Liquid: 12/12/23  Time of Last Liquid: 2100  Date of Last Solid: 12/12/23  Time of Last Solid: 1800  Last Intake Type: Clear fluids  Time of Last Void: 0830         Physical Exam    Airway  Mallampati: II  TM distance: >3 FB  Neck ROM: full     Cardiovascular    Dental - normal exam     Pulmonary    Abdominal        Anesthesia Plan    ASA 2     MAC     The patient is not a current smoker.  Patient was not previously instructed to abstain from smoking on day of procedure.  Patient did not smoke on day of procedure.    intravenous induction   Anesthetic plan and risks discussed with patient.    Plan discussed with CRNA.

## 2023-12-13 NOTE — NURSING NOTE
Patient tolerated procedure well. Appears comfortable with no complaints of pain. VS stable. Arousable prior to transport. Patient transported to [facility] via cart.  Report called  [facility] rn  . Handoff completed

## 2023-12-13 NOTE — NURSING NOTE
Huddle and Timeout completed together with team. Patient wristband and ALCON information verified.

## 2023-12-13 NOTE — H&P
History Of Present Illness  77-year-old patient who underwent laparoscopic hiatal hernia repair with toupet wrap on June 6. Prior to surgery, she was noted to have gastric ulcers and LA grade C esophagitis. Heartburn and acid reflux have resolved. She has been doing well. She is on Protonix and Carafate.      Past Medical History  Past Medical History:   Diagnosis Date    Basal cell carcinoma     face    Diabetes (CMS/HCC)     type 2    Esophagitis     High cholesterol     Osteopenia     Palpitations     Uses hearing aid     Wears glasses        Surgical History  Past Surgical History:   Procedure Laterality Date    CARPAL TUNNEL RELEASE Right     CATARACT EXTRACTION, BILATERAL      COLONOSCOPY      CYSTOCELE REPAIR      rectocele    ESOPHAGOGASTRODUODENOSCOPY      Esophagogastroduodenoscopy    EYE SURGERY Left     retinal puckering    HIATAL HERNIA REPAIR      HYSTERECTOMY      TONSILLECTOMY      Tonsillectomy        Social History  She reports that she quit smoking about 16 years ago. Her smoking use included cigarettes. She has a 20.00 pack-year smoking history. She has never used smokeless tobacco. She reports current alcohol use. She reports that she does not use drugs.    Family History  No family history on file.     Allergies  Patient has no known allergies.    Review of Systems     Physical Exam   Constitutional: no acute distress, well appearing and well nourished  Pulmonary: normal respiratory effort; clear to auscultation bilaterally, no wheezes or bronchi   Cardiovascular: regular rate and rhythm, no murmurs or extra-heart sounds; pedal pulses are normal; no extremities edema or varicosities, no peripheral edema  Abdomen: soft, non-tender, non-distended; no organomegaly; no peritoneal sign, no hernia  Musculoskeletal: digits and nails normal without clubbing or cyanosis; Joints, bones and muscles are normal with normal range of motion; muscle strength/tone is normal  Last Recorded Vitals  Blood pressure  "133/79, pulse 89, temperature 36.1 °C (97 °F), temperature source Temporal, resp. rate 17, height 1.575 m (5' 2\"), weight 52.3 kg (115 lb 4.8 oz), SpO2 99 %.    Relevant Results           Assessment/Plan   Patient continues to do well. She will continue the Protonix and Carafate; plan is to repeat EGD to document resolution of the ulcers and the LA Grade C esophagitis. I explained to her the procedure, risks and complications; call questions answered and willing to proceed  Arun Reyes MD    "

## 2023-12-18 ENCOUNTER — OFFICE VISIT (OUTPATIENT)
Dept: SURGERY | Facility: CLINIC | Age: 78
End: 2023-12-18
Payer: MEDICARE

## 2023-12-18 VITALS
BODY MASS INDEX: 22.08 KG/M2 | SYSTOLIC BLOOD PRESSURE: 112 MMHG | DIASTOLIC BLOOD PRESSURE: 68 MMHG | HEIGHT: 62 IN | WEIGHT: 120 LBS

## 2023-12-18 DIAGNOSIS — K25.9 MULTIPLE GASTRIC ULCERS: Primary | ICD-10-CM

## 2023-12-18 PROCEDURE — 1126F AMNT PAIN NOTED NONE PRSNT: CPT | Performed by: SURGERY

## 2023-12-18 PROCEDURE — 99213 OFFICE O/P EST LOW 20 MIN: CPT | Performed by: SURGERY

## 2023-12-18 PROCEDURE — 3078F DIAST BP <80 MM HG: CPT | Performed by: SURGERY

## 2023-12-18 PROCEDURE — 1159F MED LIST DOCD IN RCRD: CPT | Performed by: SURGERY

## 2023-12-18 PROCEDURE — 1036F TOBACCO NON-USER: CPT | Performed by: SURGERY

## 2023-12-18 PROCEDURE — 1160F RVW MEDS BY RX/DR IN RCRD: CPT | Performed by: SURGERY

## 2023-12-18 PROCEDURE — 3074F SYST BP LT 130 MM HG: CPT | Performed by: SURGERY

## 2023-12-18 RX ORDER — PANTOPRAZOLE SODIUM 40 MG/1
40 TABLET, DELAYED RELEASE ORAL 2 TIMES DAILY
Qty: 60 TABLET | Refills: 3 | Status: SHIPPED | OUTPATIENT
Start: 2023-12-18 | End: 2024-12-17

## 2023-12-18 NOTE — PROGRESS NOTES
Subjective   Patient ID: Victoria Rodriguez is a 78 y.o. female who presents for Follow-up (Meds not helping for ulcers).  HPI  78-year-old female patient who underwent EGD with biopsies on December 13.  3 small prepyloric ulcers were found with gastric polyps.  Toupet wrap was intact.  LA grade C esophagitis had resolved; she was started on Protonix 40 mg twice daily and Carafate 1 g 3 times daily.  She is doing well.  Denies heartburn and acid reflux.  Not having excessive phlegm in the back of her throat when she lays down.      Objective   Physical Exam  Constitutional: no acute distress, well appearing and well nourished  Pulmonary: normal respiratory effort; clear to auscultation bilaterally  Cardiovascular: regular rate and rhythm, no murmurs or extra-heart sounds  Abdomen: soft, non-tender, non-distended  Musculoskeletal: digits and nails normal without clubbing or cyanosis  Skin: normal without rashes or lesion  Neurologic: cranial nerve II-XII intact grossly; normal gait  Psychiatric: oriented to person, place and time  Assessment/Plan   I discussed with patient the endoscopic findings.  Pathology is pending.  For the 3 prepyloric ulcers, she will take the pantoprazole 40 mg twice daily for 3 months and Carafate 1 g 3 times daily for 2 months. She is moving back to [place]. She will need repeat EGD in 3 to 6 months to document resolution of the ulcer.       Arun Reyes MD 12/18/23 11:21 AM

## 2023-12-18 NOTE — PATIENT INSTRUCTIONS
Continue on the Protonix 40 mg twice daily for 3 months, Carafate 1 g 3 times a day for 2 months; you will need repeat EGD in 3 to 6 months to document resolution of the ulcers

## 2023-12-22 LAB
LAB AP ASR DISCLAIMER: NORMAL
LABORATORY COMMENT REPORT: NORMAL
PATH REPORT.COMMENTS IMP SPEC: NORMAL
PATH REPORT.FINAL DX SPEC: NORMAL
PATH REPORT.GROSS SPEC: NORMAL
PATH REPORT.TOTAL CANCER: NORMAL

## 2023-12-27 ENCOUNTER — OFFICE VISIT (OUTPATIENT)
Dept: CARDIOLOGY | Facility: CLINIC | Age: 78
End: 2023-12-27
Payer: MEDICARE

## 2023-12-27 VITALS
HEIGHT: 64 IN | WEIGHT: 118 LBS | HEART RATE: 60 BPM | SYSTOLIC BLOOD PRESSURE: 112 MMHG | DIASTOLIC BLOOD PRESSURE: 60 MMHG | BODY MASS INDEX: 20.14 KG/M2

## 2023-12-27 DIAGNOSIS — E11.9 TYPE 2 DIABETES MELLITUS WITHOUT COMPLICATION, UNSPECIFIED WHETHER LONG TERM INSULIN USE (MULTI): ICD-10-CM

## 2023-12-27 DIAGNOSIS — I49.1 PAC (PREMATURE ATRIAL CONTRACTION): ICD-10-CM

## 2023-12-27 DIAGNOSIS — E78.2 MIXED HYPERLIPIDEMIA: ICD-10-CM

## 2023-12-27 DIAGNOSIS — I47.11 INAPPROPRIATE SINUS TACHYCARDIA (CMS-HCC): ICD-10-CM

## 2023-12-27 DIAGNOSIS — Z87.891 FORMER CIGARETTE SMOKER: ICD-10-CM

## 2023-12-27 DIAGNOSIS — R00.2 PALPITATIONS: ICD-10-CM

## 2023-12-27 PROCEDURE — 99214 OFFICE O/P EST MOD 30 MIN: CPT | Performed by: INTERNAL MEDICINE

## 2023-12-27 PROCEDURE — 3078F DIAST BP <80 MM HG: CPT | Performed by: INTERNAL MEDICINE

## 2023-12-27 PROCEDURE — 1126F AMNT PAIN NOTED NONE PRSNT: CPT | Performed by: INTERNAL MEDICINE

## 2023-12-27 PROCEDURE — 1159F MED LIST DOCD IN RCRD: CPT | Performed by: INTERNAL MEDICINE

## 2023-12-27 PROCEDURE — 1160F RVW MEDS BY RX/DR IN RCRD: CPT | Performed by: INTERNAL MEDICINE

## 2023-12-27 PROCEDURE — 1036F TOBACCO NON-USER: CPT | Performed by: INTERNAL MEDICINE

## 2023-12-27 PROCEDURE — 3074F SYST BP LT 130 MM HG: CPT | Performed by: INTERNAL MEDICINE

## 2023-12-27 RX ORDER — MOLNUPIRAVIR 200 MG/1
CAPSULE ORAL
COMMUNITY
Start: 2023-08-19

## 2023-12-27 RX ORDER — DOCUSATE SODIUM 100 MG/1
100 CAPSULE, LIQUID FILLED ORAL 2 TIMES DAILY
COMMUNITY
Start: 2023-06-07

## 2023-12-27 RX ORDER — OFLOXACIN 3 MG/ML
SOLUTION/ DROPS OPHTHALMIC
COMMUNITY
Start: 2023-02-10

## 2023-12-27 RX ORDER — PREDNISOLONE ACETATE 10 MG/ML
SUSPENSION/ DROPS OPHTHALMIC
COMMUNITY
Start: 2023-03-01

## 2023-12-27 RX ORDER — HYDROCODONE BITARTRATE AND ACETAMINOPHEN 5; 325 MG/1; MG/1
TABLET ORAL
COMMUNITY
Start: 2023-06-07

## 2023-12-27 RX ORDER — ALENDRONATE SODIUM 70 MG/1
70 TABLET ORAL
COMMUNITY
Start: 2023-11-14

## 2023-12-27 RX ORDER — SIMETHICONE 80 MG
TABLET,CHEWABLE ORAL
COMMUNITY
Start: 2023-06-07

## 2023-12-27 NOTE — PROGRESS NOTES
CARDIOLOGY OFFICE VISIT      CHIEF COMPLAINT  1 year follow up    HISTORY OF PRESENT ILLNESS  The patient states she has been doing well.  She denies chest discomfort or symptoms of myocardial ischemia.  She denies any significant dyspnea.  She denies any severe prolonged palpitations, pacing and syncope.  She is Christy current medication eye problems.  She states she did have hiatal hernia surgery in  of this year without any cardiac issues.  She states she is going to be moving to [place].      Impression:  History of inappropriate sinus tachycardia  Palpitations secondary to frequent PACs  Hyperlipidemia  Former smoker  Family history of cardiac pathology, possible myocardial infarction  Diabetes mellitus type 2  Hiatal hernia surgery 2023     Please excuse any errors in grammar or translation related to this dictation. Voice recognition software was utilized to prepare this document.       Past Medical History  Past Medical History:   Diagnosis Date    Basal cell carcinoma     face    Diabetes (CMS/[facility])     type 2    Esophagitis     High cholesterol     Osteopenia     Palpitations     Uses hearing aid     Wears glasses        Social History  Social History     Tobacco Use    Smoking status: Former     Packs/day: 0.50     Years: 40.00     Additional pack years: 0.00     Total pack years: 20.00     Types: Cigarettes     Quit date:      Years since quittin.9    Smokeless tobacco: Never   Vaping Use    Vaping Use: Never used   Substance Use Topics    Alcohol use: Yes     Comment: rarely    Drug use: Never       Family History     Family History   Problem Relation Name Age of Onset    Heart failure Mother          Allergies:  No Known Allergies     Outpatient Medications:  Current Outpatient Medications   Medication Instructions    alendronate (FOSAMAX) 70 mg, oral, Weekly    dilTIAZem ER (TIAZAC) 180 mg, oral, Daily RT    docusate sodium (COLACE) 100 mg, oral, 2 times daily     HYDROcodone-acetaminophen (Norco) 5-325 mg tablet TAKE ONE TABLET BY MOUTH EVERY 6 HOURS AS NEEDED FOR MODERATE PAIN (PAIN SCALE 4 TO 6)    Lagevrio, EUA, 200 mg capsule     latanoprost (Xalatan) 0.005 % ophthalmic solution 2 drops, Both Eyes, Nightly    lisinopril 2.5 mg, oral, Daily    magnesium oxide (Mag-Ox) 400 mg (241.3 mg magnesium) tablet 1 tablet, oral, 2 times daily    MAGNESIUM OXIDE ORAL oral    metFORMIN (GLUCOPHAGE) 500 mg, oral, 2 times daily    ofloxacin (Ocuflox) 0.3 % ophthalmic solution INSTILL 1 DROP INTO THE LEFT EYE FOUR TIMES A DAY    oxybutynin XL (DITROPAN-XL) 5 mg, oral, Daily    pantoprazole (PROTONIX) 40 mg, oral, Daily    pantoprazole (PROTONIX) 40 mg, oral, 2 times daily, Do not crush, chew, or split.    prednisoLONE acetate (Pred-Forte) 1 % ophthalmic suspension INSTILL 1 DROP INTO THE LEFT EYE FOUR TIMES A DAY    simethicone (Mylicon) 80 mg chewable tablet CHEW AND SWALLOW ONE TABLET BY MOUTH EVERY 8 HOURS    simvastatin (ZOCOR) 40 mg, oral, Nightly    sucralfate (CARAFATE) 1 g, oral, 2 times daily before meals          REVIEW OF SYSTEMS  Review of Systems   All other systems reviewed and are negative.        VITALS  Vitals:    12/27/23 0834   BP: 112/60   Pulse: 60       PHYSICAL EXAM  Vitals and nursing note reviewed.   Constitutional:       Appearance: Healthy appearance.   Eyes:      Conjunctiva/sclera: Conjunctivae normal.      Pupils: Pupils are equal, round, and reactive to light.   Pulmonary:      Effort: Pulmonary effort is normal.      Breath sounds: Normal breath sounds.   Cardiovascular:      PMI at left midclavicular line. Normal rate. Regular rhythm.      Murmurs: There is no murmur.      No gallop.  No click. No rub.   Pulses:     Intact distal pulses.   Edema:     Peripheral edema absent.   Musculoskeletal: Normal range of motion. Skin:     General: Skin is warm and dry.   Neurological:      Mental Status: Alert and oriented to person, place and time.            ASSESSMENT AND PLAN  Diagnoses and all orders for this visit:  Inappropriate sinus tachycardia  Palpitations  PAC (premature atrial contraction)  Mixed hyperlipidemia  Former cigarette smoker  Type 2 diabetes mellitus without complication, unspecified whether long term insulin use (CMS/[facility])  BMI 20.0-20.9, adult      [unfilled]

## 2023-12-27 NOTE — PATIENT INSTRUCTIONS
Follow up as needed  Continue same medications/treatment.  Patient educated on proper medication use.  Please bring all medicines, vitamins and herbal supplements with you when you come to the office.    I, Jade Casillas LPN, am scribing for and in the presence of Dr. Manuel Onofre MD, FACC

## (undated) DEVICE — KENDALL RADIOLUCENT FOAM MONITORING ELECTRODE RECTANGULAR SHAPE: Brand: KENDALL

## (undated) DEVICE — CONNECTOR TBNG OD5-7MM O2 END DISP

## (undated) DEVICE — BLOCK BITE AD 60FR W/ VELC STRP ADDRESSES MOST PT AND

## (undated) DEVICE — CANNULA NSL ORAL AD FOR CAPNOFLEX CO2 O2 AIRLFE